# Patient Record
Sex: FEMALE | Race: WHITE | NOT HISPANIC OR LATINO | Employment: FULL TIME | URBAN - METROPOLITAN AREA
[De-identification: names, ages, dates, MRNs, and addresses within clinical notes are randomized per-mention and may not be internally consistent; named-entity substitution may affect disease eponyms.]

---

## 2017-06-22 ENCOUNTER — HISTORICAL (OUTPATIENT)
Dept: LAB | Facility: HOSPITAL | Age: 52
End: 2017-06-22

## 2017-09-28 ENCOUNTER — HISTORICAL (OUTPATIENT)
Dept: LAB | Facility: HOSPITAL | Age: 52
End: 2017-09-28

## 2017-10-12 ENCOUNTER — HISTORICAL (OUTPATIENT)
Dept: RADIOLOGY | Facility: HOSPITAL | Age: 52
End: 2017-10-12

## 2018-01-23 ENCOUNTER — HISTORICAL (OUTPATIENT)
Dept: LAB | Facility: HOSPITAL | Age: 53
End: 2018-01-23

## 2018-08-02 ENCOUNTER — HISTORICAL (OUTPATIENT)
Dept: LAB | Facility: HOSPITAL | Age: 53
End: 2018-08-02

## 2018-12-04 ENCOUNTER — HISTORICAL (OUTPATIENT)
Dept: LAB | Facility: HOSPITAL | Age: 53
End: 2018-12-04

## 2019-05-31 ENCOUNTER — HISTORICAL (OUTPATIENT)
Dept: RADIOLOGY | Facility: HOSPITAL | Age: 54
End: 2019-05-31

## 2019-06-03 ENCOUNTER — HISTORICAL (OUTPATIENT)
Dept: RADIOLOGY | Facility: HOSPITAL | Age: 54
End: 2019-06-03

## 2020-02-10 ENCOUNTER — HISTORICAL (OUTPATIENT)
Dept: LAB | Facility: HOSPITAL | Age: 55
End: 2020-02-10

## 2020-04-20 ENCOUNTER — HISTORICAL (OUTPATIENT)
Dept: RADIOLOGY | Facility: HOSPITAL | Age: 55
End: 2020-04-20

## 2020-04-27 ENCOUNTER — HISTORICAL (OUTPATIENT)
Dept: LAB | Facility: HOSPITAL | Age: 55
End: 2020-04-27

## 2020-08-11 ENCOUNTER — HISTORICAL (OUTPATIENT)
Dept: RESPIRATORY THERAPY | Facility: HOSPITAL | Age: 55
End: 2020-08-11

## 2021-01-25 ENCOUNTER — HISTORICAL (OUTPATIENT)
Dept: LAB | Facility: HOSPITAL | Age: 56
End: 2021-01-25

## 2021-06-30 ENCOUNTER — HISTORICAL (OUTPATIENT)
Dept: LAB | Facility: HOSPITAL | Age: 56
End: 2021-06-30

## 2021-07-02 ENCOUNTER — HISTORICAL (OUTPATIENT)
Dept: MEDSURG UNIT | Facility: HOSPITAL | Age: 56
End: 2021-07-02

## 2021-07-02 LAB — CRC RECOMMENDATION EXT: NORMAL

## 2021-12-02 ENCOUNTER — HISTORICAL (OUTPATIENT)
Dept: RADIOLOGY | Facility: HOSPITAL | Age: 56
End: 2021-12-02

## 2022-01-20 ENCOUNTER — HISTORICAL (OUTPATIENT)
Dept: LAB | Facility: HOSPITAL | Age: 57
End: 2022-01-20

## 2022-04-09 ENCOUNTER — HISTORICAL (OUTPATIENT)
Dept: ADMINISTRATIVE | Facility: HOSPITAL | Age: 57
End: 2022-04-09
Payer: MEDICAID

## 2022-04-26 VITALS
OXYGEN SATURATION: 98 % | DIASTOLIC BLOOD PRESSURE: 81 MMHG | HEIGHT: 64 IN | BODY MASS INDEX: 22.13 KG/M2 | SYSTOLIC BLOOD PRESSURE: 154 MMHG | WEIGHT: 129.63 LBS

## 2022-05-05 ENCOUNTER — LAB VISIT (OUTPATIENT)
Dept: LAB | Facility: HOSPITAL | Age: 57
End: 2022-05-05
Attending: FAMILY MEDICINE
Payer: MEDICAID

## 2022-05-05 DIAGNOSIS — E11.9 DIABETES MELLITUS WITHOUT COMPLICATION: Primary | ICD-10-CM

## 2022-05-05 LAB
EST. AVERAGE GLUCOSE BLD GHB EST-MCNC: 162.8 MG/DL
HBA1C MFR BLD: 7.3 %

## 2022-05-05 PROCEDURE — 36415 COLL VENOUS BLD VENIPUNCTURE: CPT

## 2022-05-05 PROCEDURE — 83036 HEMOGLOBIN GLYCOSYLATED A1C: CPT

## 2022-10-13 ENCOUNTER — OFFICE VISIT (OUTPATIENT)
Dept: FAMILY MEDICINE | Facility: CLINIC | Age: 57
End: 2022-10-13
Payer: MEDICAID

## 2022-10-13 VITALS
SYSTOLIC BLOOD PRESSURE: 132 MMHG | BODY MASS INDEX: 21.75 KG/M2 | DIASTOLIC BLOOD PRESSURE: 72 MMHG | RESPIRATION RATE: 20 BRPM | WEIGHT: 127.38 LBS | OXYGEN SATURATION: 100 % | TEMPERATURE: 98 F | HEART RATE: 68 BPM | HEIGHT: 64 IN

## 2022-10-13 DIAGNOSIS — Z76.89 ENCOUNTER TO ESTABLISH CARE: ICD-10-CM

## 2022-10-13 DIAGNOSIS — E78.5 HYPERLIPIDEMIA, UNSPECIFIED HYPERLIPIDEMIA TYPE: ICD-10-CM

## 2022-10-13 DIAGNOSIS — Z12.39 ENCOUNTER FOR SCREENING FOR MALIGNANT NEOPLASM OF BREAST, UNSPECIFIED SCREENING MODALITY: ICD-10-CM

## 2022-10-13 DIAGNOSIS — F41.9 ANXIETY AND DEPRESSION: Primary | ICD-10-CM

## 2022-10-13 DIAGNOSIS — R53.83 FATIGUE, UNSPECIFIED TYPE: ICD-10-CM

## 2022-10-13 DIAGNOSIS — L29.9 PRURITUS: ICD-10-CM

## 2022-10-13 DIAGNOSIS — F32.A ANXIETY AND DEPRESSION: Primary | ICD-10-CM

## 2022-10-13 DIAGNOSIS — E11.65 TYPE 2 DIABETES MELLITUS WITH HYPERGLYCEMIA, WITHOUT LONG-TERM CURRENT USE OF INSULIN: ICD-10-CM

## 2022-10-13 DIAGNOSIS — Z00.00 WELLNESS EXAMINATION: ICD-10-CM

## 2022-10-13 PROCEDURE — 3075F PR MOST RECENT SYSTOLIC BLOOD PRESS GE 130-139MM HG: ICD-10-PCS | Mod: CPTII,,, | Performed by: REGISTERED NURSE

## 2022-10-13 PROCEDURE — 1159F MED LIST DOCD IN RCRD: CPT | Mod: CPTII,,, | Performed by: REGISTERED NURSE

## 2022-10-13 PROCEDURE — 3078F PR MOST RECENT DIASTOLIC BLOOD PRESSURE < 80 MM HG: ICD-10-PCS | Mod: CPTII,,, | Performed by: REGISTERED NURSE

## 2022-10-13 PROCEDURE — 99204 OFFICE O/P NEW MOD 45 MIN: CPT | Mod: ,,, | Performed by: REGISTERED NURSE

## 2022-10-13 PROCEDURE — 1160F PR REVIEW ALL MEDS BY PRESCRIBER/CLIN PHARMACIST DOCUMENTED: ICD-10-PCS | Mod: CPTII,,, | Performed by: REGISTERED NURSE

## 2022-10-13 PROCEDURE — 4010F PR ACE/ARB THEARPY RXD/TAKEN: ICD-10-PCS | Mod: CPTII,,, | Performed by: REGISTERED NURSE

## 2022-10-13 PROCEDURE — 3075F SYST BP GE 130 - 139MM HG: CPT | Mod: CPTII,,, | Performed by: REGISTERED NURSE

## 2022-10-13 PROCEDURE — 1159F PR MEDICATION LIST DOCUMENTED IN MEDICAL RECORD: ICD-10-PCS | Mod: CPTII,,, | Performed by: REGISTERED NURSE

## 2022-10-13 PROCEDURE — 4010F ACE/ARB THERAPY RXD/TAKEN: CPT | Mod: CPTII,,, | Performed by: REGISTERED NURSE

## 2022-10-13 PROCEDURE — 1160F RVW MEDS BY RX/DR IN RCRD: CPT | Mod: CPTII,,, | Performed by: REGISTERED NURSE

## 2022-10-13 PROCEDURE — 99204 PR OFFICE/OUTPT VISIT, NEW, LEVL IV, 45-59 MIN: ICD-10-PCS | Mod: ,,, | Performed by: REGISTERED NURSE

## 2022-10-13 PROCEDURE — 3078F DIAST BP <80 MM HG: CPT | Mod: CPTII,,, | Performed by: REGISTERED NURSE

## 2022-10-13 RX ORDER — BUDESONIDE AND FORMOTEROL FUMARATE DIHYDRATE 160; 4.5 UG/1; UG/1
2 AEROSOL RESPIRATORY (INHALATION) 2 TIMES DAILY
COMMUNITY
Start: 2022-10-07 | End: 2023-01-23

## 2022-10-13 RX ORDER — LOSARTAN POTASSIUM 100 MG/1
100 TABLET ORAL DAILY
COMMUNITY
Start: 2022-09-26 | End: 2023-01-11

## 2022-10-13 RX ORDER — SAXAGLIPTIN 5 MG/1
TABLET, FILM COATED ORAL
COMMUNITY
End: 2023-01-04

## 2022-10-13 RX ORDER — SUCRALFATE 1 G/1
1 TABLET ORAL
COMMUNITY
End: 2024-02-27

## 2022-10-13 RX ORDER — HYDROCODONE BITARTRATE AND ACETAMINOPHEN 5; 325 MG/1; MG/1
TABLET ORAL
COMMUNITY
End: 2023-01-04

## 2022-10-13 RX ORDER — DULOXETIN HYDROCHLORIDE 60 MG/1
60 CAPSULE, DELAYED RELEASE ORAL DAILY
COMMUNITY
Start: 2022-10-10 | End: 2022-10-13

## 2022-10-13 RX ORDER — GLIMEPIRIDE 2 MG/1
2 TABLET ORAL 2 TIMES DAILY
COMMUNITY
Start: 2022-09-13 | End: 2022-10-24 | Stop reason: SDUPTHER

## 2022-10-13 RX ORDER — ASPIRIN 81 MG/1
81 TABLET ORAL DAILY
COMMUNITY
End: 2023-01-04

## 2022-10-13 RX ORDER — BUSPIRONE HYDROCHLORIDE 10 MG/1
10 TABLET ORAL 2 TIMES DAILY
COMMUNITY
Start: 2022-09-26 | End: 2022-10-13

## 2022-10-13 RX ORDER — AZELASTINE 1 MG/ML
SPRAY, METERED NASAL
COMMUNITY
Start: 2022-10-07

## 2022-10-13 RX ORDER — GABAPENTIN 600 MG/1
600 TABLET ORAL 4 TIMES DAILY
COMMUNITY
Start: 2022-10-07 | End: 2023-01-05

## 2022-10-13 RX ORDER — NIFEDIPINE 60 MG/1
60 TABLET, EXTENDED RELEASE ORAL DAILY
COMMUNITY
Start: 2022-09-26 | End: 2022-10-27

## 2022-10-13 RX ORDER — IBUPROFEN 800 MG/1
800 TABLET ORAL 3 TIMES DAILY PRN
COMMUNITY
Start: 2022-10-07 | End: 2023-01-23

## 2022-10-13 RX ORDER — OMEPRAZOLE 40 MG/1
40 CAPSULE, DELAYED RELEASE ORAL DAILY
COMMUNITY
Start: 2022-10-07 | End: 2023-02-22

## 2022-10-13 RX ORDER — HYDROXYZINE PAMOATE 25 MG/1
CAPSULE ORAL
Qty: 30 CAPSULE | Refills: 2 | Status: SHIPPED | OUTPATIENT
Start: 2022-10-13 | End: 2022-11-09

## 2022-10-13 RX ORDER — ALPRAZOLAM 0.5 MG/1
0.5 TABLET ORAL 2 TIMES DAILY
COMMUNITY
Start: 2022-10-07 | End: 2022-11-09

## 2022-10-13 RX ORDER — DULAGLUTIDE 1.5 MG/.5ML
INJECTION, SOLUTION SUBCUTANEOUS
COMMUNITY
Start: 2022-10-07 | End: 2023-02-23

## 2022-10-13 RX ORDER — ATORVASTATIN CALCIUM 10 MG/1
10 TABLET, FILM COATED ORAL DAILY
COMMUNITY
Start: 2022-09-26 | End: 2022-11-08

## 2022-10-13 RX ORDER — CARVEDILOL 25 MG/1
TABLET ORAL
COMMUNITY
End: 2023-01-04

## 2022-10-13 RX ORDER — HYDROXYZINE PAMOATE 25 MG/1
CAPSULE ORAL
COMMUNITY
End: 2022-10-13 | Stop reason: SDUPTHER

## 2022-10-13 RX ORDER — LINAGLIPTIN 5 MG/1
5 TABLET, FILM COATED ORAL DAILY
COMMUNITY
Start: 2022-09-26 | End: 2023-03-27

## 2022-10-13 RX ORDER — CITALOPRAM 20 MG/1
20 TABLET, FILM COATED ORAL DAILY
Qty: 30 TABLET | Refills: 1 | Status: SHIPPED | OUTPATIENT
Start: 2022-10-13 | End: 2022-12-19

## 2022-10-13 RX ORDER — FLUTICASONE PROPIONATE 50 MCG
1 SPRAY, SUSPENSION (ML) NASAL DAILY
COMMUNITY
Start: 2022-10-07 | End: 2023-01-05

## 2022-10-13 RX ORDER — ALBUTEROL SULFATE 90 UG/1
2 AEROSOL, METERED RESPIRATORY (INHALATION)
COMMUNITY
Start: 2022-09-06 | End: 2023-03-27

## 2022-10-13 RX ORDER — PANTOPRAZOLE SODIUM 40 MG/1
TABLET, DELAYED RELEASE ORAL
COMMUNITY
End: 2022-10-25

## 2022-10-13 RX ORDER — CLOPIDOGREL BISULFATE 75 MG/1
75 TABLET ORAL DAILY
COMMUNITY
Start: 2022-09-13 | End: 2024-02-27

## 2022-10-13 NOTE — PROGRESS NOTES
"Subjective:       Patient ID: Jocelyne Dickinson is a 57 y.o. female.    Chief Complaint: Establish Care and C/O anxiety unrelieved with medication "nerves"    Pt is a 56y/o female here today to [facility] and for wellness exam. Pt has PMH of Anxiety, Depression, HLD, GERD, COPD, DM2,  and carotid artery stenosis. Pt c/o increased fatigue, anxiety, and pruritis. Pt is currently on Xanax and Buspar, pt stated that she stopped the Buspar because it did not really help. Last Colonoscopy was in 2022 and pt stated that she was diagnosed with diverticulitis.   Review of Systems   Constitutional:  Positive for fatigue.   HENT:  Positive for postnasal drip.    Eyes: Negative.    Respiratory: Negative.     Cardiovascular: Negative.    Gastrointestinal: Negative.    Endocrine: Negative.    Genitourinary: Negative.    Musculoskeletal: Negative.    Integumentary:  Negative.   Allergic/Immunologic: Positive for environmental allergies.   Neurological: Negative.    Hematological:  Bruises/bleeds easily.        Pt on Plavix   Psychiatric/Behavioral:  Negative for suicidal ideas. The patient is nervous/anxious.        Objective:      Physical Exam  Constitutional:       Appearance: Normal appearance.   HENT:      Head: Normocephalic.      Right Ear: Tympanic membrane normal.      Left Ear: Tympanic membrane normal.      Nose: Nose normal.      Mouth/Throat:      Mouth: Mucous membranes are moist.   Eyes:      Pupils: Pupils are equal, round, and reactive to light.   Cardiovascular:      Rate and Rhythm: Normal rate and regular rhythm.      Pulses: Normal pulses.      Heart sounds: Normal heart sounds.   Pulmonary:      Effort: Pulmonary effort is normal.      Breath sounds: Normal breath sounds.   Abdominal:      General: Abdomen is flat.      Palpations: Abdomen is soft.   Musculoskeletal:         General: Normal range of motion.      Cervical back: Normal range of motion and neck supple.   Skin:     Capillary Refill: Capillary " refill takes less than 2 seconds.      Findings: Bruising present.   Neurological:      General: No focal deficit present.      Mental Status: She is alert and oriented to person, place, and time.   Psychiatric:         Mood and Affect: Mood normal.         Behavior: Behavior normal.         Thought Content: Thought content normal.         Judgment: Judgment normal.       Assessment:       Problem List Items Addressed This Visit          Psychiatric    Anxiety and depression - Primary    Relevant Medications    citalopram (CELEXA) 20 MG tablet       Derm    Pruritus    Relevant Medications    hydrOXYzine pamoate (VISTARIL) 25 MG Cap       Cardiac/Vascular    Hyperlipidemia    Relevant Orders    Lipid Panel       Endocrine    Type 2 diabetes mellitus with hyperglycemia, without long-term current use of insulin    Relevant Medications    TRULICITY 1.5 mg/0.5 mL pen injector    glimepiride (AMARYL) 2 MG tablet    TRADJENTA 5 mg Tab tablet    SAXagliptin (ONGLYZA) 5 mg Tab tablet    Other Relevant Orders    Microalbumin/Creatinine Ratio, Urine       Other    Fatigue    Relevant Orders    Iron and TIBC    Vitamin B12     Other Visit Diagnoses       Wellness examination        Relevant Orders    CBC Auto Differential    Comprehensive Metabolic Panel    Vitamin D    Hepatitis C Antibody    HIV 1/2 Ag/Ab (4th Gen)    Hemoglobin A1C    Encounter to establish care        Encounter for screening for malignant neoplasm of breast, unspecified screening modality        Relevant Orders    Mammo Digital Screening Bilat w/ Jeff        I spent a total of 50 minutes on the day of the visit.This includes face to face time and non-face to face time preparing to see the patient (eg, review of tests), obtaining and/or reviewing separately obtained history, documenting clinical information in the electronic or other health record, independently interpreting results and communicating results to the patient/family/caregiver, or care  coordinator.    Plan:   Wellness Exam: Healthy lifestyle discussed with patient, labs ordered, will call with results, tobacco cessation and harmful effects of vaping discussed with patient. Mammogram ordered today. Pt declined Cervical Cancer screening at this time.  Anxiety/Depression: Celexa 20mg sent to pharmacy on file, take as directed. Instructed patient to discontinue medication immediately if SI/HI presents and report to ED, pt verbalized understanding. Continue Xanax as previously prescribed.  Pruritus-take Vistaril 25 mg as previously prescribed, keep fingernails short  Hyperlipidemia-low-fat low-cholesterol diet discussed with patient, continue Lipitor as previously prescribed.  Lipid panel ordered, will call patient with results.  Type 2 diabetes-hemoglobin A1c ordered, will call patient with results, ADA diet discussed with patient.  Continue current medication regimen as previously prescribed.  Microalbumin creatinine ratio ordered, will call patient with results.  Fatigue-iron profile and vitamin B12 ordered, will call patient with results.  Healthy sleeping habits discussed with patient.  Return to clinic in 1 month or as needed.

## 2022-10-17 ENCOUNTER — PATIENT MESSAGE (OUTPATIENT)
Dept: FAMILY MEDICINE | Facility: CLINIC | Age: 57
End: 2022-10-17
Payer: MEDICAID

## 2022-10-17 ENCOUNTER — LAB VISIT (OUTPATIENT)
Dept: LAB | Facility: HOSPITAL | Age: 57
End: 2022-10-17
Attending: REGISTERED NURSE
Payer: MEDICAID

## 2022-10-17 DIAGNOSIS — Z00.00 WELLNESS EXAMINATION: ICD-10-CM

## 2022-10-17 DIAGNOSIS — E78.5 HYPERLIPIDEMIA, UNSPECIFIED HYPERLIPIDEMIA TYPE: ICD-10-CM

## 2022-10-17 DIAGNOSIS — R53.83 FATIGUE, UNSPECIFIED TYPE: ICD-10-CM

## 2022-10-17 LAB
ALBUMIN SERPL-MCNC: 4 GM/DL (ref 3.5–5)
ALBUMIN/GLOB SERPL: 1.3 RATIO (ref 1.1–2)
ALP SERPL-CCNC: 81 UNIT/L (ref 40–150)
ALT SERPL-CCNC: 13 UNIT/L (ref 0–55)
AST SERPL-CCNC: 16 UNIT/L (ref 5–34)
BASOPHILS # BLD AUTO: 0.03 X10(3)/MCL (ref 0–0.2)
BASOPHILS NFR BLD AUTO: 0.5 %
BILIRUBIN DIRECT+TOT PNL SERPL-MCNC: 0.4 MG/DL
BUN SERPL-MCNC: 9 MG/DL (ref 9.8–20.1)
CALCIUM SERPL-MCNC: 9.6 MG/DL (ref 8.4–10.2)
CHLORIDE SERPL-SCNC: 108 MMOL/L (ref 98–107)
CHOLEST SERPL-MCNC: 164 MG/DL
CHOLEST/HDLC SERPL: 3 {RATIO} (ref 0–5)
CO2 SERPL-SCNC: 29 MMOL/L (ref 22–29)
CREAT SERPL-MCNC: 0.82 MG/DL (ref 0.55–1.02)
CREAT UR-MCNC: 115.2 MG/DL (ref 47–110)
DEPRECATED CALCIDIOL+CALCIFEROL SERPL-MC: 29.3 NG/ML (ref 30–80)
EOSINOPHIL # BLD AUTO: 0.07 X10(3)/MCL (ref 0–0.9)
EOSINOPHIL NFR BLD AUTO: 1.2 %
ERYTHROCYTE [DISTWIDTH] IN BLOOD BY AUTOMATED COUNT: 13.5 % (ref 11.5–17)
EST. AVERAGE GLUCOSE BLD GHB EST-MCNC: 134.1 MG/DL
GFR SERPLBLD CREATININE-BSD FMLA CKD-EPI: >60 MLS/MIN/1.73/M2
GLOBULIN SER-MCNC: 3 GM/DL (ref 2.4–3.5)
GLUCOSE SERPL-MCNC: 100 MG/DL (ref 74–100)
HBA1C MFR BLD: 6.3 %
HCT VFR BLD AUTO: 37.4 % (ref 37–47)
HCV AB SERPL QL IA: NONREACTIVE
HDLC SERPL-MCNC: 51 MG/DL (ref 35–60)
HGB BLD-MCNC: 12.3 GM/DL (ref 12–16)
HIV 1+2 AB+HIV1 P24 AG SERPL QL IA: NONREACTIVE
IMM GRANULOCYTES # BLD AUTO: 0.01 X10(3)/MCL (ref 0–0.04)
IMM GRANULOCYTES NFR BLD AUTO: 0.2 %
IRON SATN MFR SERPL: 26 % (ref 20–50)
IRON SERPL-MCNC: 81 UG/DL (ref 50–170)
LDLC SERPL CALC-MCNC: 77 MG/DL (ref 50–140)
LYMPHOCYTES # BLD AUTO: 1.67 X10(3)/MCL (ref 0.6–4.6)
LYMPHOCYTES NFR BLD AUTO: 29.3 %
MCH RBC QN AUTO: 29.3 PG (ref 27–31)
MCHC RBC AUTO-ENTMCNC: 32.9 MG/DL (ref 33–36)
MCV RBC AUTO: 89 FL (ref 80–94)
MICROALBUMIN UR-MCNC: 6.1 UG/ML
MICROALBUMIN/CREAT RATIO PNL UR: 5.3 MG/GM CR (ref 0–30)
MONOCYTES # BLD AUTO: 0.51 X10(3)/MCL (ref 0.1–1.3)
MONOCYTES NFR BLD AUTO: 9 %
NEUTROPHILS # BLD AUTO: 3.4 X10(3)/MCL (ref 2.1–9.2)
NEUTROPHILS NFR BLD AUTO: 59.8 %
NRBC BLD AUTO-RTO: 0 %
PLATELET # BLD AUTO: 298 X10(3)/MCL (ref 130–400)
PMV BLD AUTO: 10.5 FL (ref 7.4–10.4)
POTASSIUM SERPL-SCNC: 4.3 MMOL/L (ref 3.5–5.1)
PROT SERPL-MCNC: 7 GM/DL (ref 6.4–8.3)
RBC # BLD AUTO: 4.2 X10(6)/MCL (ref 4.2–5.4)
SODIUM SERPL-SCNC: 145 MMOL/L (ref 136–145)
TIBC SERPL-MCNC: 225 UG/DL (ref 70–310)
TIBC SERPL-MCNC: 306 UG/DL (ref 250–450)
TRIGL SERPL-MCNC: 179 MG/DL (ref 37–140)
VIT B12 SERPL-MCNC: 434 PG/ML (ref 213–816)
VLDLC SERPL CALC-MCNC: 36 MG/DL
WBC # SPEC AUTO: 5.7 X10(3)/MCL (ref 4.5–11.5)

## 2022-10-17 PROCEDURE — 80053 COMPREHEN METABOLIC PANEL: CPT

## 2022-10-17 PROCEDURE — 36415 COLL VENOUS BLD VENIPUNCTURE: CPT

## 2022-10-17 PROCEDURE — 80061 LIPID PANEL: CPT

## 2022-10-17 PROCEDURE — 83036 HEMOGLOBIN GLYCOSYLATED A1C: CPT

## 2022-10-17 PROCEDURE — 85025 COMPLETE CBC W/AUTO DIFF WBC: CPT

## 2022-10-17 PROCEDURE — 83540 ASSAY OF IRON: CPT

## 2022-10-17 PROCEDURE — 87389 HIV-1 AG W/HIV-1&-2 AB AG IA: CPT

## 2022-10-17 PROCEDURE — 82607 VITAMIN B-12: CPT

## 2022-10-17 PROCEDURE — 86803 HEPATITIS C AB TEST: CPT

## 2022-10-17 PROCEDURE — 82306 VITAMIN D 25 HYDROXY: CPT

## 2022-10-19 ENCOUNTER — HOSPITAL ENCOUNTER (OUTPATIENT)
Dept: RADIOLOGY | Facility: HOSPITAL | Age: 57
Discharge: HOME | End: 2022-10-19
Attending: REGISTERED NURSE
Payer: MEDICAID

## 2022-10-19 DIAGNOSIS — Z12.39 ENCOUNTER FOR SCREENING FOR MALIGNANT NEOPLASM OF BREAST, UNSPECIFIED SCREENING MODALITY: ICD-10-CM

## 2022-10-19 PROCEDURE — 77063 BREAST TOMOSYNTHESIS BI: CPT | Mod: 26,,, | Performed by: RADIOLOGY

## 2022-10-19 PROCEDURE — 77067 SCR MAMMO BI INCL CAD: CPT | Mod: TC

## 2022-10-19 PROCEDURE — 77067 SCR MAMMO BI INCL CAD: CPT | Mod: 26,,, | Performed by: RADIOLOGY

## 2022-10-19 PROCEDURE — 77063 MAMMO DIGITAL SCREENING BILAT WITH TOMO: ICD-10-PCS | Mod: 26,,, | Performed by: RADIOLOGY

## 2022-10-19 PROCEDURE — 77067 MAMMO DIGITAL SCREENING BILAT WITH TOMO: ICD-10-PCS | Mod: 26,,, | Performed by: RADIOLOGY

## 2022-10-24 DIAGNOSIS — E11.65 TYPE 2 DIABETES MELLITUS WITH HYPERGLYCEMIA, WITHOUT LONG-TERM CURRENT USE OF INSULIN: ICD-10-CM

## 2022-10-24 DIAGNOSIS — E11.65 TYPE 2 DIABETES MELLITUS WITH HYPERGLYCEMIA, WITHOUT LONG-TERM CURRENT USE OF INSULIN: Primary | ICD-10-CM

## 2022-10-24 RX ORDER — GLIMEPIRIDE 2 MG/1
2 TABLET ORAL 2 TIMES DAILY
Qty: 30 TABLET | Refills: 3 | Status: SHIPPED | OUTPATIENT
Start: 2022-10-24 | End: 2022-10-24 | Stop reason: SDUPTHER

## 2022-10-24 RX ORDER — GLIMEPIRIDE 2 MG/1
2 TABLET ORAL 2 TIMES DAILY
Qty: 60 TABLET | Refills: 3 | Status: SHIPPED | OUTPATIENT
Start: 2022-10-24 | End: 2022-10-25

## 2022-10-24 NOTE — TELEPHONE ENCOUNTER
----- Message from LEANN Rodriguez sent at 10/24/2022  8:30 AM CDT -----  Regarding: Results  Please inform patient that her mammogram was normal, we will re screen in one year. Thanks.

## 2022-10-25 RX ORDER — GLIMEPIRIDE 2 MG/1
2 TABLET ORAL 2 TIMES DAILY
Qty: 180 TABLET | Refills: 3 | Status: SHIPPED | OUTPATIENT
Start: 2022-10-25 | End: 2023-09-05

## 2022-11-08 ENCOUNTER — OFFICE VISIT (OUTPATIENT)
Dept: FAMILY MEDICINE | Facility: CLINIC | Age: 57
End: 2022-11-08
Payer: MEDICAID

## 2022-11-08 VITALS
HEIGHT: 64 IN | TEMPERATURE: 97 F | OXYGEN SATURATION: 99 % | HEART RATE: 88 BPM | BODY MASS INDEX: 22.5 KG/M2 | SYSTOLIC BLOOD PRESSURE: 136 MMHG | DIASTOLIC BLOOD PRESSURE: 88 MMHG | WEIGHT: 131.81 LBS | RESPIRATION RATE: 20 BRPM

## 2022-11-08 DIAGNOSIS — F32.A ANXIETY AND DEPRESSION: Primary | ICD-10-CM

## 2022-11-08 DIAGNOSIS — H65.04 RECURRENT ACUTE SEROUS OTITIS MEDIA OF RIGHT EAR: ICD-10-CM

## 2022-11-08 DIAGNOSIS — F41.9 ANXIETY AND DEPRESSION: Primary | ICD-10-CM

## 2022-11-08 DIAGNOSIS — E78.5 HYPERLIPIDEMIA, UNSPECIFIED HYPERLIPIDEMIA TYPE: ICD-10-CM

## 2022-11-08 PROCEDURE — 3075F SYST BP GE 130 - 139MM HG: CPT | Mod: CPTII,,, | Performed by: REGISTERED NURSE

## 2022-11-08 PROCEDURE — 3079F PR MOST RECENT DIASTOLIC BLOOD PRESSURE 80-89 MM HG: ICD-10-PCS | Mod: CPTII,,, | Performed by: REGISTERED NURSE

## 2022-11-08 PROCEDURE — 1159F MED LIST DOCD IN RCRD: CPT | Mod: CPTII,,, | Performed by: REGISTERED NURSE

## 2022-11-08 PROCEDURE — 4010F ACE/ARB THERAPY RXD/TAKEN: CPT | Mod: CPTII,,, | Performed by: REGISTERED NURSE

## 2022-11-08 PROCEDURE — 3008F BODY MASS INDEX DOCD: CPT | Mod: CPTII,,, | Performed by: REGISTERED NURSE

## 2022-11-08 PROCEDURE — 3075F PR MOST RECENT SYSTOLIC BLOOD PRESS GE 130-139MM HG: ICD-10-PCS | Mod: CPTII,,, | Performed by: REGISTERED NURSE

## 2022-11-08 PROCEDURE — 99213 PR OFFICE/OUTPT VISIT, EST, LEVL III, 20-29 MIN: ICD-10-PCS | Mod: ,,, | Performed by: REGISTERED NURSE

## 2022-11-08 PROCEDURE — 1159F PR MEDICATION LIST DOCUMENTED IN MEDICAL RECORD: ICD-10-PCS | Mod: CPTII,,, | Performed by: REGISTERED NURSE

## 2022-11-08 PROCEDURE — 4010F PR ACE/ARB THEARPY RXD/TAKEN: ICD-10-PCS | Mod: CPTII,,, | Performed by: REGISTERED NURSE

## 2022-11-08 PROCEDURE — 99213 OFFICE O/P EST LOW 20 MIN: CPT | Mod: ,,, | Performed by: REGISTERED NURSE

## 2022-11-08 PROCEDURE — 3079F DIAST BP 80-89 MM HG: CPT | Mod: CPTII,,, | Performed by: REGISTERED NURSE

## 2022-11-08 PROCEDURE — 3008F PR BODY MASS INDEX (BMI) DOCUMENTED: ICD-10-PCS | Mod: CPTII,,, | Performed by: REGISTERED NURSE

## 2022-11-08 RX ORDER — ATORVASTATIN CALCIUM 20 MG/1
20 TABLET, FILM COATED ORAL DAILY
Qty: 90 TABLET | Refills: 3 | Status: SHIPPED | OUTPATIENT
Start: 2022-11-08 | End: 2023-09-05

## 2022-11-08 RX ORDER — AMOXICILLIN 500 MG/1
500 TABLET, FILM COATED ORAL EVERY 12 HOURS
Qty: 14 TABLET | Refills: 0 | Status: SHIPPED | OUTPATIENT
Start: 2022-11-08 | End: 2022-11-15

## 2022-11-10 ENCOUNTER — CLINICAL SUPPORT (OUTPATIENT)
Dept: FAMILY MEDICINE | Facility: CLINIC | Age: 57
End: 2022-11-10
Payer: MEDICAID

## 2022-11-10 DIAGNOSIS — J30.89 PERENNIAL ALLERGIC RHINITIS: Primary | ICD-10-CM

## 2022-11-10 PROCEDURE — 95115 PR IMMUNOTHERAPY, ONE INJECTION: ICD-10-PCS | Mod: ,,, | Performed by: FAMILY MEDICINE

## 2022-11-10 PROCEDURE — 95115 IMMUNOTHERAPY ONE INJECTION: CPT | Mod: ,,, | Performed by: FAMILY MEDICINE

## 2022-11-10 NOTE — PROGRESS NOTES
Pt presents today in clinic for a nurse visit to receive allergy shots. Admin 0.15mL of vial #2 into back of LUE. Pt tolerated injections well and left office in good condition after being monitored for 20 minutes in office, no reaction noted. Pt scheduled in one week for next round of allergy injections.   Pt brought own allergy mix from allergist.  GA BUITRAGO LPN

## 2022-11-22 ENCOUNTER — CLINICAL SUPPORT (OUTPATIENT)
Dept: FAMILY MEDICINE | Facility: CLINIC | Age: 57
End: 2022-11-22
Payer: MEDICAID

## 2022-11-22 DIAGNOSIS — J30.89 PERENNIAL ALLERGIC RHINITIS: Primary | ICD-10-CM

## 2022-11-22 DIAGNOSIS — J06.9 UPPER RESPIRATORY TRACT INFECTION, UNSPECIFIED TYPE: Primary | ICD-10-CM

## 2022-11-22 PROCEDURE — 95115 IMMUNOTHERAPY ONE INJECTION: CPT | Mod: ,,, | Performed by: FAMILY MEDICINE

## 2022-11-22 PROCEDURE — 95115 PR IMMUNOTHERAPY, ONE INJECTION: ICD-10-PCS | Mod: ,,, | Performed by: FAMILY MEDICINE

## 2022-11-22 RX ORDER — AZITHROMYCIN 1 G/1
1 POWDER, FOR SUSPENSION ORAL ONCE
Qty: 1 PACKET | Refills: 0 | Status: CANCELLED | OUTPATIENT
Start: 2022-11-22 | End: 2022-11-22

## 2022-11-22 RX ORDER — AZITHROMYCIN 250 MG/1
TABLET, FILM COATED ORAL
Qty: 6 TABLET | Refills: 0 | Status: SHIPPED | OUTPATIENT
Start: 2022-11-22 | End: 2022-11-27

## 2022-11-22 NOTE — PROCEDURES
Pt presents today in clinic for a nurse visit to receive allergy shots. Admin 0.20mL of Vial A into back of RUE. Pt tolerated injections well and left office in good condition after being monitored for 20 minutes in office, no reaction noted. Pt scheduled in one week for next round of allergy injections.   Pt brought own allergy mix from allergist.  CIARA WALLS

## 2022-12-01 ENCOUNTER — CLINICAL SUPPORT (OUTPATIENT)
Dept: FAMILY MEDICINE | Facility: CLINIC | Age: 57
End: 2022-12-01
Payer: MEDICAID

## 2022-12-01 DIAGNOSIS — J06.9 UPPER RESPIRATORY TRACT INFECTION, UNSPECIFIED TYPE: ICD-10-CM

## 2022-12-01 DIAGNOSIS — J30.89 PERENNIAL ALLERGIC RHINITIS: Primary | ICD-10-CM

## 2022-12-01 PROCEDURE — 95115 PR IMMUNOTHERAPY, ONE INJECTION: ICD-10-PCS | Mod: ,,, | Performed by: FAMILY MEDICINE

## 2022-12-01 PROCEDURE — 95115 IMMUNOTHERAPY ONE INJECTION: CPT | Mod: ,,, | Performed by: FAMILY MEDICINE

## 2022-12-01 NOTE — PROGRESS NOTES
Pt presents today in clinic for a nurse visit to receive allergy shots. Admin 0.25mL of Vial A into back of LUE  Pt tolerated injections well and left office in good condition after being monitored for 20 minutes in office, no reaction noted. Pt scheduled in one week for next round of allergy injections.   Pt brought own allergy mix from allergist.  Jose Antonio URBINA

## 2022-12-08 ENCOUNTER — CLINICAL SUPPORT (OUTPATIENT)
Dept: FAMILY MEDICINE | Facility: CLINIC | Age: 57
End: 2022-12-08
Payer: MEDICAID

## 2022-12-08 DIAGNOSIS — J30.89 PERENNIAL ALLERGIC RHINITIS: Primary | ICD-10-CM

## 2022-12-08 PROCEDURE — 95115 IMMUNOTHERAPY ONE INJECTION: CPT | Mod: ,,, | Performed by: FAMILY MEDICINE

## 2022-12-08 PROCEDURE — 95115 PR IMMUNOTHERAPY, ONE INJECTION: ICD-10-PCS | Mod: ,,, | Performed by: FAMILY MEDICINE

## 2022-12-08 NOTE — PROGRESS NOTES
Pt presents today in clinic for a nurse visit to receive allergy shots. Admin 0.30mL of Vial A into back of RUE. Pt tolerated injections well and left office in good condition after being monitored for 20 minutes in office, no reaction noted. Pt scheduled in one week for next round of allergy injections.   Pt brought own allergy mix from allergist.  CIARA WALLS

## 2022-12-15 ENCOUNTER — CLINICAL SUPPORT (OUTPATIENT)
Dept: FAMILY MEDICINE | Facility: CLINIC | Age: 57
End: 2022-12-15
Payer: MEDICAID

## 2022-12-15 DIAGNOSIS — J30.89 PERENNIAL ALLERGIC RHINITIS: Primary | ICD-10-CM

## 2022-12-15 PROCEDURE — 95115 IMMUNOTHERAPY ONE INJECTION: CPT | Mod: ,,, | Performed by: FAMILY MEDICINE

## 2022-12-15 PROCEDURE — 95115 PR IMMUNOTHERAPY, ONE INJECTION: ICD-10-PCS | Mod: ,,, | Performed by: FAMILY MEDICINE

## 2022-12-15 NOTE — PROGRESS NOTES
Pt presents today in clinic for a nurse visit to receive allergy shots. Admin 0.35mL of Vial A into back of LUE. Pt tolerated injections well and left office in good condition after being monitored for 20 minutes in office, no reaction noted. Pt scheduled in one week for next round of allergy injections.   Pt brought own allergy mix from allergist.  CIARA WALLS

## 2022-12-22 ENCOUNTER — CLINICAL SUPPORT (OUTPATIENT)
Dept: FAMILY MEDICINE | Facility: CLINIC | Age: 57
End: 2022-12-22
Payer: MEDICAID

## 2022-12-22 DIAGNOSIS — J30.89 PERENNIAL ALLERGIC RHINITIS: Primary | ICD-10-CM

## 2022-12-22 PROCEDURE — 95115 IMMUNOTHERAPY ONE INJECTION: CPT | Mod: ,,, | Performed by: FAMILY MEDICINE

## 2022-12-22 PROCEDURE — 95115 PR IMMUNOTHERAPY, ONE INJECTION: ICD-10-PCS | Mod: ,,, | Performed by: FAMILY MEDICINE

## 2022-12-22 NOTE — PROGRESS NOTES
Pt presents today in clinic for a nurse visit to receive allergy shots. Admin 0.40mL of Red Vial into back of RUE. Pt tolerated injections well and left office in good condition after being monitored for 20 minutes in office, no reaction noted. Pt scheduled in one week for next round of allergy injections.   Pt brought own allergy mix from allergist.  CIARA WALLS

## 2022-12-29 ENCOUNTER — CLINICAL SUPPORT (OUTPATIENT)
Dept: FAMILY MEDICINE | Facility: CLINIC | Age: 57
End: 2022-12-29
Payer: MEDICAID

## 2022-12-29 DIAGNOSIS — J30.89 PERENNIAL ALLERGIC RHINITIS: Primary | ICD-10-CM

## 2022-12-29 PROCEDURE — 95115 PR IMMUNOTHERAPY, ONE INJECTION: ICD-10-PCS | Mod: ,,, | Performed by: FAMILY MEDICINE

## 2022-12-29 PROCEDURE — 95115 IMMUNOTHERAPY ONE INJECTION: CPT | Mod: ,,, | Performed by: FAMILY MEDICINE

## 2022-12-29 NOTE — PROGRESS NOTES
Pt presents today in clinic for a nurse visit to receive allergy shots. Admin 0.45mL of Vial A into back of LUE. Pt tolerated injections well and left office in good condition after being monitored for 20 minutes in office, no reaction noted. Pt scheduled in one week for next round of allergy injections.   Pt brought own allergy mix from allergist.  CIARA WALLS

## 2023-01-04 ENCOUNTER — OFFICE VISIT (OUTPATIENT)
Dept: FAMILY MEDICINE | Facility: CLINIC | Age: 58
End: 2023-01-04
Payer: MEDICAID

## 2023-01-04 VITALS — HEIGHT: 64 IN | RESPIRATION RATE: 20 BRPM | BODY MASS INDEX: 22.36 KG/M2 | WEIGHT: 131 LBS | TEMPERATURE: 98 F

## 2023-01-04 DIAGNOSIS — R09.81 SINUS CONGESTION: Primary | ICD-10-CM

## 2023-01-04 PROBLEM — I10 ESSENTIAL HYPERTENSION: Status: ACTIVE | Noted: 2018-12-07

## 2023-01-04 PROBLEM — G62.9 NEUROPATHY: Status: ACTIVE | Noted: 2018-12-07

## 2023-01-04 PROBLEM — I65.22 LEFT CAROTID ARTERY STENOSIS: Status: ACTIVE | Noted: 2021-04-20

## 2023-01-04 PROBLEM — F41.9 ANXIETY: Status: ACTIVE | Noted: 2018-12-07

## 2023-01-04 PROCEDURE — 1159F PR MEDICATION LIST DOCUMENTED IN MEDICAL RECORD: ICD-10-PCS | Mod: CPTII,95,, | Performed by: FAMILY MEDICINE

## 2023-01-04 PROCEDURE — 1159F MED LIST DOCD IN RCRD: CPT | Mod: CPTII,95,, | Performed by: FAMILY MEDICINE

## 2023-01-04 PROCEDURE — 99213 PR OFFICE/OUTPT VISIT, EST, LEVL III, 20-29 MIN: ICD-10-PCS | Mod: 95,,, | Performed by: FAMILY MEDICINE

## 2023-01-04 PROCEDURE — 99213 OFFICE O/P EST LOW 20 MIN: CPT | Mod: 95,,, | Performed by: FAMILY MEDICINE

## 2023-01-04 PROCEDURE — 3008F BODY MASS INDEX DOCD: CPT | Mod: CPTII,95,, | Performed by: FAMILY MEDICINE

## 2023-01-04 PROCEDURE — 3008F PR BODY MASS INDEX (BMI) DOCUMENTED: ICD-10-PCS | Mod: CPTII,95,, | Performed by: FAMILY MEDICINE

## 2023-01-04 PROCEDURE — 1160F RVW MEDS BY RX/DR IN RCRD: CPT | Mod: CPTII,95,, | Performed by: FAMILY MEDICINE

## 2023-01-04 PROCEDURE — 1160F PR REVIEW ALL MEDS BY PRESCRIBER/CLIN PHARMACIST DOCUMENTED: ICD-10-PCS | Mod: CPTII,95,, | Performed by: FAMILY MEDICINE

## 2023-01-04 NOTE — PROGRESS NOTES
Patient ID: [id number]     Chief Complaint: Cough, Nasal Congestion, and Sore Throat (Burning when swallowing)      HPI:     This is a telemedicine note. Patient was treated using telemedicine, real time audio and video, according to [facility] protocols. IEdi DO, conducted the visit from the [facility]. The patient participated in the visit at a non-[facility] location selected by the patient, identified below. I am licensed in the state where the patient stated they are located. The patient stated that they understood and accepted the privacy and security risks to their information at their location. This visit is not recorded.    Patient was located at the patient's home.       Jocelyne Dickinson is a 58 y.o. female here today for a telemedicine visit. Cough, congestion, sore throat started Monday morning 23.         ----------------------------  Anxiety disorder, unspecified  Carotid artery stenosis  Cellulitis of scalp  COPD (chronic obstructive pulmonary disease)  Diabetes mellitus  HTN (hypertension)  Mixed hyperlipidemia  Neuropathy  Osteoarthritis  Pneumonia, unspecified organism  Sleep apnea, unspecified  Syncope     Past Surgical History:   Procedure Laterality Date    CATARACT EXTRACTION      REPAIR OF CAROTID ARTERY      TONSILLECTOMY         Review of patient's allergies indicates:  No Known Allergies    Outpatient Medications Marked as Taking for the 23 encounter (Office Visit) with Edi Roldan DO   Medication Sig Dispense Refill    albuterol (PROVENTIL/VENTOLIN HFA) 90 mcg/actuation inhaler Inhale 2 puffs into the lungs every 4 to 6 hours as needed for Shortness of Breath.      ALPRAZolam (XANAX) 0.5 MG tablet TAKE ONE TABLET BY MOUTH TWICE DAILY 60 tablet 4    atorvastatin (LIPITOR) 20 MG tablet Take 1 tablet (20 mg total) by mouth once daily. 90 tablet 3    azelastine (ASTELIN) 137 mcg (0.1 %) nasal spray SMARTSI-2 Spray(s) Both Nares Twice Daily      citalopram  (CELEXA) 20 MG tablet TAKE ONE TABLET BY MOUTH DAILY 30 tablet 1    clopidogreL (PLAVIX) 75 mg tablet Take 75 mg by mouth once daily.      fluticasone propionate (FLONASE) 50 mcg/actuation nasal spray 1 spray by Each Nostril route once daily.      gabapentin (NEURONTIN) 600 MG tablet Take 600 mg by mouth 4 (four) times daily.      glimepiride (AMARYL) 2 MG tablet Take 1 tablet (2 mg total) by mouth 2 (two) times a day. 180 tablet 3    hydrOXYzine pamoate (VISTARIL) 25 MG Cap TAKE ONE CAPSULE BY MOUTH DAILY 30 capsule 1     mg tablet Take 800 mg by mouth 3 (three) times daily as needed.      losartan (COZAAR) 100 MG tablet Take 100 mg by mouth once daily.      NIFEdipine (PROCARDIA-XL) 60 MG (OSM) 24 hr tablet TAKE 1 TABLET BY MOUTH DAILY FOR BLOOD PRESSURE 30 tablet 4    omeprazole (PRILOSEC) 40 MG capsule Take 40 mg by mouth once daily.      sucralfate (CARAFATE) 1 gram tablet sucralfate 1 gram tablet      SYMBICORT 160-4.5 mcg/actuation HFAA Inhale 2 puffs into the lungs 2 (two) times daily.      TRADJENTA 5 mg Tab tablet Take 5 mg by mouth once daily.      TRULICITY 1.5 mg/0.5 mL pen injector Inject into the skin.      [DISCONTINUED] SAXagliptin (ONGLYZA) 5 mg Tab tablet Onglyza 5 mg tablet         Social History     Socioeconomic History    Marital status: Single   Tobacco Use    Smoking status: Every Day     Types: Vaping with nicotine    Smokeless tobacco: Never   Substance and Sexual Activity    Alcohol use: Not Currently    Drug use: Never        Family History   Problem Relation Age of Onset    Hypertension Father     Heart disease Father     Cancer Father     Cancer Maternal Grandmother     Vision loss Maternal Grandmother         Patient Care Team:  Chapincito Koenig MD as PCP - General (Family Medicine)      Subjective:       Review of Systems   Constitutional:  Negative for fever.   HENT:  Positive for congestion and sore throat. Negative for ear discharge and ear pain.    Respiratory:  Positive  "for cough and stridor. Negative for shortness of breath.    Gastrointestinal:  Negative for abdominal pain, diarrhea and vomiting.   Musculoskeletal:  Negative for neck pain.   Neurological:  Negative for headaches.     See HPI for details    All Other ROS: Negative except as stated in HPI.       Objective:     Temp 97.8 °F (36.6 °C)   Resp 20   Ht 5' 4" (1.626 m)   Wt 59.4 kg (131 lb)   BMI 22.49 kg/m²     Physical Exam    Physical Exam: LIMITED DUE TO TELEMEDICINE RESTRICTIONS.  General: Alert and oriented, No acute distress.  Head: Normocephalic, Atraumatic.  Eye: Sclera non-icteric.  Neck/Thyroid:  Full range of motion.  Respiratory: Non-labored respirations, Symmetrical chest wall expansion.  Musculoskeletal: Normal range of motion.  Integumentary:  No visible suspicious lesions or rashes. No diaphoresis.   Neurologic: No focal deficits  Psychiatric: Normal interaction, Coherent speech, Euthymic mood, Appropriate affect       Assessment:       ICD-10-CM ICD-9-CM   1. Sinus congestion  R09.81 [id number]        Plan:     1. Sinus congestion  -Recommended OTC mucinex. At this time will defer from prescribing a steroid due to patient's diagnosis of diabetes and the risk of increasing her blood sugar levels. If symptoms not improved in the next week will consider prescribing short course of steroids. Patient voiced understanding of plan.       Medication List with Changes/Refills   Current Medications    ALBUTEROL (PROVENTIL/VENTOLIN HFA) 90 MCG/ACTUATION INHALER    Inhale 2 puffs into the lungs every 4 to 6 hours as needed for Shortness of Breath.       Start Date: 2022  End Date: --    ALPRAZOLAM (XANAX) 0.5 MG TABLET    TAKE ONE TABLET BY MOUTH TWICE DAILY       Start Date: 2022 End Date: --    ATORVASTATIN (LIPITOR) 20 MG TABLET    Take 1 tablet (20 mg total) by mouth once daily.       Start Date: 2022 End Date: 2023    AZELASTINE (ASTELIN) 137 MCG (0.1 %) NASAL SPRAY    SMARTSI-2 Spray(s) " Both Nares Twice Daily       Start Date: 10/7/2022 End Date: --    CITALOPRAM (CELEXA) 20 MG TABLET    TAKE ONE TABLET BY MOUTH DAILY       Start Date: 12/19/2022End Date: --    CLOPIDOGREL (PLAVIX) 75 MG TABLET    Take 75 mg by mouth once daily.       Start Date: 9/13/2022 End Date: --    FLUTICASONE PROPIONATE (FLONASE) 50 MCG/ACTUATION NASAL SPRAY    1 spray by Each Nostril route once daily.       Start Date: 10/7/2022 End Date: --    GABAPENTIN (NEURONTIN) 600 MG TABLET    Take 600 mg by mouth 4 (four) times daily.       Start Date: 10/7/2022 End Date: --    GLIMEPIRIDE (AMARYL) 2 MG TABLET    Take 1 tablet (2 mg total) by mouth 2 (two) times a day.       Start Date: 10/25/2022End Date: 10/25/2023    HYDROXYZINE PAMOATE (VISTARIL) 25 MG CAP    TAKE ONE CAPSULE BY MOUTH DAILY       Start Date: 11/9/2022 End Date: --     MG TABLET    Take 800 mg by mouth 3 (three) times daily as needed.       Start Date: 10/7/2022 End Date: --    LOSARTAN (COZAAR) 100 MG TABLET    Take 100 mg by mouth once daily.       Start Date: 9/26/2022 End Date: --    NIFEDIPINE (PROCARDIA-XL) 60 MG (OSM) 24 HR TABLET    TAKE 1 TABLET BY MOUTH DAILY FOR BLOOD PRESSURE       Start Date: 10/27/2022End Date: --    OMEPRAZOLE (PRILOSEC) 40 MG CAPSULE    Take 40 mg by mouth once daily.       Start Date: 10/7/2022 End Date: --    SUCRALFATE (CARAFATE) 1 GRAM TABLET    sucralfate 1 gram tablet       Start Date: --        End Date: --    SYMBICORT 160-4.5 MCG/ACTUATION HFAA    Inhale 2 puffs into the lungs 2 (two) times daily.       Start Date: 10/7/2022 End Date: --    TRADJENTA 5 MG TAB TABLET    Take 5 mg by mouth once daily.       Start Date: 9/26/2022 End Date: --    TRULICITY 1.5 MG/0.5 ML PEN INJECTOR    Inject into the skin.       Start Date: 10/7/2022 End Date: --   Discontinued Medications    ASPIRIN (ECOTRIN) 81 MG EC TABLET    Take 81 mg by mouth Daily.       Start Date: --        End Date: 1/4/2023    CARVEDILOL (COREG) 25 MG  TABLET    carvedilol 25 mg tablet       Start Date: --        End Date: 1/4/2023    HYDROCODONE-ACETAMINOPHEN (NORCO) 5-325 MG PER TABLET    hydrocodone 5 mg-acetaminophen 325 mg tablet   TAKE 1 TABLET BY MOUTH EVERY 4 HOURS AS NEEDED FOR PAIN       Start Date: --        End Date: 1/4/2023    SAXAGLIPTIN (ONGLYZA) 5 MG TAB TABLET    Onglyza 5 mg tablet       Start Date: --        End Date: 1/4/2023          No follow-ups on file. In addition to their scheduled follow up, the patient has also been instructed to follow up on as needed basis.       Video Time Documentation:  Spent 10 minutes with patient face to face discussed health concerns. More than 50% of this time was spent in counseling and coordination of care.  Answers submitted by the patient for this visit:  Sore Throat Questionnaire (Submitted on 1/4/2023)  Chief Complaint: Sore throat  Chronicity: new  Onset: in the past 7 days  Progression since onset: unchanged  Pain worse on: neither  Fever: no fever  Fever duration: less than 1 day  Pain - numeric: 4/10  drooling: No  hoarse voice: No  plugged ear sensation: Yes  swollen glands: No  trouble swallowing: No  strep: No  mono: No  Treatments tried: NSAIDs  Improvement on treatment: mild  Pain severity: mild

## 2023-01-05 ENCOUNTER — CLINICAL SUPPORT (OUTPATIENT)
Dept: FAMILY MEDICINE | Facility: CLINIC | Age: 58
End: 2023-01-05
Payer: MEDICAID

## 2023-01-05 DIAGNOSIS — J30.89 PERENNIAL ALLERGIC RHINITIS: Primary | ICD-10-CM

## 2023-01-05 PROCEDURE — 95115 PR IMMUNOTHERAPY, ONE INJECTION: ICD-10-PCS | Mod: ,,, | Performed by: FAMILY MEDICINE

## 2023-01-05 PROCEDURE — 95115 IMMUNOTHERAPY ONE INJECTION: CPT | Mod: ,,, | Performed by: FAMILY MEDICINE

## 2023-01-05 NOTE — PROGRESS NOTES
Pt presents today in clinic for a nurse visit to receive allergy shots. Admin 0.10mL of Vial A into back of RUE. Pt tolerated injections well and left office in good condition after being monitored for 20 minutes in office, no reaction noted. Pt scheduled in one week for next round of allergy injections.   Pt brought own allergy mix from allergist.  CIARA WALLS

## 2023-01-23 ENCOUNTER — PATIENT MESSAGE (OUTPATIENT)
Dept: ADMINISTRATIVE | Facility: HOSPITAL | Age: 58
End: 2023-01-23
Payer: MEDICAID

## 2023-01-26 ENCOUNTER — CLINICAL SUPPORT (OUTPATIENT)
Dept: FAMILY MEDICINE | Facility: CLINIC | Age: 58
End: 2023-01-26
Payer: MEDICAID

## 2023-01-26 DIAGNOSIS — J30.89 PERENNIAL ALLERGIC RHINITIS: Primary | ICD-10-CM

## 2023-01-26 PROCEDURE — 95115 IMMUNOTHERAPY ONE INJECTION: CPT | Mod: ,,, | Performed by: FAMILY MEDICINE

## 2023-01-26 PROCEDURE — 95115 PR IMMUNOTHERAPY, ONE INJECTION: ICD-10-PCS | Mod: ,,, | Performed by: FAMILY MEDICINE

## 2023-01-26 NOTE — PROGRESS NOTES
Pt presents today in clinic for a nurse visit to receive allergy shots. Admin 0.20mL of Vial A into back of LUE. Pt tolerated injections well and left office in good condition after being monitored for 20 minutes in office, no reaction noted. Pt scheduled in one week for next round of allergy injections.   Pt brought own allergy mix from allergist.  CIARA WALLS

## 2023-02-02 ENCOUNTER — CLINICAL SUPPORT (OUTPATIENT)
Dept: FAMILY MEDICINE | Facility: CLINIC | Age: 58
End: 2023-02-02
Payer: MEDICAID

## 2023-02-02 DIAGNOSIS — J30.89 PERENNIAL ALLERGIC RHINITIS: Primary | ICD-10-CM

## 2023-02-02 PROCEDURE — 95115 IMMUNOTHERAPY ONE INJECTION: CPT | Mod: ,,, | Performed by: FAMILY MEDICINE

## 2023-02-02 PROCEDURE — 95115 PR IMMUNOTHERAPY, ONE INJECTION: ICD-10-PCS | Mod: ,,, | Performed by: FAMILY MEDICINE

## 2023-02-09 ENCOUNTER — CLINICAL SUPPORT (OUTPATIENT)
Dept: FAMILY MEDICINE | Facility: CLINIC | Age: 58
End: 2023-02-09
Payer: MEDICAID

## 2023-02-09 DIAGNOSIS — J30.89 PERENNIAL ALLERGIC RHINITIS: Primary | ICD-10-CM

## 2023-02-09 PROCEDURE — 95115 IMMUNOTHERAPY ONE INJECTION: CPT | Mod: ,,, | Performed by: FAMILY MEDICINE

## 2023-02-09 PROCEDURE — 95115 PR IMMUNOTHERAPY, ONE INJECTION: ICD-10-PCS | Mod: ,,, | Performed by: FAMILY MEDICINE

## 2023-02-14 ENCOUNTER — TELEPHONE (OUTPATIENT)
Dept: FAMILY MEDICINE | Facility: CLINIC | Age: 58
End: 2023-02-14
Payer: MEDICAID

## 2023-02-14 NOTE — TELEPHONE ENCOUNTER
----- Message from Venita Forrest sent at 2/10/2023  2:39 PM CST -----  Regarding: Medcical  Jocelyne would like for [facility] to call her back regarding rash from her spot.[phone]    Type:  Needs Medical Advice    Who Called: Jocelyne  Would the patient rather a call back or a response via [facility] [employer]? Call back  Best Call Back Number: [phone]  Additional Information: Jocelyne would like a call her back from [facility].  She states Ni advised her to call after her shot.  She states now she has a red spot and its a lil bigger than before but not that bad.

## 2023-02-14 NOTE — TELEPHONE ENCOUNTER
Spoke with patient had a 50 cent pieces size reaction to her last allergy injection. Told her we will call allergist prior to next shot to ask what steps to take. She verbalized understanding.

## 2023-02-16 ENCOUNTER — CLINICAL SUPPORT (OUTPATIENT)
Dept: FAMILY MEDICINE | Facility: CLINIC | Age: 58
End: 2023-02-16
Payer: MEDICAID

## 2023-02-16 DIAGNOSIS — J32.9 SINUSITIS, UNSPECIFIED CHRONICITY, UNSPECIFIED LOCATION: Primary | ICD-10-CM

## 2023-02-16 PROCEDURE — 99211 OFF/OP EST MAY X REQ PHY/QHP: CPT | Mod: ,,, | Performed by: REGISTERED NURSE

## 2023-02-16 PROCEDURE — 99211 PR OFFICE/OUTPT VISIT, EST, LEVL I: ICD-10-PCS | Mod: ,,, | Performed by: REGISTERED NURSE

## 2023-02-16 RX ORDER — METHYLPREDNISOLONE ACETATE 80 MG/ML
40 INJECTION, SUSPENSION INTRA-ARTICULAR; INTRALESIONAL; INTRAMUSCULAR; SOFT TISSUE
Status: DISCONTINUED | OUTPATIENT
Start: 2023-02-16 | End: 2023-02-16

## 2023-02-16 RX ORDER — METHYLPREDNISOLONE ACETATE 40 MG/ML
40 INJECTION, SUSPENSION INTRA-ARTICULAR; INTRALESIONAL; INTRAMUSCULAR; SOFT TISSUE
Status: COMPLETED | OUTPATIENT
Start: 2023-02-16 | End: 2023-02-16

## 2023-02-16 RX ADMIN — METHYLPREDNISOLONE ACETATE 40 MG: 40 INJECTION, SUSPENSION INTRA-ARTICULAR; INTRALESIONAL; INTRAMUSCULAR; SOFT TISSUE at 02:02

## 2023-02-16 NOTE — PROGRESS NOTES
Patient came in with Wheezing and sob. Per Estela Negron, NP administered methylprednisolone 40 mg/ml. Injection was given into right upper glut. Patient tolerated injection well no se noted  idania Bautista  Exp: 07/2024  Lot [id number]

## 2023-02-22 DIAGNOSIS — G89.29 OTHER CHRONIC PAIN: ICD-10-CM

## 2023-02-22 DIAGNOSIS — K21.9 GASTRO-ESOPHAGEAL REFLUX DISEASE WITHOUT ESOPHAGITIS: ICD-10-CM

## 2023-02-22 RX ORDER — IBUPROFEN 800 MG/1
TABLET ORAL
Qty: 90 TABLET | Refills: 1 | Status: SHIPPED | OUTPATIENT
Start: 2023-02-22 | End: 2023-04-26

## 2023-02-22 RX ORDER — OMEPRAZOLE 40 MG/1
CAPSULE, DELAYED RELEASE ORAL
Qty: 30 CAPSULE | Refills: 5 | Status: SHIPPED | OUTPATIENT
Start: 2023-02-22 | End: 2023-09-05

## 2023-02-23 ENCOUNTER — CLINICAL SUPPORT (OUTPATIENT)
Dept: FAMILY MEDICINE | Facility: CLINIC | Age: 58
End: 2023-02-23
Payer: MEDICAID

## 2023-02-23 DIAGNOSIS — J30.89 PERENNIAL ALLERGIC RHINITIS: Primary | ICD-10-CM

## 2023-02-23 PROCEDURE — 95115 PR IMMUNOTHERAPY, ONE INJECTION: ICD-10-PCS | Mod: ,,, | Performed by: FAMILY MEDICINE

## 2023-02-23 PROCEDURE — 95115 IMMUNOTHERAPY ONE INJECTION: CPT | Mod: ,,, | Performed by: FAMILY MEDICINE

## 2023-02-23 NOTE — PROGRESS NOTES
Pt presents today in clinic for a nurse visit to receive allergy shots. Admin 0.35mL of Vial A into back of RUE. Pt tolerated injections well and left office in good condition after being monitored for 20 minutes in office, no reaction noted. Pt scheduled in one week for next round of allergy injections.   Pt brought own allergy mix from allergist.  CIARA WALLS

## 2023-03-02 ENCOUNTER — CLINICAL SUPPORT (OUTPATIENT)
Dept: FAMILY MEDICINE | Facility: CLINIC | Age: 58
End: 2023-03-02
Payer: MEDICAID

## 2023-03-02 DIAGNOSIS — J30.89 PERENNIAL ALLERGIC RHINITIS: Primary | ICD-10-CM

## 2023-03-02 PROCEDURE — 95115 PR IMMUNOTHERAPY, ONE INJECTION: ICD-10-PCS | Mod: ,,, | Performed by: FAMILY MEDICINE

## 2023-03-02 PROCEDURE — 95115 IMMUNOTHERAPY ONE INJECTION: CPT | Mod: ,,, | Performed by: FAMILY MEDICINE

## 2023-03-02 NOTE — PROGRESS NOTES
Pt presents today in clinic for a nurse visit to receive allergy shots. Admin 0.10mL of Vial A into back of LUE. Pt tolerated injections well and left office in good condition after being monitored for 20 minutes in office, no reaction noted. Pt scheduled in one week for next round of allergy injections.   Pt brought own allergy mix from allergist.  CIARA WALLS

## 2023-03-08 ENCOUNTER — DOCUMENTATION ONLY (OUTPATIENT)
Dept: FAMILY MEDICINE | Facility: CLINIC | Age: 58
End: 2023-03-08
Payer: MEDICAID

## 2023-03-08 LAB
LEFT EYE DM RETINOPATHY: NEGATIVE
RIGHT EYE DM RETINOPATHY: NEGATIVE

## 2023-03-09 ENCOUNTER — CLINICAL SUPPORT (OUTPATIENT)
Dept: FAMILY MEDICINE | Facility: CLINIC | Age: 58
End: 2023-03-09
Payer: MEDICAID

## 2023-03-09 DIAGNOSIS — J30.89 PERENNIAL ALLERGIC RHINITIS: Primary | ICD-10-CM

## 2023-03-09 PROCEDURE — 95115 PR IMMUNOTHERAPY, ONE INJECTION: ICD-10-PCS | Mod: ,,, | Performed by: FAMILY MEDICINE

## 2023-03-09 PROCEDURE — 95115 IMMUNOTHERAPY ONE INJECTION: CPT | Mod: ,,, | Performed by: FAMILY MEDICINE

## 2023-03-16 ENCOUNTER — CLINICAL SUPPORT (OUTPATIENT)
Dept: FAMILY MEDICINE | Facility: CLINIC | Age: 58
End: 2023-03-16
Payer: MEDICAID

## 2023-03-16 DIAGNOSIS — J30.89 PERENNIAL ALLERGIC RHINITIS: Primary | ICD-10-CM

## 2023-03-16 PROCEDURE — 95115 IMMUNOTHERAPY ONE INJECTION: CPT | Mod: ,,, | Performed by: FAMILY MEDICINE

## 2023-03-16 PROCEDURE — 95115 PR IMMUNOTHERAPY, ONE INJECTION: ICD-10-PCS | Mod: ,,, | Performed by: FAMILY MEDICINE

## 2023-03-16 NOTE — PROGRESS NOTES
Pt presents today in clinic for a nurse visit to receive allergy shots. Admin 0.30mL of Vial A into back of LUE. Pt tolerated injections well and left office in good condition after being monitored for 20 minutes in office, no reaction noted. Pt scheduled in one week for next round of allergy injections.   Pt brought own allergy mix from allergist.  CIARA WALLS

## 2023-03-23 ENCOUNTER — CLINICAL SUPPORT (OUTPATIENT)
Dept: FAMILY MEDICINE | Facility: CLINIC | Age: 58
End: 2023-03-23
Payer: MEDICAID

## 2023-03-23 DIAGNOSIS — J30.89 PERENNIAL ALLERGIC RHINITIS: Primary | ICD-10-CM

## 2023-03-23 PROCEDURE — 95115 PR IMMUNOTHERAPY, ONE INJECTION: ICD-10-PCS | Mod: ,,, | Performed by: FAMILY MEDICINE

## 2023-03-23 PROCEDURE — 95115 IMMUNOTHERAPY ONE INJECTION: CPT | Mod: ,,, | Performed by: FAMILY MEDICINE

## 2023-03-27 DIAGNOSIS — E11.9 TYPE 2 DIABETES MELLITUS WITHOUT COMPLICATIONS: ICD-10-CM

## 2023-03-27 DIAGNOSIS — J44.0 CHRONIC OBSTRUCTIVE PULMONARY DISEASE WITH (ACUTE) LOWER RESPIRATORY INFECTION: ICD-10-CM

## 2023-03-27 RX ORDER — LINAGLIPTIN 5 MG/1
TABLET, FILM COATED ORAL
Qty: 30 TABLET | Refills: 4 | Status: SHIPPED | OUTPATIENT
Start: 2023-03-27 | End: 2023-07-31

## 2023-03-27 RX ORDER — ALBUTEROL SULFATE 90 UG/1
AEROSOL, METERED RESPIRATORY (INHALATION)
Qty: 6.7 G | Refills: 4 | Status: SHIPPED | OUTPATIENT
Start: 2023-03-27 | End: 2023-11-02

## 2023-03-30 ENCOUNTER — CLINICAL SUPPORT (OUTPATIENT)
Dept: FAMILY MEDICINE | Facility: CLINIC | Age: 58
End: 2023-03-30
Payer: MEDICAID

## 2023-03-30 DIAGNOSIS — J30.89 PERENNIAL ALLERGIC RHINITIS: Primary | ICD-10-CM

## 2023-03-30 PROCEDURE — 95115 IMMUNOTHERAPY ONE INJECTION: CPT | Mod: ,,, | Performed by: FAMILY MEDICINE

## 2023-03-30 PROCEDURE — 95115 PR IMMUNOTHERAPY, ONE INJECTION: ICD-10-PCS | Mod: ,,, | Performed by: FAMILY MEDICINE

## 2023-04-06 ENCOUNTER — CLINICAL SUPPORT (OUTPATIENT)
Dept: FAMILY MEDICINE | Facility: CLINIC | Age: 58
End: 2023-04-06
Payer: MEDICAID

## 2023-04-06 DIAGNOSIS — J30.89 PERENNIAL ALLERGIC RHINITIS: Primary | ICD-10-CM

## 2023-04-06 PROCEDURE — 95115 PR IMMUNOTHERAPY, ONE INJECTION: ICD-10-PCS | Mod: ,,, | Performed by: FAMILY MEDICINE

## 2023-04-06 PROCEDURE — 95115 IMMUNOTHERAPY ONE INJECTION: CPT | Mod: ,,, | Performed by: FAMILY MEDICINE

## 2023-04-13 ENCOUNTER — CLINICAL SUPPORT (OUTPATIENT)
Dept: FAMILY MEDICINE | Facility: CLINIC | Age: 58
End: 2023-04-13
Payer: MEDICAID

## 2023-04-13 DIAGNOSIS — J30.89 PERENNIAL ALLERGIC RHINITIS: Primary | ICD-10-CM

## 2023-04-13 PROCEDURE — 95115 IMMUNOTHERAPY ONE INJECTION: CPT | Mod: ,,, | Performed by: FAMILY MEDICINE

## 2023-04-13 PROCEDURE — 95115 PR IMMUNOTHERAPY, ONE INJECTION: ICD-10-PCS | Mod: ,,, | Performed by: FAMILY MEDICINE

## 2023-04-17 ENCOUNTER — PATIENT MESSAGE (OUTPATIENT)
Dept: ADMINISTRATIVE | Facility: HOSPITAL | Age: 58
End: 2023-04-17
Payer: MEDICAID

## 2023-04-20 ENCOUNTER — CLINICAL SUPPORT (OUTPATIENT)
Dept: FAMILY MEDICINE | Facility: CLINIC | Age: 58
End: 2023-04-20
Payer: MEDICAID

## 2023-04-20 DIAGNOSIS — J30.89 PERENNIAL ALLERGIC RHINITIS: Primary | ICD-10-CM

## 2023-04-20 PROCEDURE — 95115 IMMUNOTHERAPY ONE INJECTION: CPT | Mod: ,,, | Performed by: FAMILY MEDICINE

## 2023-04-20 PROCEDURE — 95115 PR IMMUNOTHERAPY, ONE INJECTION: ICD-10-PCS | Mod: ,,, | Performed by: FAMILY MEDICINE

## 2023-04-26 DIAGNOSIS — G89.29 OTHER CHRONIC PAIN: ICD-10-CM

## 2023-04-26 RX ORDER — IBUPROFEN 800 MG/1
TABLET ORAL
Qty: 90 TABLET | Refills: 1 | Status: SHIPPED | OUTPATIENT
Start: 2023-04-26 | End: 2023-06-26

## 2023-04-27 ENCOUNTER — CLINICAL SUPPORT (OUTPATIENT)
Dept: FAMILY MEDICINE | Facility: CLINIC | Age: 58
End: 2023-04-27
Payer: MEDICAID

## 2023-04-27 DIAGNOSIS — J30.89 PERENNIAL ALLERGIC RHINITIS: Primary | ICD-10-CM

## 2023-05-01 ENCOUNTER — PATIENT MESSAGE (OUTPATIENT)
Dept: ADMINISTRATIVE | Facility: HOSPITAL | Age: 58
End: 2023-05-01
Payer: MEDICAID

## 2023-05-02 ENCOUNTER — PATIENT OUTREACH (OUTPATIENT)
Dept: ADMINISTRATIVE | Facility: HOSPITAL | Age: 58
End: 2023-05-02
Payer: MEDICAID

## 2023-05-02 NOTE — PROGRESS NOTES
Population Health. Out Reach.  The following record(s)  below were uploaded for Health Maintenance .    COLONOSCOPY     7/2/2021

## 2023-05-04 ENCOUNTER — CLINICAL SUPPORT (OUTPATIENT)
Dept: FAMILY MEDICINE | Facility: CLINIC | Age: 58
End: 2023-05-04
Payer: MEDICAID

## 2023-05-04 DIAGNOSIS — J30.89 PERENNIAL ALLERGIC RHINITIS: Primary | ICD-10-CM

## 2023-05-04 PROCEDURE — 95115 PR IMMUNOTHERAPY, ONE INJECTION: ICD-10-PCS | Mod: ,,, | Performed by: FAMILY MEDICINE

## 2023-05-04 PROCEDURE — 95115 IMMUNOTHERAPY ONE INJECTION: CPT | Mod: ,,, | Performed by: FAMILY MEDICINE

## 2023-05-04 NOTE — PROGRESS NOTES
Pt presents today in clinic for a nurse visit to receive allergy shots. Admin 0.35mL of Vial A into back of RUE and. Pt tolerated injections well and left office in good condition after being monitored for 20 minutes in office, no reaction noted. Pt scheduled in one week for next round of allergy injections.   Pt brought own allergy mix from allergist.  CIARA WALLS

## 2023-05-11 ENCOUNTER — CLINICAL SUPPORT (OUTPATIENT)
Dept: FAMILY MEDICINE | Facility: CLINIC | Age: 58
End: 2023-05-11
Payer: MEDICAID

## 2023-05-11 DIAGNOSIS — J30.89 PERENNIAL ALLERGIC RHINITIS: Primary | ICD-10-CM

## 2023-05-11 PROCEDURE — 95115 PR IMMUNOTHERAPY, ONE INJECTION: ICD-10-PCS | Mod: ,,, | Performed by: FAMILY MEDICINE

## 2023-05-11 PROCEDURE — 95115 IMMUNOTHERAPY ONE INJECTION: CPT | Mod: ,,, | Performed by: FAMILY MEDICINE

## 2023-05-18 ENCOUNTER — CLINICAL SUPPORT (OUTPATIENT)
Dept: FAMILY MEDICINE | Facility: CLINIC | Age: 58
End: 2023-05-18
Payer: MEDICAID

## 2023-05-18 DIAGNOSIS — F41.9 ANXIETY AND DEPRESSION: ICD-10-CM

## 2023-05-18 DIAGNOSIS — J30.89 PERENNIAL ALLERGIC RHINITIS: Primary | ICD-10-CM

## 2023-05-18 DIAGNOSIS — F32.A ANXIETY AND DEPRESSION: ICD-10-CM

## 2023-05-18 DIAGNOSIS — I10 ESSENTIAL HYPERTENSION: Primary | ICD-10-CM

## 2023-05-18 DIAGNOSIS — L29.9 PRURITUS: ICD-10-CM

## 2023-05-18 PROCEDURE — 95115 IMMUNOTHERAPY ONE INJECTION: CPT | Mod: ,,, | Performed by: FAMILY MEDICINE

## 2023-05-18 PROCEDURE — 95115 PR IMMUNOTHERAPY, ONE INJECTION: ICD-10-PCS | Mod: ,,, | Performed by: FAMILY MEDICINE

## 2023-05-18 RX ORDER — NIFEDIPINE 60 MG/1
TABLET, EXTENDED RELEASE ORAL
Qty: 30 TABLET | Refills: 5 | Status: SHIPPED | OUTPATIENT
Start: 2023-05-18 | End: 2023-11-29

## 2023-05-18 RX ORDER — CITALOPRAM 20 MG/1
TABLET, FILM COATED ORAL
Qty: 30 TABLET | Refills: 5 | Status: SHIPPED | OUTPATIENT
Start: 2023-05-18 | End: 2023-11-29

## 2023-05-18 RX ORDER — HYDROXYZINE PAMOATE 25 MG/1
CAPSULE ORAL
Qty: 30 CAPSULE | Refills: 1 | Status: SHIPPED | OUTPATIENT
Start: 2023-05-18 | End: 2023-06-26

## 2023-05-23 ENCOUNTER — OFFICE VISIT (OUTPATIENT)
Dept: FAMILY MEDICINE | Facility: CLINIC | Age: 58
End: 2023-05-23
Payer: MEDICAID

## 2023-05-23 VITALS
OXYGEN SATURATION: 99 % | TEMPERATURE: 98 F | DIASTOLIC BLOOD PRESSURE: 71 MMHG | HEIGHT: 64 IN | BODY MASS INDEX: 22.99 KG/M2 | HEART RATE: 66 BPM | SYSTOLIC BLOOD PRESSURE: 162 MMHG | WEIGHT: 134.63 LBS | RESPIRATION RATE: 18 BRPM

## 2023-05-23 DIAGNOSIS — Z12.4 CERVICAL CANCER SCREENING: ICD-10-CM

## 2023-05-23 DIAGNOSIS — G62.9 POLYNEUROPATHY, UNSPECIFIED: ICD-10-CM

## 2023-05-23 DIAGNOSIS — R53.83 FATIGUE, UNSPECIFIED TYPE: ICD-10-CM

## 2023-05-23 DIAGNOSIS — J30.89 PERENNIAL ALLERGIC RHINITIS: ICD-10-CM

## 2023-05-23 DIAGNOSIS — J43.9 PULMONARY EMPHYSEMA, UNSPECIFIED EMPHYSEMA TYPE: ICD-10-CM

## 2023-05-23 DIAGNOSIS — E11.65 TYPE 2 DIABETES MELLITUS WITH HYPERGLYCEMIA, WITHOUT LONG-TERM CURRENT USE OF INSULIN: Primary | ICD-10-CM

## 2023-05-23 PROCEDURE — 1159F PR MEDICATION LIST DOCUMENTED IN MEDICAL RECORD: ICD-10-PCS | Mod: CPTII,,, | Performed by: FAMILY MEDICINE

## 2023-05-23 PROCEDURE — 99214 OFFICE O/P EST MOD 30 MIN: CPT | Mod: 25,,, | Performed by: FAMILY MEDICINE

## 2023-05-23 PROCEDURE — 3077F SYST BP >= 140 MM HG: CPT | Mod: CPTII,,, | Performed by: FAMILY MEDICINE

## 2023-05-23 PROCEDURE — 4010F PR ACE/ARB THEARPY RXD/TAKEN: ICD-10-PCS | Mod: CPTII,,, | Performed by: FAMILY MEDICINE

## 2023-05-23 PROCEDURE — 1160F RVW MEDS BY RX/DR IN RCRD: CPT | Mod: CPTII,,, | Performed by: FAMILY MEDICINE

## 2023-05-23 PROCEDURE — 3008F BODY MASS INDEX DOCD: CPT | Mod: CPTII,,, | Performed by: FAMILY MEDICINE

## 2023-05-23 PROCEDURE — 95115 IMMUNOTHERAPY ONE INJECTION: CPT | Mod: ,,, | Performed by: FAMILY MEDICINE

## 2023-05-23 PROCEDURE — 3078F DIAST BP <80 MM HG: CPT | Mod: CPTII,,, | Performed by: FAMILY MEDICINE

## 2023-05-23 PROCEDURE — 4010F ACE/ARB THERAPY RXD/TAKEN: CPT | Mod: CPTII,,, | Performed by: FAMILY MEDICINE

## 2023-05-23 PROCEDURE — 3078F PR MOST RECENT DIASTOLIC BLOOD PRESSURE < 80 MM HG: ICD-10-PCS | Mod: CPTII,,, | Performed by: FAMILY MEDICINE

## 2023-05-23 PROCEDURE — 1160F PR REVIEW ALL MEDS BY PRESCRIBER/CLIN PHARMACIST DOCUMENTED: ICD-10-PCS | Mod: CPTII,,, | Performed by: FAMILY MEDICINE

## 2023-05-23 PROCEDURE — 3077F PR MOST RECENT SYSTOLIC BLOOD PRESSURE >= 140 MM HG: ICD-10-PCS | Mod: CPTII,,, | Performed by: FAMILY MEDICINE

## 2023-05-23 PROCEDURE — 95115 PR IMMUNOTHERAPY, ONE INJECTION: ICD-10-PCS | Mod: ,,, | Performed by: FAMILY MEDICINE

## 2023-05-23 PROCEDURE — 99214 PR OFFICE/OUTPT VISIT, EST, LEVL IV, 30-39 MIN: ICD-10-PCS | Mod: 25,,, | Performed by: FAMILY MEDICINE

## 2023-05-23 PROCEDURE — 1159F MED LIST DOCD IN RCRD: CPT | Mod: CPTII,,, | Performed by: FAMILY MEDICINE

## 2023-05-23 PROCEDURE — 3008F PR BODY MASS INDEX (BMI) DOCUMENTED: ICD-10-PCS | Mod: CPTII,,, | Performed by: FAMILY MEDICINE

## 2023-05-23 RX ORDER — GABAPENTIN 600 MG/1
TABLET ORAL
Qty: 120 TABLET | Refills: 4 | Status: SHIPPED | OUTPATIENT
Start: 2023-05-23 | End: 2023-08-24 | Stop reason: DRUGHIGH

## 2023-05-23 NOTE — PROGRESS NOTES
Patient ID: [id number]     Chief Complaint: Follow-up and Immunotherapy        HPI:     Jocelyne Dickinson is a 58 y.o. female here today for Follow-up and Immunotherapy. Chronic fatigue, works nights.         ----------------------------  Anxiety disorder, unspecified  Carotid artery stenosis  Cellulitis of scalp  COPD (chronic obstructive pulmonary disease)  Diabetes mellitus  HTN (hypertension)  Mixed hyperlipidemia  Neuropathy  Osteoarthritis  Pneumonia, unspecified organism  Sleep apnea, unspecified  Syncope     Past Surgical History:   Procedure Laterality Date    CATARACT EXTRACTION      COLONOSCOPY  2021    Dr. Nila Sharma    REPAIR OF CAROTID ARTERY      TONSILLECTOMY         Review of patient's allergies indicates:   Allergen Reactions    Aller ext-american cockroach Itching and Other (See Comments)    Allerg ext-tree poll-red maple Itching and Other (See Comments)    Cat hair standardized allergenic extract Itching and Other (See Comments)    Dog hair standardized allergenic extract Itching and Other (See Comments)    Grass pollen-ruslan, standard Itching and Other (See Comments)    Horse dander Itching and Other (See Comments)    Tree pollen-box elder Itching and Other (See Comments)    Tree pollen-pecan Itching and Other (See Comments)       Outpatient Medications Marked as Taking for the 23 encounter (Office Visit) with Edi Roldan, DO   Medication Sig Dispense Refill    albuterol (PROVENTIL/VENTOLIN HFA) 90 mcg/actuation inhaler inhale TWO puffs EVERY 4 TO 6 HOURS AS NEEDED FOR wheezing 6.7 g 4    ALPRAZolam (XANAX) 0.5 MG tablet TAKE ONE TABLET BY MOUTH TWICE DAILY 60 tablet 4    atorvastatin (LIPITOR) 20 MG tablet Take 1 tablet (20 mg total) by mouth once daily. 90 tablet 3    azelastine (ASTELIN) 137 mcg (0.1 %) nasal spray SMARTSI-2 Spray(s) Both Nares Twice Daily      citalopram (CELEXA) 20 MG tablet TAKE ONE TABLET BY MOUTH DAILY 30 tablet 5    clopidogreL (PLAVIX) 75 mg  tablet Take 75 mg by mouth once daily.      dulaglutide (TRULICITY) 1.5 mg/0.5 mL pen injector INJECT 0.5 ML EVERY WEEK BY SUBCUTANEOUS ROUTE. 4 pen 6    fluticasone propionate (FLONASE) 50 mcg/actuation nasal spray USE 1 SPRAY IN EACH NOSTRIL DAILY 16 g 4    gabapentin (NEURONTIN) 600 MG tablet TAKE 1 TABLET BY MOUTH FOUR TIMES DAILY 120 tablet 4    glimepiride (AMARYL) 2 MG tablet Take 1 tablet (2 mg total) by mouth 2 (two) times a day. 180 tablet 3    hydrOXYzine pamoate (VISTARIL) 25 MG Cap TAKE ONE CAPSULE BY MOUTH DAILY 30 capsule 1    ibuprofen (ADVIL,MOTRIN) 800 MG tablet TAKE ONE TABLET BY MOUTH THREE TIMES DAILY WITH FOOD AS NEEDED FOR PAIN 90 tablet 1    losartan (COZAAR) 100 MG tablet TAKE 1 TABLET BY MOUTH DAILY FOR BLOOD PRESSURE 30 tablet 5    NIFEdipine (PROCARDIA-XL) 60 MG (OSM) 24 hr tablet TAKE ONE TABLET BY MOUTH DAILY FOR BLOOD PRESSURE 30 tablet 5    omeprazole (PRILOSEC) 40 MG capsule TAKE ONE CAPSULE BY MOUTH DAILY for stomach 30 capsule 5    sucralfate (CARAFATE) 1 gram tablet sucralfate 1 gram tablet      SYMBICORT 160-4.5 mcg/actuation HFAA INHALE 2 PUFFS TWICE A DAY 10.2 g 6    TRADJENTA 5 mg Tab tablet TAKE 1 TABLET BY MOUTH DAILY 30 tablet 4       Social History     Socioeconomic History    Marital status: Single   Tobacco Use    Smoking status: Every Day     Types: Vaping with nicotine    Smokeless tobacco: Never   Substance and Sexual Activity    Alcohol use: Not Currently    Drug use: Never     Social Determinants of Health     Financial Resource Strain: Low Risk     Difficulty of Paying Living Expenses: Not hard at all   Food Insecurity: No Food Insecurity    Worried About Running Out of Food in the Last Year: Never true    Ran Out of Food in the Last Year: Never true   Transportation Needs: No Transportation Needs    Lack of Transportation (Medical): No    Lack of Transportation (Non-Medical): No   Physical Activity: Sufficiently Active    Days of Exercise per Week: 7 days     "Minutes of Exercise per Session: 30 min   Stress: Stress Concern Present    Feeling of Stress : To some extent   Social Connections: Socially Integrated    Frequency of Communication with Friends and Family: More than three times a week    Frequency of Social Gatherings with Friends and Family: More than three times a week    Attends Scientologist Services: More than 4 times per year    Active Member of Clubs or Organizations: Yes    Attends Club or Organization Meetings: More than 4 times per year    Marital Status:    Housing Stability: Low Risk     Unable to Pay for Housing in the Last Year: No    Number of Places Lived in the Last Year: 1    Unstable Housing in the Last Year: No        Family History   Problem Relation Age of Onset    Hypertension Father     Heart disease Father     Cancer Father     Cancer Maternal Grandmother     Vision loss Maternal Grandmother         Patient Care Team:  Edi Roldan DO as PCP - General (Family Medicine)  Drew Gardiner MD as Consulting Physician (Otolaryngology)     Subjective:     Review of Systems   Constitutional:  Positive for malaise/fatigue. Negative for chills and fever.   Respiratory:  Positive for cough, shortness of breath and wheezing. Negative for sputum production.    Cardiovascular:  Negative for chest pain.   Gastrointestinal:  Negative for constipation and diarrhea.   Neurological:  Negative for dizziness and headaches.   Psychiatric/Behavioral:  The patient has insomnia.      See HPI for details  All Other ROS: Negative except as stated in HPI.       Objective:     BP (!) 162/71   Pulse 66   Temp 97.7 °F (36.5 °C) (Tympanic)   Resp 18   Ht 5' 3.78" (1.62 m)   Wt 61.1 kg (134 lb 9.6 oz)   SpO2 99%   BMI 23.26 kg/m²     Physical Exam  Vitals reviewed.   Constitutional:       General: She is not in acute distress.     Appearance: Normal appearance.   Cardiovascular:      Rate and Rhythm: Normal rate and regular rhythm.      Heart sounds: No " murmur heard.    No friction rub. No gallop.   Pulmonary:      Effort: No respiratory distress.      Breath sounds: No wheezing, rhonchi or rales.   Musculoskeletal:         General: No swelling, tenderness or deformity.      Right lower leg: No edema.      Left lower leg: No edema.   Skin:     General: Skin is warm and dry.      Findings: No lesion or rash.   Neurological:      General: No focal deficit present.      Mental Status: She is alert.   Psychiatric:         Mood and Affect: Mood normal.       Assessment/Plan:     1. Type 2 diabetes mellitus with hyperglycemia, without long-term current use of insulin  -     Lipid Panel; Future; Expected date: 05/23/2023  -     Comprehensive Metabolic Panel; Future; Expected date: 05/23/2023  -     Hemoglobin A1C; Future; Expected date: 05/23/2023  -     Microalbumin/Creatinine Ratio, Urine; Future; Expected date: 05/23/2023    2. Perennial allergic rhinitis    3. Pulmonary emphysema, unspecified emphysema type  -     Ambulatory referral/consult to Pulmonology; Future; Expected date: 05/23/2023    4. Fatigue, unspecified type  -     TSH; Future; Expected date: 05/23/2023  -     T4, Free; Future; Expected date: 05/23/2023    5. Cervical cancer screening  -     Ambulatory referral/consult to Obstetrics / Gynecology; Future; Expected date: 05/23/2023          Follow up:     Follow up in about 3 months (around 8/23/2023) for Follow up. In addition to their scheduled follow up, the patient has also been instructed to follow up on as needed basis.

## 2023-05-23 NOTE — PROGRESS NOTES
Pt presents today in clinic to receive allergy shots. Admin 0.30mL of Vial A into back of LUE. Pt tolerated injection well and left office in good condition after being monitored for 20 minutes in office, no reaction noted. Pt scheduled in one week for next round of allergy injections.   Pt brought own allergy mix from allergist.  CIARA Mayes

## 2023-05-24 ENCOUNTER — LAB VISIT (OUTPATIENT)
Dept: LAB | Facility: HOSPITAL | Age: 58
End: 2023-05-24
Attending: FAMILY MEDICINE
Payer: MEDICAID

## 2023-05-24 DIAGNOSIS — R53.83 FATIGUE, UNSPECIFIED TYPE: ICD-10-CM

## 2023-05-24 DIAGNOSIS — E11.65 TYPE 2 DIABETES MELLITUS WITH HYPERGLYCEMIA, WITHOUT LONG-TERM CURRENT USE OF INSULIN: ICD-10-CM

## 2023-05-24 LAB
ALBUMIN SERPL-MCNC: 4 G/DL (ref 3.5–5)
ALBUMIN/GLOB SERPL: 1.5 RATIO (ref 1.1–2)
ALP SERPL-CCNC: 81 UNIT/L (ref 40–150)
ALT SERPL-CCNC: 10 UNIT/L (ref 0–55)
AST SERPL-CCNC: 15 UNIT/L (ref 5–34)
BILIRUBIN DIRECT+TOT PNL SERPL-MCNC: 0.4 MG/DL
BUN SERPL-MCNC: 15 MG/DL (ref 9.8–20.1)
CALCIUM SERPL-MCNC: 10.4 MG/DL (ref 8.4–10.2)
CHLORIDE SERPL-SCNC: 105 MMOL/L (ref 98–107)
CHOLEST SERPL-MCNC: 181 MG/DL
CHOLEST/HDLC SERPL: 4 {RATIO} (ref 0–5)
CO2 SERPL-SCNC: 31 MMOL/L (ref 22–29)
CREAT SERPL-MCNC: 0.95 MG/DL (ref 0.55–1.02)
CREAT UR-MCNC: 134.2 MG/DL (ref 47–110)
EST. AVERAGE GLUCOSE BLD GHB EST-MCNC: 159.9 MG/DL
GFR SERPLBLD CREATININE-BSD FMLA CKD-EPI: >60 MLS/MIN/1.73/M2
GLOBULIN SER-MCNC: 2.7 GM/DL (ref 2.4–3.5)
GLUCOSE SERPL-MCNC: 165 MG/DL (ref 74–100)
HBA1C MFR BLD: 7.2 %
HDLC SERPL-MCNC: 49 MG/DL (ref 35–60)
LDLC SERPL CALC-MCNC: 92 MG/DL (ref 50–140)
MICROALBUMIN UR-MCNC: 9.7 UG/ML
MICROALBUMIN/CREAT RATIO PNL UR: 7.2 MG/GM CR (ref 0–30)
POTASSIUM SERPL-SCNC: 3.7 MMOL/L (ref 3.5–5.1)
PROT SERPL-MCNC: 6.7 GM/DL (ref 6.4–8.3)
SODIUM SERPL-SCNC: 144 MMOL/L (ref 136–145)
T4 FREE SERPL-MCNC: 0.82 NG/DL (ref 0.7–1.48)
TRIGL SERPL-MCNC: 200 MG/DL (ref 37–140)
TSH SERPL-ACNC: 3.13 UIU/ML (ref 0.35–4.94)
VLDLC SERPL CALC-MCNC: 40 MG/DL

## 2023-05-24 PROCEDURE — 83036 HEMOGLOBIN GLYCOSYLATED A1C: CPT

## 2023-05-24 PROCEDURE — 36415 COLL VENOUS BLD VENIPUNCTURE: CPT

## 2023-05-24 PROCEDURE — 84439 ASSAY OF FREE THYROXINE: CPT

## 2023-05-24 PROCEDURE — 82043 UR ALBUMIN QUANTITATIVE: CPT

## 2023-05-24 PROCEDURE — 80053 COMPREHEN METABOLIC PANEL: CPT

## 2023-05-24 PROCEDURE — 80061 LIPID PANEL: CPT

## 2023-05-24 PROCEDURE — 84443 ASSAY THYROID STIM HORMONE: CPT

## 2023-05-30 RX ORDER — ALPRAZOLAM 0.5 MG/1
TABLET ORAL
Qty: 60 TABLET | Refills: 4 | Status: SHIPPED | OUTPATIENT
Start: 2023-05-30 | End: 2023-10-02 | Stop reason: SDUPTHER

## 2023-06-01 ENCOUNTER — CLINICAL SUPPORT (OUTPATIENT)
Dept: FAMILY MEDICINE | Facility: CLINIC | Age: 58
End: 2023-06-01
Payer: MEDICAID

## 2023-06-01 DIAGNOSIS — J30.89 PERENNIAL ALLERGIC RHINITIS: Primary | ICD-10-CM

## 2023-06-01 PROCEDURE — 95115 PR IMMUNOTHERAPY, ONE INJECTION: ICD-10-PCS | Mod: ,,, | Performed by: FAMILY MEDICINE

## 2023-06-01 PROCEDURE — 95115 IMMUNOTHERAPY ONE INJECTION: CPT | Mod: ,,, | Performed by: FAMILY MEDICINE

## 2023-06-08 ENCOUNTER — CLINICAL SUPPORT (OUTPATIENT)
Dept: FAMILY MEDICINE | Facility: CLINIC | Age: 58
End: 2023-06-08
Payer: MEDICAID

## 2023-06-08 DIAGNOSIS — J30.89 PERENNIAL ALLERGIC RHINITIS: Primary | ICD-10-CM

## 2023-06-08 PROCEDURE — 95115 IMMUNOTHERAPY ONE INJECTION: CPT | Mod: ,,, | Performed by: FAMILY MEDICINE

## 2023-06-08 PROCEDURE — 95115 PR IMMUNOTHERAPY, ONE INJECTION: ICD-10-PCS | Mod: ,,, | Performed by: FAMILY MEDICINE

## 2023-06-15 ENCOUNTER — CLINICAL SUPPORT (OUTPATIENT)
Dept: FAMILY MEDICINE | Facility: CLINIC | Age: 58
End: 2023-06-15
Payer: MEDICAID

## 2023-06-15 DIAGNOSIS — J30.89 PERENNIAL ALLERGIC RHINITIS: Primary | ICD-10-CM

## 2023-06-15 PROCEDURE — 95115 PR IMMUNOTHERAPY, ONE INJECTION: ICD-10-PCS | Mod: ,,, | Performed by: FAMILY MEDICINE

## 2023-06-15 PROCEDURE — 95115 IMMUNOTHERAPY ONE INJECTION: CPT | Mod: ,,, | Performed by: FAMILY MEDICINE

## 2023-06-15 NOTE — PROGRESS NOTES
Pt presents today in clinic for a nurse visit to receive allergy shots. Admin 0.35mL of Vial A into back of RUE. Pt tolerated injections well and left office in good condition after being monitored for 20 minutes in office, no reaction noted. Per [facility] request, vials were released to patient to continue maintenance doses at home.   Pt brought own allergy mix from allergist.  CIARA Mayes

## 2023-06-26 ENCOUNTER — PATIENT MESSAGE (OUTPATIENT)
Dept: ADMINISTRATIVE | Facility: HOSPITAL | Age: 58
End: 2023-06-26
Payer: MEDICAID

## 2023-06-26 DIAGNOSIS — L29.9 PRURITUS: ICD-10-CM

## 2023-06-26 DIAGNOSIS — G89.29 OTHER CHRONIC PAIN: ICD-10-CM

## 2023-06-26 DIAGNOSIS — I10 ESSENTIAL HYPERTENSION: Primary | ICD-10-CM

## 2023-06-26 RX ORDER — LOSARTAN POTASSIUM 100 MG/1
TABLET ORAL
Qty: 30 TABLET | Refills: 5 | Status: SHIPPED | OUTPATIENT
Start: 2023-06-26

## 2023-06-26 RX ORDER — HYDROXYZINE PAMOATE 25 MG/1
CAPSULE ORAL
Qty: 30 CAPSULE | Refills: 1 | Status: SHIPPED | OUTPATIENT
Start: 2023-06-26 | End: 2023-09-05

## 2023-06-26 RX ORDER — IBUPROFEN 800 MG/1
TABLET ORAL
Qty: 90 TABLET | Refills: 1 | Status: SHIPPED | OUTPATIENT
Start: 2023-06-26 | End: 2023-09-05

## 2023-07-31 DIAGNOSIS — E11.9 TYPE 2 DIABETES MELLITUS WITHOUT COMPLICATIONS: ICD-10-CM

## 2023-07-31 DIAGNOSIS — R06.2 WHEEZING: ICD-10-CM

## 2023-07-31 RX ORDER — BUDESONIDE AND FORMOTEROL FUMARATE DIHYDRATE 160; 4.5 UG/1; UG/1
AEROSOL RESPIRATORY (INHALATION)
Qty: 10.2 G | Refills: 6 | Status: SHIPPED | OUTPATIENT
Start: 2023-07-31 | End: 2024-03-26

## 2023-07-31 RX ORDER — LINAGLIPTIN 5 MG/1
TABLET, FILM COATED ORAL
Qty: 30 TABLET | Refills: 5 | Status: SHIPPED | OUTPATIENT
Start: 2023-07-31 | End: 2024-01-30

## 2023-08-24 ENCOUNTER — OFFICE VISIT (OUTPATIENT)
Dept: FAMILY MEDICINE | Facility: CLINIC | Age: 58
End: 2023-08-24
Payer: MEDICAID

## 2023-08-24 VITALS
HEIGHT: 64 IN | DIASTOLIC BLOOD PRESSURE: 77 MMHG | RESPIRATION RATE: 18 BRPM | WEIGHT: 137 LBS | TEMPERATURE: 98 F | SYSTOLIC BLOOD PRESSURE: 158 MMHG | OXYGEN SATURATION: 99 % | BODY MASS INDEX: 23.39 KG/M2 | HEART RATE: 56 BPM

## 2023-08-24 DIAGNOSIS — G47.00 INSOMNIA, UNSPECIFIED TYPE: ICD-10-CM

## 2023-08-24 DIAGNOSIS — E11.42 DIABETIC POLYNEUROPATHY ASSOCIATED WITH TYPE 2 DIABETES MELLITUS: ICD-10-CM

## 2023-08-24 DIAGNOSIS — Z00.00 ROUTINE GENERAL MEDICAL EXAMINATION AT A HEALTH CARE FACILITY: Primary | ICD-10-CM

## 2023-08-24 DIAGNOSIS — Z12.31 BREAST CANCER SCREENING BY MAMMOGRAM: ICD-10-CM

## 2023-08-24 PROCEDURE — 99396 PR PREVENTIVE VISIT,EST,40-64: ICD-10-PCS | Mod: ,,, | Performed by: FAMILY MEDICINE

## 2023-08-24 PROCEDURE — 2023F PR DILATED RETINAL EXAM W/O EVID OF RETINOPATHY: ICD-10-PCS | Mod: CPTII,,, | Performed by: FAMILY MEDICINE

## 2023-08-24 PROCEDURE — 3008F PR BODY MASS INDEX (BMI) DOCUMENTED: ICD-10-PCS | Mod: CPTII,,, | Performed by: FAMILY MEDICINE

## 2023-08-24 PROCEDURE — 2023F DILAT RTA XM W/O RTNOPTHY: CPT | Mod: CPTII,,, | Performed by: FAMILY MEDICINE

## 2023-08-24 PROCEDURE — 1159F PR MEDICATION LIST DOCUMENTED IN MEDICAL RECORD: ICD-10-PCS | Mod: CPTII,,, | Performed by: FAMILY MEDICINE

## 2023-08-24 PROCEDURE — 4010F ACE/ARB THERAPY RXD/TAKEN: CPT | Mod: CPTII,,, | Performed by: FAMILY MEDICINE

## 2023-08-24 PROCEDURE — 1159F MED LIST DOCD IN RCRD: CPT | Mod: CPTII,,, | Performed by: FAMILY MEDICINE

## 2023-08-24 PROCEDURE — 3061F PR NEG MICROALBUMINURIA RESULT DOCUMENTED/REVIEW: ICD-10-PCS | Mod: CPTII,,, | Performed by: FAMILY MEDICINE

## 2023-08-24 PROCEDURE — 99396 PREV VISIT EST AGE 40-64: CPT | Mod: ,,, | Performed by: FAMILY MEDICINE

## 2023-08-24 PROCEDURE — 3008F BODY MASS INDEX DOCD: CPT | Mod: CPTII,,, | Performed by: FAMILY MEDICINE

## 2023-08-24 PROCEDURE — 3061F NEG MICROALBUMINURIA REV: CPT | Mod: CPTII,,, | Performed by: FAMILY MEDICINE

## 2023-08-24 PROCEDURE — 1160F PR REVIEW ALL MEDS BY PRESCRIBER/CLIN PHARMACIST DOCUMENTED: ICD-10-PCS | Mod: CPTII,,, | Performed by: FAMILY MEDICINE

## 2023-08-24 PROCEDURE — 3078F PR MOST RECENT DIASTOLIC BLOOD PRESSURE < 80 MM HG: ICD-10-PCS | Mod: CPTII,,, | Performed by: FAMILY MEDICINE

## 2023-08-24 PROCEDURE — 4010F PR ACE/ARB THEARPY RXD/TAKEN: ICD-10-PCS | Mod: CPTII,,, | Performed by: FAMILY MEDICINE

## 2023-08-24 PROCEDURE — 3066F NEPHROPATHY DOC TX: CPT | Mod: CPTII,,, | Performed by: FAMILY MEDICINE

## 2023-08-24 PROCEDURE — 3051F PR MOST RECENT HEMOGLOBIN A1C LEVEL 7.0 - < 8.0%: ICD-10-PCS | Mod: CPTII,,, | Performed by: FAMILY MEDICINE

## 2023-08-24 PROCEDURE — 3066F PR DOCUMENTATION OF TREATMENT FOR NEPHROPATHY: ICD-10-PCS | Mod: CPTII,,, | Performed by: FAMILY MEDICINE

## 2023-08-24 PROCEDURE — 3078F DIAST BP <80 MM HG: CPT | Mod: CPTII,,, | Performed by: FAMILY MEDICINE

## 2023-08-24 PROCEDURE — 3077F SYST BP >= 140 MM HG: CPT | Mod: CPTII,,, | Performed by: FAMILY MEDICINE

## 2023-08-24 PROCEDURE — 1160F RVW MEDS BY RX/DR IN RCRD: CPT | Mod: CPTII,,, | Performed by: FAMILY MEDICINE

## 2023-08-24 PROCEDURE — 3051F HG A1C>EQUAL 7.0%<8.0%: CPT | Mod: CPTII,,, | Performed by: FAMILY MEDICINE

## 2023-08-24 PROCEDURE — 3077F PR MOST RECENT SYSTOLIC BLOOD PRESSURE >= 140 MM HG: ICD-10-PCS | Mod: CPTII,,, | Performed by: FAMILY MEDICINE

## 2023-08-24 RX ORDER — GABAPENTIN 300 MG/1
300 CAPSULE ORAL NIGHTLY
Qty: 30 CAPSULE | Refills: 11 | Status: SHIPPED | OUTPATIENT
Start: 2023-08-24 | End: 2024-08-23

## 2023-08-24 RX ORDER — MIRTAZAPINE 7.5 MG/1
7.5 TABLET, FILM COATED ORAL NIGHTLY
Qty: 30 TABLET | Refills: 11 | Status: SHIPPED | OUTPATIENT
Start: 2023-08-24 | End: 2024-08-23

## 2023-08-24 RX ORDER — TIOTROPIUM BROMIDE 18 UG/1
1 CAPSULE ORAL; RESPIRATORY (INHALATION) DAILY
COMMUNITY
Start: 2023-07-20

## 2023-08-24 RX ORDER — HYDROCORTISONE 25 MG/G
CREAM TOPICAL
COMMUNITY
Start: 2023-07-31

## 2023-08-24 NOTE — PROGRESS NOTES
Patient ID: [id number]     Chief Complaint: Follow-up        HPI:     Jocelyne Dickinson is a 58 y.o. female here today for an annual wellness visit. She has not had bloodwork completed prior to visit as ordered but will do so in the near future. Overall she feels well. No other complaints today.     ----------------------------  Anxiety disorder, unspecified  Carotid artery stenosis  Cellulitis of scalp  COPD (chronic obstructive pulmonary disease)  Diabetes mellitus  Emphysema, unspecified  HTN (hypertension)  Mixed hyperlipidemia  Neuropathy  Osteoarthritis  Personal history of nicotine dependence  Pneumonia, unspecified organism  Sleep apnea, unspecified  Syncope     Past Surgical History:   Procedure Laterality Date    CATARACT EXTRACTION      COLONOSCOPY  07/02/2021    Dr. Nila Sharma    REPAIR OF CAROTID ARTERY      TONSILLECTOMY         Review of patient's allergies indicates:   Allergen Reactions    Aller ext-american cockroach Itching and Other (See Comments)    Allerg ext-tree poll-red maple Itching and Other (See Comments)    Cat hair standardized allergenic extract Itching and Other (See Comments)    Dog hair standardized allergenic extract Itching and Other (See Comments)    Grass pollen-ruslan, standard Itching and Other (See Comments)    Horse dander Itching and Other (See Comments)    Tree pollen-box elder Itching and Other (See Comments)    Tree pollen-pecan Itching and Other (See Comments)       Outpatient Medications Marked as Taking for the 8/24/23 encounter (Office Visit) with Edi Roldan, DO   Medication Sig Dispense Refill    albuterol (PROVENTIL/VENTOLIN HFA) 90 mcg/actuation inhaler inhale TWO puffs EVERY 4 TO 6 HOURS AS NEEDED FOR wheezing 6.7 g 4    ALPRAZolam (XANAX) 0.5 MG tablet TAKE ONE TABLET BY MOUTH TWICE DAILY 60 tablet 4    atorvastatin (LIPITOR) 20 MG tablet Take 1 tablet (20 mg total) by mouth once daily. 90 tablet 3    azelastine (ASTELIN) 137 mcg (0.1 %) nasal spray  SMARTSI-2 Spray(s) Both Nares Twice Daily      citalopram (CELEXA) 20 MG tablet TAKE ONE TABLET BY MOUTH DAILY 30 tablet 5    clopidogreL (PLAVIX) 75 mg tablet Take 75 mg by mouth once daily.      dulaglutide (TRULICITY) 1.5 mg/0.5 mL pen injector INJECT 0.5 ML EVERY WEEK BY SUBCUTANEOUS ROUTE. 4 pen 6    fluticasone propionate (FLONASE) 50 mcg/actuation nasal spray USE 1 SPRAY IN EACH NOSTRIL DAILY 16 g 4    glimepiride (AMARYL) 2 MG tablet Take 1 tablet (2 mg total) by mouth 2 (two) times a day. 180 tablet 3    hydrocortisone 2.5 % cream SMARTSIG:sparingly Topical 3 Times Daily      hydrOXYzine pamoate (VISTARIL) 25 MG Cap TAKE ONE CAPSULE BY MOUTH DAILY 30 capsule 1     mg tablet TAKE ONE TABLET BY MOUTH THREE TIMES DAILY WITH FOOD AS NEEDED FOR PAIN 90 tablet 1    linaGLIPtin (TRADJENTA) 5 mg Tab tablet TAKE 1 TABLET BY MOUTH DAILY 30 tablet 5    losartan (COZAAR) 100 MG tablet TAKE 1 TABLET BY MOUTH DAILY FOR BLOOD PRESSURE 30 tablet 5    NIFEdipine (PROCARDIA-XL) 60 MG (OSM) 24 hr tablet TAKE ONE TABLET BY MOUTH DAILY FOR BLOOD PRESSURE 30 tablet 5    omeprazole (PRILOSEC) 40 MG capsule TAKE ONE CAPSULE BY MOUTH DAILY for stomach 30 capsule 5    SPIRIVA WITH HANDIHALER 18 mcg inhalation capsule Inhale 1 capsule into the lungs Daily.      SYMBICORT 160-4.5 mcg/actuation HFAA INHALE 2 PUFFS TWICE A DAY 10.2 g 6    [DISCONTINUED] gabapentin (NEURONTIN) 600 MG tablet TAKE 1 TABLET BY MOUTH FOUR TIMES DAILY 120 tablet 4       Social History     Socioeconomic History    Marital status: Single   Tobacco Use    Smoking status: Every Day     Current packs/day: 0.00     Average packs/day: 1.5 packs/day for 40.0 years (60.0 ttl pk-yrs)     Types: Vaping with nicotine, Cigarettes     Start date:      Last attempt to quit: 2016     Years since quittin.6    Smokeless tobacco: Never   Substance and Sexual Activity    Alcohol use: Not Currently    Drug use: Never    Sexual activity: Not Currently     Social  Determinants of Health     Financial Resource Strain: Low Risk  (5/23/2023)    Overall Financial Resource Strain (CARDIA)     Difficulty of Paying Living Expenses: Not hard at all   Food Insecurity: No Food Insecurity (5/23/2023)    Hunger Vital Sign     Worried About Running Out of Food in the Last Year: Never true     Ran Out of Food in the Last Year: Never true   Transportation Needs: No Transportation Needs (5/23/2023)    PRAPARE - Transportation     Lack of Transportation (Medical): No     Lack of Transportation (Non-Medical): No   Physical Activity: Sufficiently Active (5/23/2023)    Exercise Vital Sign     Days of Exercise per Week: 7 days     Minutes of Exercise per Session: 30 min   Stress: Stress Concern Present (5/23/2023)    [employer] [place] [employer] - Occupational Stress Questionnaire     Feeling of Stress : To some extent   Social Connections: Socially Integrated (5/23/2023)    Social Connection and Isolation Panel [NHANES]     Frequency of Communication with Friends and Family: More than three times a week     Frequency of Social Gatherings with Friends and Family: More than three times a week     Attends Yazidism Services: More than 4 times per year     Active Member of Clubs or Organizations: Yes     Attends Club or Organization Meetings: More than 4 times per year     Marital Status:    Housing Stability: Low Risk  (5/23/2023)    Housing Stability Vital Sign     Unable to Pay for Housing in the Last Year: No     Number of Places Lived in the Last Year: 1     Unstable Housing in the Last Year: No        Family History   Problem Relation Age of Onset    Hypertension Mother     Hypertension Father     Heart disease Father     Cancer Father     Stroke Father     Cancer Maternal Grandmother     Vision loss Maternal Grandmother         Patient Care Team:  Edi Roldan DO as PCP - General (Family Medicine)  Drew Gardiner MD as Consulting Physician  "(Otolaryngology)  Ricky Foster MD as Consulting Physician (Pulmonary Disease)       Subjective:     Review of Systems   Constitutional:  Positive for malaise/fatigue. Negative for chills and fever.   Respiratory:  Negative for shortness of breath.    Cardiovascular:  Negative for chest pain.   Gastrointestinal:  Negative for constipation and diarrhea.   Neurological:  Positive for headaches. Negative for dizziness.   Psychiatric/Behavioral:  The patient does not have insomnia.        See HPI for details    All Other ROS: Negative except as stated in HPI.       Objective:     BP (!) 158/77   Pulse (!) 56   Temp 98.1 °F (36.7 °C) (Tympanic)   Resp 18   Ht 5' 3.78" (1.62 m)   Wt 62.1 kg (137 lb)   SpO2 99%   BMI 23.68 kg/m²     Physical Exam  Vitals reviewed.   Constitutional:       General: She is not in acute distress.     Appearance: Normal appearance.   Cardiovascular:      Rate and Rhythm: Normal rate and regular rhythm.      Heart sounds: No murmur heard.     No friction rub. No gallop.   Pulmonary:      Effort: No respiratory distress.      Breath sounds: No wheezing, rhonchi or rales.   Musculoskeletal:         General: No swelling, tenderness or deformity.      Right lower leg: No edema.      Left lower leg: No edema.   Skin:     General: Skin is warm and dry.      Findings: Bruising present. No lesion or rash.   Neurological:      General: No focal deficit present.      Mental Status: She is alert.   Psychiatric:         Mood and Affect: Mood normal.         Assessment:       ICD-10-CM ICD-9-CM   1. Routine general medical examination at a health care facility  Z00.00 V70.0   2. Diabetic polyneuropathy associated with type 2 diabetes mellitus  E11.42 250.60     357.2   3. Breast cancer screening by mammogram  Z12.31 V76.12   4. Insomnia, unspecified type  G47.00 780.52        Plan:       Health Maintenance Topics with due status: Not Due       Topic Last Completion Date    Colorectal Cancer " Screening 07/02/2021    TETANUS VACCINE 06/18/2022    Influenza Vaccine 11/08/2022    Eye Exam 03/08/2023    Diabetes Urine Screening 05/24/2023    Lipid Panel 05/24/2023    Hemoglobin A1c 05/24/2023    Foot Exam 08/24/2023      Eye Exam - Recommend annually.    Dental Exam - Recommend biannual exams.     Vaccinations -   Immunization History   Administered Date(s) Administered    COVID-19, MRNA, LN-S, PF (Pfizer) (Gray Cap) 06/18/2022    COVID-19, MRNA, LN-S, PF (Pfizer) (Purple Cap) 03/20/2021, 04/10/2021, 10/02/2021    DTP 1967, 1967, 1967, 05/30/1968, 04/05/1973    Influenza - Quadrivalent - MDCK - PF 11/03/2017    Influenza - Quadrivalent - PF *Preferred* (6 months and older) 09/20/2020, 10/02/2021    Influenza A (H1N1) 2009 Monovalent - IM - PF 01/25/2010    MMR 02/07/1997    Measles 06/27/1968    Measles / Rubella 05/02/1973    OPV 1967, 1967, 1967, 05/30/1968, 04/05/1973    Pneumococcal Conjugate - 13 Valent 05/06/2022    Td - PF (ADULT) 04/05/1984, 02/16/1995, 06/05/2006    Tdap 06/18/2022    Zoster Recombinant 02/11/2020        1. Routine general medical examination at a health care facility  - CBC Auto Differential; Future  - Comprehensive Metabolic Panel; Future  - Lipid Panel; Future  - TSH; Future  - Hemoglobin A1C; Future  - T4, Free; Future    2. Diabetic polyneuropathy associated with type 2 diabetes mellitus  - gabapentin (NEURONTIN) 300 MG capsule; Take 1 capsule (300 mg total) by mouth every evening.  Dispense: 30 capsule; Refill: 11  - Hemoglobin A1C; Future    3. Breast cancer screening by mammogram  - Mammo Digital Screening Bilat w/ Jeff; Future    4. Insomnia, unspecified type  - mirtazapine (REMERON) 7.5 MG Tab; Take 1 tablet (7.5 mg total) by mouth every evening.  Dispense: 30 tablet; Refill: 11    Medication List with Changes/Refills   New Medications    GABAPENTIN (NEURONTIN) 300 MG CAPSULE    Take 1 capsule (300 mg total) by mouth every evening.        Start Date: 2023 End Date: 2024    MIRTAZAPINE (REMERON) 7.5 MG TAB    Take 1 tablet (7.5 mg total) by mouth every evening.       Start Date: 2023 End Date: 2024   Current Medications    ALBUTEROL (PROVENTIL/VENTOLIN HFA) 90 MCG/ACTUATION INHALER    inhale TWO puffs EVERY 4 TO 6 HOURS AS NEEDED FOR wheezing       Start Date: 3/27/2023 End Date: --    ALPRAZOLAM (XANAX) 0.5 MG TABLET    TAKE ONE TABLET BY MOUTH TWICE DAILY       Start Date: 2023 End Date: --    ATORVASTATIN (LIPITOR) 20 MG TABLET    Take 1 tablet (20 mg total) by mouth once daily.       Start Date: 2022 End Date: 2023    AZELASTINE (ASTELIN) 137 MCG (0.1 %) NASAL SPRAY    SMARTSI-2 Spray(s) Both Nares Twice Daily       Start Date: 10/7/2022 End Date: --    CITALOPRAM (CELEXA) 20 MG TABLET    TAKE ONE TABLET BY MOUTH DAILY       Start Date: 2023 End Date: --    CLOPIDOGREL (PLAVIX) 75 MG TABLET    Take 75 mg by mouth once daily.       Start Date: 2022 End Date: --    DULAGLUTIDE (TRULICITY) 1.5 MG/0.5 ML PEN INJECTOR    INJECT 0.5 ML EVERY WEEK BY SUBCUTANEOUS ROUTE.       Start Date: 2023 End Date: --    FLUTICASONE PROPIONATE (FLONASE) 50 MCG/ACTUATION NASAL SPRAY    USE 1 SPRAY IN EACH NOSTRIL DAILY       Start Date: 2023  End Date: --    GLIMEPIRIDE (AMARYL) 2 MG TABLET    Take 1 tablet (2 mg total) by mouth 2 (two) times a day.       Start Date: 10/25/2022End Date: 10/25/2023    HYDROCORTISONE 2.5 % CREAM    SMARTSIG:sparingly Topical 3 Times Daily       Start Date: 2023 End Date: --    HYDROXYZINE PAMOATE (VISTARIL) 25 MG CAP    TAKE ONE CAPSULE BY MOUTH DAILY       Start Date: 2023 End Date: --     MG TABLET    TAKE ONE TABLET BY MOUTH THREE TIMES DAILY WITH FOOD AS NEEDED FOR PAIN       Start Date: 2023 End Date: --    LINAGLIPTIN (TRADJENTA) 5 MG TAB TABLET    TAKE 1 TABLET BY MOUTH DAILY       Start Date: 2023 End Date: --    LOSARTAN (COZAAR) 100 MG TABLET     TAKE 1 TABLET BY MOUTH DAILY FOR BLOOD PRESSURE       Start Date: 6/26/2023 End Date: --    NIFEDIPINE (PROCARDIA-XL) 60 MG (OSM) 24 HR TABLET    TAKE ONE TABLET BY MOUTH DAILY FOR BLOOD PRESSURE       Start Date: 5/18/2023 End Date: --    OMEPRAZOLE (PRILOSEC) 40 MG CAPSULE    TAKE ONE CAPSULE BY MOUTH DAILY for stomach       Start Date: 2/22/2023 End Date: --    SPIRIVA WITH HANDIHALER 18 MCG INHALATION CAPSULE    Inhale 1 capsule into the lungs Daily.       Start Date: 7/20/2023 End Date: --    SUCRALFATE (CARAFATE) 1 GRAM TABLET    sucralfate 1 gram tablet       Start Date: --        End Date: --    SYMBICORT 160-4.5 MCG/ACTUATION HFAA    INHALE 2 PUFFS TWICE A DAY       Start Date: 7/31/2023 End Date: --   Discontinued Medications    GABAPENTIN (NEURONTIN) 600 MG TABLET    TAKE 1 TABLET BY MOUTH FOUR TIMES DAILY       Start Date: 5/23/2023 End Date: 8/24/2023      The patient's Health Maintenance was reviewed and the following appears to be due at this time:   Health Maintenance Due   Topic Date Due    Cervical Cancer Screening  Never done    LDCT Lung Screen  Never done    Shingles Vaccine (2 of 2) 04/07/2020    Pneumococcal Vaccines (Age 0-64) (2 - PPSV23 or PCV20) 07/01/2022    COVID-19 Vaccine (5 - Pfizer series) 08/13/2022    Mammogram  10/19/2023       Follow up in about 3 months (around 11/24/2023) for Follow up. In addition to their scheduled follow up, the patient has also been instructed to follow up on as needed basis.

## 2023-08-25 ENCOUNTER — LAB VISIT (OUTPATIENT)
Dept: LAB | Facility: HOSPITAL | Age: 58
End: 2023-08-25
Attending: FAMILY MEDICINE
Payer: MEDICAID

## 2023-08-25 DIAGNOSIS — E11.42 DIABETIC POLYNEUROPATHY ASSOCIATED WITH TYPE 2 DIABETES MELLITUS: ICD-10-CM

## 2023-08-25 DIAGNOSIS — Z00.00 ROUTINE GENERAL MEDICAL EXAMINATION AT A HEALTH CARE FACILITY: ICD-10-CM

## 2023-08-25 LAB
ALBUMIN SERPL-MCNC: 3.7 G/DL (ref 3.5–5)
ALBUMIN/GLOB SERPL: 1.5 RATIO (ref 1.1–2)
ALP SERPL-CCNC: 72 UNIT/L (ref 40–150)
ALT SERPL-CCNC: 13 UNIT/L (ref 0–55)
AST SERPL-CCNC: 14 UNIT/L (ref 5–34)
BASOPHILS # BLD AUTO: 0.03 X10(3)/MCL
BASOPHILS NFR BLD AUTO: 0.7 %
BILIRUB SERPL-MCNC: 0.5 MG/DL
BUN SERPL-MCNC: 8 MG/DL (ref 9.8–20.1)
CALCIUM SERPL-MCNC: 9.4 MG/DL (ref 8.4–10.2)
CHLORIDE SERPL-SCNC: 107 MMOL/L (ref 98–107)
CHOLEST SERPL-MCNC: 165 MG/DL
CHOLEST/HDLC SERPL: 4 {RATIO} (ref 0–5)
CO2 SERPL-SCNC: 29 MMOL/L (ref 22–29)
CREAT SERPL-MCNC: 0.83 MG/DL (ref 0.55–1.02)
EOSINOPHIL # BLD AUTO: 0.02 X10(3)/MCL (ref 0–0.9)
EOSINOPHIL NFR BLD AUTO: 0.5 %
ERYTHROCYTE [DISTWIDTH] IN BLOOD BY AUTOMATED COUNT: 13.2 % (ref 11.5–17)
EST. AVERAGE GLUCOSE BLD GHB EST-MCNC: 145.6 MG/DL
GFR SERPLBLD CREATININE-BSD FMLA CKD-EPI: >60 MLS/MIN/1.73/M2
GLOBULIN SER-MCNC: 2.5 GM/DL (ref 2.4–3.5)
GLUCOSE SERPL-MCNC: 140 MG/DL (ref 74–100)
HBA1C MFR BLD: 6.7 %
HCT VFR BLD AUTO: 36 % (ref 37–47)
HDLC SERPL-MCNC: 41 MG/DL (ref 35–60)
HGB BLD-MCNC: 11.9 G/DL (ref 12–16)
IMM GRANULOCYTES # BLD AUTO: 0 X10(3)/MCL (ref 0–0.04)
IMM GRANULOCYTES NFR BLD AUTO: 0 %
LDLC SERPL CALC-MCNC: 83 MG/DL (ref 50–140)
LYMPHOCYTES # BLD AUTO: 1.77 X10(3)/MCL (ref 0.6–4.6)
LYMPHOCYTES NFR BLD AUTO: 43.4 %
MCH RBC QN AUTO: 27.8 PG (ref 27–31)
MCHC RBC AUTO-ENTMCNC: 33.1 G/DL (ref 33–36)
MCV RBC AUTO: 84.1 FL (ref 80–94)
MONOCYTES # BLD AUTO: 0.34 X10(3)/MCL (ref 0.1–1.3)
MONOCYTES NFR BLD AUTO: 8.3 %
NEUTROPHILS # BLD AUTO: 1.92 X10(3)/MCL (ref 2.1–9.2)
NEUTROPHILS NFR BLD AUTO: 47.1 %
NRBC BLD AUTO-RTO: 0 %
PLATELET # BLD AUTO: 206 X10(3)/MCL (ref 130–400)
PMV BLD AUTO: 10.3 FL (ref 7.4–10.4)
POTASSIUM SERPL-SCNC: 3.7 MMOL/L (ref 3.5–5.1)
PROT SERPL-MCNC: 6.2 GM/DL (ref 6.4–8.3)
RBC # BLD AUTO: 4.28 X10(6)/MCL (ref 4.2–5.4)
SODIUM SERPL-SCNC: 144 MMOL/L (ref 136–145)
T4 FREE SERPL-MCNC: 0.95 NG/DL (ref 0.7–1.48)
TRIGL SERPL-MCNC: 206 MG/DL (ref 37–140)
TSH SERPL-ACNC: 2.05 UIU/ML (ref 0.35–4.94)
VLDLC SERPL CALC-MCNC: 41 MG/DL
WBC # SPEC AUTO: 4.08 X10(3)/MCL (ref 4.5–11.5)

## 2023-08-25 PROCEDURE — 80053 COMPREHEN METABOLIC PANEL: CPT

## 2023-08-25 PROCEDURE — 84439 ASSAY OF FREE THYROXINE: CPT

## 2023-08-25 PROCEDURE — 83036 HEMOGLOBIN GLYCOSYLATED A1C: CPT

## 2023-08-25 PROCEDURE — 80061 LIPID PANEL: CPT

## 2023-08-25 PROCEDURE — 36415 COLL VENOUS BLD VENIPUNCTURE: CPT

## 2023-08-25 PROCEDURE — 84443 ASSAY THYROID STIM HORMONE: CPT

## 2023-08-25 PROCEDURE — 85025 COMPLETE CBC W/AUTO DIFF WBC: CPT

## 2023-09-01 DIAGNOSIS — E78.5 HYPERLIPIDEMIA, UNSPECIFIED HYPERLIPIDEMIA TYPE: ICD-10-CM

## 2023-09-01 DIAGNOSIS — E11.65 TYPE 2 DIABETES MELLITUS WITH HYPERGLYCEMIA, WITHOUT LONG-TERM CURRENT USE OF INSULIN: ICD-10-CM

## 2023-09-01 DIAGNOSIS — G89.29 OTHER CHRONIC PAIN: ICD-10-CM

## 2023-09-01 DIAGNOSIS — K21.9 GASTRO-ESOPHAGEAL REFLUX DISEASE WITHOUT ESOPHAGITIS: ICD-10-CM

## 2023-09-01 DIAGNOSIS — L29.9 PRURITUS: ICD-10-CM

## 2023-09-05 RX ORDER — HYDROXYZINE PAMOATE 25 MG/1
CAPSULE ORAL
Qty: 30 CAPSULE | Refills: 1 | Status: SHIPPED | OUTPATIENT
Start: 2023-09-05 | End: 2023-11-29

## 2023-09-05 RX ORDER — GLIMEPIRIDE 2 MG/1
2 TABLET ORAL 2 TIMES DAILY
Qty: 180 TABLET | Refills: 3 | Status: SHIPPED | OUTPATIENT
Start: 2023-09-05

## 2023-09-05 RX ORDER — ATORVASTATIN CALCIUM 20 MG/1
20 TABLET, FILM COATED ORAL
Qty: 90 TABLET | Refills: 3 | Status: SHIPPED | OUTPATIENT
Start: 2023-09-05 | End: 2023-11-27

## 2023-09-05 RX ORDER — IBUPROFEN 800 MG/1
TABLET ORAL
Qty: 90 TABLET | Refills: 1 | Status: SHIPPED | OUTPATIENT
Start: 2023-09-05 | End: 2023-11-29

## 2023-09-05 RX ORDER — OMEPRAZOLE 40 MG/1
CAPSULE, DELAYED RELEASE ORAL
Qty: 30 CAPSULE | Refills: 5 | Status: SHIPPED | OUTPATIENT
Start: 2023-09-05

## 2023-09-21 ENCOUNTER — TELEPHONE (OUTPATIENT)
Dept: FAMILY MEDICINE | Facility: CLINIC | Age: 58
End: 2023-09-21
Payer: MEDICAID

## 2023-09-21 NOTE — TELEPHONE ENCOUNTER
----- Message from Chriss Jean Baptiste NP sent at 9/16/2023  3:37 PM CDT -----  Please inform patient of lab results.     1. BUN  and protein level are both low.  Is patient's diet low in protein?     2. Triglycerides are elevated.  The goal is for triglycerides to be less than 140.    Is patient compliant with medication?  Please just diet modifications and exercise.    3. All other labs are within normal ranges.    Thanks for all you do,   Chriss

## 2023-09-21 NOTE — TELEPHONE ENCOUNTER
Notified pt, she is compliant with her medications. Saw Dr Kolb today and has testing scheduled and another f/u visit within next couple weeks. She will discuss elevated triglycerides with Dr Kolb and see if he wants to address. If not, she will call us back and let us know.

## 2023-09-26 ENCOUNTER — OFFICE VISIT (OUTPATIENT)
Dept: FAMILY MEDICINE | Facility: CLINIC | Age: 58
End: 2023-09-26
Payer: MEDICAID

## 2023-09-26 VITALS
WEIGHT: 137 LBS | BODY MASS INDEX: 23.39 KG/M2 | DIASTOLIC BLOOD PRESSURE: 65 MMHG | HEIGHT: 64 IN | RESPIRATION RATE: 18 BRPM | TEMPERATURE: 98 F | OXYGEN SATURATION: 96 % | HEART RATE: 97 BPM | SYSTOLIC BLOOD PRESSURE: 117 MMHG

## 2023-09-26 DIAGNOSIS — J02.9 ACUTE PHARYNGITIS, UNSPECIFIED ETIOLOGY: Primary | ICD-10-CM

## 2023-09-26 PROCEDURE — 3044F HG A1C LEVEL LT 7.0%: CPT | Mod: CPTII,,, | Performed by: FAMILY MEDICINE

## 2023-09-26 PROCEDURE — 2023F DILAT RTA XM W/O RTNOPTHY: CPT | Mod: CPTII,,, | Performed by: FAMILY MEDICINE

## 2023-09-26 PROCEDURE — 3074F PR MOST RECENT SYSTOLIC BLOOD PRESSURE < 130 MM HG: ICD-10-PCS | Mod: CPTII,,, | Performed by: FAMILY MEDICINE

## 2023-09-26 PROCEDURE — 3008F PR BODY MASS INDEX (BMI) DOCUMENTED: ICD-10-PCS | Mod: CPTII,,, | Performed by: FAMILY MEDICINE

## 2023-09-26 PROCEDURE — 3061F PR NEG MICROALBUMINURIA RESULT DOCUMENTED/REVIEW: ICD-10-PCS | Mod: CPTII,,, | Performed by: FAMILY MEDICINE

## 2023-09-26 PROCEDURE — 3078F PR MOST RECENT DIASTOLIC BLOOD PRESSURE < 80 MM HG: ICD-10-PCS | Mod: CPTII,,, | Performed by: FAMILY MEDICINE

## 2023-09-26 PROCEDURE — 3066F PR DOCUMENTATION OF TREATMENT FOR NEPHROPATHY: ICD-10-PCS | Mod: CPTII,,, | Performed by: FAMILY MEDICINE

## 2023-09-26 PROCEDURE — 99213 OFFICE O/P EST LOW 20 MIN: CPT | Mod: ,,, | Performed by: FAMILY MEDICINE

## 2023-09-26 PROCEDURE — 3074F SYST BP LT 130 MM HG: CPT | Mod: CPTII,,, | Performed by: FAMILY MEDICINE

## 2023-09-26 PROCEDURE — 3044F PR MOST RECENT HEMOGLOBIN A1C LEVEL <7.0%: ICD-10-PCS | Mod: CPTII,,, | Performed by: FAMILY MEDICINE

## 2023-09-26 PROCEDURE — 4010F PR ACE/ARB THEARPY RXD/TAKEN: ICD-10-PCS | Mod: CPTII,,, | Performed by: FAMILY MEDICINE

## 2023-09-26 PROCEDURE — 3061F NEG MICROALBUMINURIA REV: CPT | Mod: CPTII,,, | Performed by: FAMILY MEDICINE

## 2023-09-26 PROCEDURE — 3066F NEPHROPATHY DOC TX: CPT | Mod: CPTII,,, | Performed by: FAMILY MEDICINE

## 2023-09-26 PROCEDURE — 1160F PR REVIEW ALL MEDS BY PRESCRIBER/CLIN PHARMACIST DOCUMENTED: ICD-10-PCS | Mod: CPTII,,, | Performed by: FAMILY MEDICINE

## 2023-09-26 PROCEDURE — 4010F ACE/ARB THERAPY RXD/TAKEN: CPT | Mod: CPTII,,, | Performed by: FAMILY MEDICINE

## 2023-09-26 PROCEDURE — 3078F DIAST BP <80 MM HG: CPT | Mod: CPTII,,, | Performed by: FAMILY MEDICINE

## 2023-09-26 PROCEDURE — 2023F PR DILATED RETINAL EXAM W/O EVID OF RETINOPATHY: ICD-10-PCS | Mod: CPTII,,, | Performed by: FAMILY MEDICINE

## 2023-09-26 PROCEDURE — 3008F BODY MASS INDEX DOCD: CPT | Mod: CPTII,,, | Performed by: FAMILY MEDICINE

## 2023-09-26 PROCEDURE — 1159F MED LIST DOCD IN RCRD: CPT | Mod: CPTII,,, | Performed by: FAMILY MEDICINE

## 2023-09-26 PROCEDURE — 1159F PR MEDICATION LIST DOCUMENTED IN MEDICAL RECORD: ICD-10-PCS | Mod: CPTII,,, | Performed by: FAMILY MEDICINE

## 2023-09-26 PROCEDURE — 99213 PR OFFICE/OUTPT VISIT, EST, LEVL III, 20-29 MIN: ICD-10-PCS | Mod: ,,, | Performed by: FAMILY MEDICINE

## 2023-09-26 PROCEDURE — 1160F RVW MEDS BY RX/DR IN RCRD: CPT | Mod: CPTII,,, | Performed by: FAMILY MEDICINE

## 2023-09-26 RX ORDER — BENZONATATE 200 MG/1
200 CAPSULE ORAL 3 TIMES DAILY PRN
Qty: 30 CAPSULE | Refills: 0 | Status: SHIPPED | OUTPATIENT
Start: 2023-09-26 | End: 2023-10-06

## 2023-09-26 RX ORDER — GUAIFENESIN 600 MG/1
1200 TABLET, EXTENDED RELEASE ORAL 2 TIMES DAILY
Qty: 20 TABLET | Refills: 1 | Status: SHIPPED | OUTPATIENT
Start: 2023-09-26 | End: 2023-10-06

## 2023-09-26 NOTE — PROGRESS NOTES
Patient ID: [id number]     Chief Complaint: Nasal Congestion (Congestion/nasal fullness, bloody sinuses x1 week, negative home COVID test today, no fever)        HPI:     Jocelyne Dickinson is a 58 y.o. female here today for Nasal Congestion (Congestion/nasal fullness, bloody sinuses x1 week, negative home COVID test today, no fever) No other complaints today.     ----------------------------  Anxiety disorder, unspecified  Carotid artery stenosis  Cellulitis of scalp  COPD (chronic obstructive pulmonary disease)  Diabetes mellitus  Emphysema, unspecified  HTN (hypertension)  Mixed hyperlipidemia  Neuropathy  Osteoarthritis  Personal history of nicotine dependence  Pneumonia, unspecified organism  Sleep apnea, unspecified  Syncope     Past Surgical History:   Procedure Laterality Date    CATARACT EXTRACTION      COLONOSCOPY  07/02/2021    Dr. Nila Sharma    REPAIR OF CAROTID ARTERY      TONSILLECTOMY         Review of patient's allergies indicates:   Allergen Reactions    Aller ext-american cockroach Itching and Other (See Comments)    Allerg ext-tree poll-red maple Itching and Other (See Comments)    Cat hair standardized allergenic extract Itching and Other (See Comments)    Dog hair standardized allergenic extract Itching and Other (See Comments)    Grass pollen-ruslan, standard Itching and Other (See Comments)    Horse dander Itching and Other (See Comments)    Tree pollen-box elder Itching and Other (See Comments)    Tree pollen-pecan Itching and Other (See Comments)       Outpatient Medications Marked as Taking for the 9/26/23 encounter (Office Visit) with Edi Roldan,    Medication Sig Dispense Refill    albuterol (PROVENTIL/VENTOLIN HFA) 90 mcg/actuation inhaler inhale TWO puffs EVERY 4 TO 6 HOURS AS NEEDED FOR wheezing 6.7 g 4    ALPRAZolam (XANAX) 0.5 MG tablet TAKE ONE TABLET BY MOUTH TWICE DAILY 60 tablet 4    atorvastatin (LIPITOR) 20 MG tablet TAKE ONE TABLET BY MOUTH DAILY for cholesterol 90  tablet 3    azelastine (ASTELIN) 137 mcg (0.1 %) nasal spray SMARTSI-2 Spray(s) Both Nares Twice Daily      citalopram (CELEXA) 20 MG tablet TAKE ONE TABLET BY MOUTH DAILY 30 tablet 5    clopidogreL (PLAVIX) 75 mg tablet Take 75 mg by mouth once daily.      dulaglutide (TRULICITY) 1.5 mg/0.5 mL pen injector INJECT 0.5 ML EVERY WEEK BY SUBCUTANEOUS ROUTE. 4 pen 6    fluticasone propionate (FLONASE) 50 mcg/actuation nasal spray USE 1 SPRAY IN EACH NOSTRIL DAILY 16 g 4    gabapentin (NEURONTIN) 300 MG capsule Take 1 capsule (300 mg total) by mouth every evening. 30 capsule 11    glimepiride (AMARYL) 2 MG tablet TAKE ONE TABLET BY MOUTH TWICE DAILY FOR DIABETES 180 tablet 3    hydrocortisone 2.5 % cream SMARTSIG:sparingly Topical 3 Times Daily      hydrOXYzine pamoate (VISTARIL) 25 MG Cap TAKE ONE CAPSULE BY MOUTH DAILY 30 capsule 1     mg tablet TAKE ONE TABLET BY MOUTH THREE TIMES DAILY WITH FOOD AS NEEDED FOR PAIN 90 tablet 1    linaGLIPtin (TRADJENTA) 5 mg Tab tablet TAKE 1 TABLET BY MOUTH DAILY 30 tablet 5    losartan (COZAAR) 100 MG tablet TAKE 1 TABLET BY MOUTH DAILY FOR BLOOD PRESSURE 30 tablet 5    mirtazapine (REMERON) 7.5 MG Tab Take 1 tablet (7.5 mg total) by mouth every evening. 30 tablet 11    NIFEdipine (PROCARDIA-XL) 60 MG (OSM) 24 hr tablet TAKE ONE TABLET BY MOUTH DAILY FOR BLOOD PRESSURE 30 tablet 5    omeprazole (PRILOSEC) 40 MG capsule TAKE ONE CAPSULE BY MOUTH DAILY for stomach 30 capsule 5    SPIRIVA WITH HANDIHALER 18 mcg inhalation capsule Inhale 1 capsule into the lungs Daily.      sucralfate (CARAFATE) 1 gram tablet sucralfate 1 gram tablet      SYMBICORT 160-4.5 mcg/actuation HFAA INHALE 2 PUFFS TWICE A DAY 10.2 g 6       Social History     Socioeconomic History    Marital status: Single   Tobacco Use    Smoking status: Every Day     Current packs/day: 0.00     Average packs/day: 1.5 packs/day for 40.0 years (60.0 ttl pk-yrs)     Types: Vaping with nicotine, Cigarettes     Start  date:      Last attempt to quit: 2016     Years since quittin.7    Smokeless tobacco: Never   Substance and Sexual Activity    Alcohol use: Not Currently    Drug use: Never    Sexual activity: Not Currently     Social Determinants of Health     Financial Resource Strain: Low Risk  (2023)    Overall Financial Resource Strain (CARDIA)     Difficulty of Paying Living Expenses: Not hard at all   Food Insecurity: No Food Insecurity (2023)    Hunger Vital Sign     Worried About Running Out of Food in the Last Year: Never true     Ran Out of Food in the Last Year: Never true   Transportation Needs: No Transportation Needs (2023)    PRAPARE - Transportation     Lack of Transportation (Medical): No     Lack of Transportation (Non-Medical): No   Physical Activity: Sufficiently Active (2023)    Exercise Vital Sign     Days of Exercise per Week: 7 days     Minutes of Exercise per Session: 30 min   Stress: Stress Concern Present (2023)    [employer] - Occupational Stress Questionnaire     Feeling of Stress : To some extent   Social Connections: Socially Integrated (2023)    Social Connection and Isolation Panel [NHANES]     Frequency of Communication with Friends and Family: More than three times a week     Frequency of Social Gatherings with Friends and Family: More than three times a week     Attends Gnosticist Services: More than 4 times per year     Active Member of Clubs or Organizations: Yes     Attends Club or Organization Meetings: More than 4 times per year     Marital Status:    Housing Stability: Low Risk  (2023)    Housing Stability Vital Sign     Unable to Pay for Housing in the Last Year: No     Number of Places Lived in the Last Year: 1     Unstable Housing in the Last Year: No        Family History   Problem Relation Age of Onset    Hypertension Mother     Hypertension Father     Heart disease Father     Cancer Father     Stroke Father  "    Cancer Maternal Grandmother     Vision loss Maternal Grandmother         Patient Care Team:  Edi Roldan DO as PCP - General (Family Medicine)  Drew Gardiner MD as Consulting Physician (Otolaryngology)  Ricky Foster MD as Consulting Physician (Pulmonary Disease)  Solomon Kolb MD as Consulting Physician (Cardiology)     Subjective:     Review of Systems   Constitutional:  Positive for malaise/fatigue. Negative for chills and fever.   HENT:  Positive for congestion, nosebleeds and sore throat.    Respiratory:  Positive for cough. Negative for sputum production, shortness of breath and wheezing.    Cardiovascular:  Negative for chest pain and palpitations.   Gastrointestinal:  Negative for constipation and diarrhea.   Neurological:  Negative for headaches.   Psychiatric/Behavioral:  The patient does not have insomnia.        See HPI for details  All Other ROS: Negative except as stated in HPI.       Objective:     /65   Pulse 97   Temp 98 °F (36.7 °C) (Oral)   Resp 18   Ht 5' 3.78" (1.62 m)   Wt 62.1 kg (137 lb)   SpO2 96%   BMI 23.68 kg/m²     Physical Exam  Vitals reviewed.   Constitutional:       General: She is not in acute distress.     Appearance: Normal appearance.   HENT:      Right Ear: Tympanic membrane, ear canal and external ear normal. There is no impacted cerumen.      Left Ear: Tympanic membrane, ear canal and external ear normal. There is no impacted cerumen.      Mouth/Throat:      Mouth: Mucous membranes are moist.      Pharynx: No oropharyngeal exudate or posterior oropharyngeal erythema.   Cardiovascular:      Rate and Rhythm: Normal rate and regular rhythm.      Heart sounds: No murmur heard.     No friction rub. No gallop.   Pulmonary:      Effort: No respiratory distress.      Breath sounds: No wheezing, rhonchi or rales.   Musculoskeletal:         General: No swelling, tenderness or deformity.      Right lower leg: No edema.      Left lower leg: No edema. "   Lymphadenopathy:      Cervical: Cervical adenopathy present.   Skin:     General: Skin is warm and dry.      Findings: No lesion or rash.   Neurological:      General: No focal deficit present.      Mental Status: She is alert.   Psychiatric:         Mood and Affect: Mood normal.         Assessment/Plan:     1. Acute pharyngitis, unspecified etiology  -     benzonatate (TESSALON) 200 MG capsule; Take 1 capsule (200 mg total) by mouth 3 (three) times daily as needed for Cough.  Dispense: 30 capsule; Refill: 0  -     guaiFENesin (MUCINEX) 600 mg 12 hr tablet; Take 2 tablets (1,200 mg total) by mouth 2 (two) times daily. for 10 days  Dispense: 20 tablet; Refill: 1      Follow up:     Follow up if symptoms worsen or fail to improve. In addition to their scheduled follow up, the patient has also been instructed to follow up on as needed basis.

## 2023-09-27 DIAGNOSIS — R06.09 OTHER FORMS OF DYSPNEA: ICD-10-CM

## 2023-09-27 DIAGNOSIS — J44.9 CHRONIC OBSTRUCTIVE PULMONARY DISEASE, UNSPECIFIED: Primary | ICD-10-CM

## 2023-09-27 DIAGNOSIS — J43.9 EMPHYSEMA, UNSPECIFIED: ICD-10-CM

## 2023-10-03 RX ORDER — ALPRAZOLAM 0.5 MG/1
0.5 TABLET ORAL 2 TIMES DAILY
Qty: 60 TABLET | Refills: 5 | Status: SHIPPED | OUTPATIENT
Start: 2023-10-03

## 2023-10-09 ENCOUNTER — HOSPITAL ENCOUNTER (OUTPATIENT)
Dept: CARDIOLOGY | Facility: HOSPITAL | Age: 58
Discharge: HOME | End: 2023-10-09
Attending: INTERNAL MEDICINE
Payer: MEDICAID

## 2023-10-09 DIAGNOSIS — J44.9 CHRONIC OBSTRUCTIVE PULMONARY DISEASE, UNSPECIFIED: ICD-10-CM

## 2023-10-09 DIAGNOSIS — R06.09 OTHER FORMS OF DYSPNEA: ICD-10-CM

## 2023-10-09 DIAGNOSIS — J43.9 EMPHYSEMA, UNSPECIFIED: ICD-10-CM

## 2023-10-09 LAB
OHS CV CPX 85 PERCENT MAX PREDICTED HEART RATE MALE: 133
OHS CV CPX MAX PREDICTED HEART RATE: 157
OHS CV CPX PATIENT IS FEMALE: 1
OHS CV CPX PATIENT IS MALE: 0

## 2023-10-09 PROCEDURE — 93351 STRESS TTE COMPLETE: CPT

## 2023-10-09 RX ORDER — DOBUTAMINE HYDROCHLORIDE 400 MG/100ML
INJECTION INTRAVENOUS
Status: DISCONTINUED
Start: 2023-10-09 | End: 2023-10-10 | Stop reason: HOSPADM

## 2023-10-17 ENCOUNTER — PATIENT MESSAGE (OUTPATIENT)
Dept: ADMINISTRATIVE | Facility: HOSPITAL | Age: 58
End: 2023-10-17
Payer: MEDICAID

## 2023-10-23 ENCOUNTER — HOSPITAL ENCOUNTER (OUTPATIENT)
Dept: RADIOLOGY | Facility: HOSPITAL | Age: 58
Discharge: HOME | End: 2023-10-23
Attending: FAMILY MEDICINE
Payer: MEDICAID

## 2023-10-23 DIAGNOSIS — Z12.31 BREAST CANCER SCREENING BY MAMMOGRAM: ICD-10-CM

## 2023-10-23 PROCEDURE — 77067 MAMMO DIGITAL SCREENING BILAT WITH TOMO: ICD-10-PCS | Mod: 26,,, | Performed by: RADIOLOGY

## 2023-10-23 PROCEDURE — 77063 BREAST TOMOSYNTHESIS BI: CPT | Mod: 26,,, | Performed by: RADIOLOGY

## 2023-10-23 PROCEDURE — 77067 SCR MAMMO BI INCL CAD: CPT | Mod: TC

## 2023-10-23 PROCEDURE — 77067 SCR MAMMO BI INCL CAD: CPT | Mod: 26,,, | Performed by: RADIOLOGY

## 2023-10-23 PROCEDURE — 77063 MAMMO DIGITAL SCREENING BILAT WITH TOMO: ICD-10-PCS | Mod: 26,,, | Performed by: RADIOLOGY

## 2023-11-02 DIAGNOSIS — E11.65 TYPE 2 DIABETES MELLITUS WITH HYPERGLYCEMIA, WITHOUT LONG-TERM CURRENT USE OF INSULIN: ICD-10-CM

## 2023-11-02 DIAGNOSIS — J44.0 CHRONIC OBSTRUCTIVE PULMONARY DISEASE WITH (ACUTE) LOWER RESPIRATORY INFECTION: ICD-10-CM

## 2023-11-02 RX ORDER — ALBUTEROL SULFATE 90 UG/1
AEROSOL, METERED RESPIRATORY (INHALATION)
Qty: 6.7 G | Refills: 6 | Status: SHIPPED | OUTPATIENT
Start: 2023-11-02

## 2023-11-02 RX ORDER — DULAGLUTIDE 1.5 MG/.5ML
INJECTION, SOLUTION SUBCUTANEOUS
Qty: 4 PEN | Refills: 6 | Status: SHIPPED | OUTPATIENT
Start: 2023-11-02 | End: 2023-12-27

## 2023-11-27 ENCOUNTER — OFFICE VISIT (OUTPATIENT)
Dept: FAMILY MEDICINE | Facility: CLINIC | Age: 58
End: 2023-11-27
Payer: MEDICAID

## 2023-11-27 ENCOUNTER — DOCUMENTATION ONLY (OUTPATIENT)
Dept: FAMILY MEDICINE | Facility: CLINIC | Age: 58
End: 2023-11-27

## 2023-11-27 VITALS
DIASTOLIC BLOOD PRESSURE: 75 MMHG | HEART RATE: 74 BPM | HEIGHT: 64 IN | WEIGHT: 130.19 LBS | RESPIRATION RATE: 20 BRPM | BODY MASS INDEX: 22.23 KG/M2 | SYSTOLIC BLOOD PRESSURE: 120 MMHG | OXYGEN SATURATION: 98 % | TEMPERATURE: 96 F

## 2023-11-27 DIAGNOSIS — J32.9 CHRONIC SINUSITIS, UNSPECIFIED LOCATION: ICD-10-CM

## 2023-11-27 DIAGNOSIS — E78.5 HYPERLIPIDEMIA, UNSPECIFIED HYPERLIPIDEMIA TYPE: ICD-10-CM

## 2023-11-27 DIAGNOSIS — E11.65 TYPE 2 DIABETES MELLITUS WITH HYPERGLYCEMIA, WITHOUT LONG-TERM CURRENT USE OF INSULIN: Primary | ICD-10-CM

## 2023-11-27 PROCEDURE — 99214 OFFICE O/P EST MOD 30 MIN: CPT | Mod: ,,,

## 2023-11-27 PROCEDURE — 3061F NEG MICROALBUMINURIA REV: CPT | Mod: CPTII,,,

## 2023-11-27 PROCEDURE — 3044F HG A1C LEVEL LT 7.0%: CPT | Mod: CPTII,,,

## 2023-11-27 PROCEDURE — 2023F PR DILATED RETINAL EXAM W/O EVID OF RETINOPATHY: ICD-10-PCS | Mod: CPTII,,,

## 2023-11-27 PROCEDURE — 3078F DIAST BP <80 MM HG: CPT | Mod: CPTII,,,

## 2023-11-27 PROCEDURE — 4010F ACE/ARB THERAPY RXD/TAKEN: CPT | Mod: CPTII,,,

## 2023-11-27 PROCEDURE — 3066F NEPHROPATHY DOC TX: CPT | Mod: CPTII,,,

## 2023-11-27 PROCEDURE — 3074F SYST BP LT 130 MM HG: CPT | Mod: CPTII,,,

## 2023-11-27 PROCEDURE — 1159F PR MEDICATION LIST DOCUMENTED IN MEDICAL RECORD: ICD-10-PCS | Mod: CPTII,,,

## 2023-11-27 PROCEDURE — 3066F PR DOCUMENTATION OF TREATMENT FOR NEPHROPATHY: ICD-10-PCS | Mod: CPTII,,,

## 2023-11-27 PROCEDURE — 3074F PR MOST RECENT SYSTOLIC BLOOD PRESSURE < 130 MM HG: ICD-10-PCS | Mod: CPTII,,,

## 2023-11-27 PROCEDURE — 99214 PR OFFICE/OUTPT VISIT, EST, LEVL IV, 30-39 MIN: ICD-10-PCS | Mod: ,,,

## 2023-11-27 PROCEDURE — 3061F PR NEG MICROALBUMINURIA RESULT DOCUMENTED/REVIEW: ICD-10-PCS | Mod: CPTII,,,

## 2023-11-27 PROCEDURE — 2023F DILAT RTA XM W/O RTNOPTHY: CPT | Mod: CPTII,,,

## 2023-11-27 PROCEDURE — 1159F MED LIST DOCD IN RCRD: CPT | Mod: CPTII,,,

## 2023-11-27 PROCEDURE — 4010F PR ACE/ARB THEARPY RXD/TAKEN: ICD-10-PCS | Mod: CPTII,,,

## 2023-11-27 PROCEDURE — 3008F PR BODY MASS INDEX (BMI) DOCUMENTED: ICD-10-PCS | Mod: CPTII,,,

## 2023-11-27 PROCEDURE — 3008F BODY MASS INDEX DOCD: CPT | Mod: CPTII,,,

## 2023-11-27 PROCEDURE — 3044F PR MOST RECENT HEMOGLOBIN A1C LEVEL <7.0%: ICD-10-PCS | Mod: CPTII,,,

## 2023-11-27 PROCEDURE — 1160F RVW MEDS BY RX/DR IN RCRD: CPT | Mod: CPTII,,,

## 2023-11-27 PROCEDURE — 3078F PR MOST RECENT DIASTOLIC BLOOD PRESSURE < 80 MM HG: ICD-10-PCS | Mod: CPTII,,,

## 2023-11-27 PROCEDURE — 1160F PR REVIEW ALL MEDS BY PRESCRIBER/CLIN PHARMACIST DOCUMENTED: ICD-10-PCS | Mod: CPTII,,,

## 2023-11-27 RX ORDER — AMOXICILLIN AND CLAVULANATE POTASSIUM 875; 125 MG/1; MG/1
1 TABLET, FILM COATED ORAL EVERY 12 HOURS
Qty: 14 TABLET | Refills: 0 | Status: SHIPPED | OUTPATIENT
Start: 2023-11-27 | End: 2023-12-04

## 2023-11-27 RX ORDER — METHYLPREDNISOLONE 4 MG/1
TABLET ORAL
Qty: 21 EACH | Refills: 0 | Status: SHIPPED | OUTPATIENT
Start: 2023-11-27 | End: 2024-02-27

## 2023-11-27 RX ORDER — ATORVASTATIN CALCIUM 40 MG/1
40 TABLET, FILM COATED ORAL DAILY
COMMUNITY

## 2023-11-27 NOTE — ASSESSMENT & PLAN NOTE
Patient was recently switched to atorvastatin 40 mg in October by Cardiology.   Recheck lipid panel today.

## 2023-11-27 NOTE — ASSESSMENT & PLAN NOTE
Start Augmentin 875mg BID x 7 days + Medrol Dose Pack .   Avoid Irritants and allergens.  Wash hands frequently to prevent common colds.  Drink plenty of fluids to thin mucous and/or use humidifier.    Consider NeilMed Saline Sinus Rinse BID.  Apply warm compress for sinus pain.  Use OTC decongestants as needed (HTN patients to avoid sudafed products).

## 2023-11-27 NOTE — ASSESSMENT & PLAN NOTE
Lab Results   Component Value Date    HGBA1C 6.7 08/25/2023    HGBA1C 7.2 (H) 05/24/2023    LDL 83.00 08/25/2023    CREATININE 0.83 08/25/2023    CREATININE 0.90 04/21/2021      Continue glimepiride 2 mg BID + Trulicity 0.5 ml weekly.   On ACE and Statin according to guidelines.  Follow ADA Diet. Avoid soda, simple sweets, and limit rice/pasta/breads/starches (no more than 45-50 grams per meal).  Maintain healthy weight with goal BMI <30.  Exercise 5 times per week for 30 minutes per day.  Stressed importance of daily foot exams.  Stressed importance of annual dilated eye exam.

## 2023-11-27 NOTE — PROGRESS NOTES
Patient ID: [id number]     Chief Complaint: Follow-up (3 month check up- labs were done after last visit in August), Diabetes, and Sinusitis (Chronic sinusitis)      HPI:   Jocelyne Dickinson is a 58 year old female who presents for follow up of diabetes. Current symptoms include: increase appetite and polydipsia. Patient denies weight loss. Evaluation to date has been: hemoglobin A1C. Home sugars: patient does not check sugars. Current treatments: no recent interventions. Last dilated eye exam 3/8/23.      Patient is also here for sinus congestion, post nasal drip, and sore throat x 5 days. Patient has been taking Sinex OTC with no relief. She denies any nasal discharge. Denies any fevers or chills. She is a patient of Dr. Gardiner.     Past Medical History:   Diagnosis Date    Anxiety disorder, unspecified     Carotid artery stenosis     Cellulitis of scalp     COPD (chronic obstructive pulmonary disease)     Diabetes mellitus     Emphysema, unspecified     HTN (hypertension)     Mixed hyperlipidemia     Neuropathy     Osteoarthritis     Personal history of nicotine dependence     Pneumonia, unspecified organism     Sleep apnea, unspecified     Syncope         Past Surgical History:   Procedure Laterality Date    CATARACT EXTRACTION      COLONOSCOPY  2021    Dr. Nila Sharma    REPAIR OF CAROTID ARTERY      TONSILLECTOMY          Social History     Socioeconomic History    Marital status: Single   Tobacco Use    Smoking status: Every Day     Current packs/day: 0.00     Average packs/day: 1.5 packs/day for 40.0 years (60.0 ttl pk-yrs)     Types: Vaping with nicotine, Cigarettes     Start date:      Last attempt to quit: 2016     Years since quittin.9    Smokeless tobacco: Never   Substance and Sexual Activity    Alcohol use: Not Currently    Drug use: Never    Sexual activity: Not Currently     Social Determinants of Health     Financial Resource Strain: Low Risk  (2023)    Overall Financial  Resource Strain (CARDIA)     Difficulty of Paying Living Expenses: Not hard at all   Food Insecurity: No Food Insecurity (2023)    Hunger Vital Sign     Worried About Running Out of Food in the Last Year: Never true     Ran Out of Food in the Last Year: Never true   Transportation Needs: No Transportation Needs (2023)    PRAPARE - Transportation     Lack of Transportation (Medical): No     Lack of Transportation (Non-Medical): No   Physical Activity: Sufficiently Active (2023)    Exercise Vital Sign     Days of Exercise per Week: 7 days     Minutes of Exercise per Session: 30 min   Stress: Stress Concern Present (2023)    [employer] - Occupational Stress Questionnaire     Feeling of Stress : To some extent   Social Connections: Socially Integrated (2023)    Social Connection and Isolation Panel [NHANES]     Frequency of Communication with Friends and Family: More than three times a week     Frequency of Social Gatherings with Friends and Family: More than three times a week     Attends Voodoo Services: More than 4 times per year     Active Member of Clubs or Organizations: Yes     Attends Club or Organization Meetings: More than 4 times per year     Marital Status:    Housing Stability: Low Risk  (2023)    Housing Stability Vital Sign     Unable to Pay for Housing in the Last Year: No     Number of Places Lived in the Last Year: 1     Unstable Housing in the Last Year: No        Current Outpatient Medications   Medication Instructions    albuterol (PROVENTIL/VENTOLIN HFA) 90 mcg/actuation inhaler inhale TWO puffs EVERY 4 TO 6 HOURS AS NEEDED FOR wheezing    ALPRAZolam (XANAX) 0.5 mg, Oral, 2 times daily    amoxicillin-clavulanate 875-125mg (AUGMENTIN) 875-125 mg per tablet 1 tablet, Oral, Every 12 hours    atorvastatin (LIPITOR) 40 mg, Oral    azelastine (ASTELIN) 137 mcg (0.1 %) nasal spray SMARTSI-2 Spray(s) Both Nares Twice Daily     citalopram (CELEXA) 20 MG tablet TAKE ONE TABLET BY MOUTH DAILY    clopidogreL (PLAVIX) 75 mg, Oral, Daily    dulaglutide (TRULICITY) 1.5 mg/0.5 mL pen injector INJECT 0.5 ML EVERY WEEK BY SUBCUTANEOUS ROUTE.    fluticasone propionate (FLONASE) 50 mcg/actuation nasal spray USE 1 SPRAY IN EACH NOSTRIL DAILY    gabapentin (NEURONTIN) 300 mg, Oral, Nightly    glimepiride (AMARYL) 2 mg, Oral, 2 times daily, for diabetes.    hydrocortisone 2.5 % cream SMARTSIG:sparingly Topical 3 Times Daily    hydrOXYzine pamoate (VISTARIL) 25 MG Cap TAKE ONE CAPSULE BY MOUTH DAILY     mg tablet TAKE ONE TABLET BY MOUTH THREE TIMES DAILY WITH FOOD AS NEEDED FOR PAIN    linaGLIPtin (TRADJENTA) 5 mg Tab tablet TAKE 1 TABLET BY MOUTH DAILY    losartan (COZAAR) 100 MG tablet TAKE 1 TABLET BY MOUTH DAILY FOR BLOOD PRESSURE    methylPREDNISolone (MEDROL DOSEPACK) 4 mg tablet use as directed    mirtazapine (REMERON) 7.5 mg, Oral, Nightly    NIFEdipine (PROCARDIA-XL) 60 MG (OSM) 24 hr tablet TAKE ONE TABLET BY MOUTH DAILY FOR BLOOD PRESSURE    omeprazole (PRILOSEC) 40 MG capsule TAKE ONE CAPSULE BY MOUTH DAILY for stomach    SPIRIVA WITH HANDIHALER 18 mcg inhalation capsule 1 capsule, Inhalation, Daily    sucralfate (CARAFATE) 1 gram tablet sucralfate 1 gram tablet    SYMBICORT 160-4.5 mcg/actuation HFAA INHALE 2 PUFFS TWICE A DAY       Review of patient's allergies indicates:   Allergen Reactions    Aller ext-american cockroach Itching and Other (See Comments)    Allerg ext-tree poll-red maple Itching and Other (See Comments)    Cat hair standardized allergenic extract Itching and Other (See Comments)    Dog hair standardized allergenic extract Itching and Other (See Comments)    Grass pollen-ruslan, standard Itching and Other (See Comments)    Horse dander Itching and Other (See Comments)    Tree pollen-box elder Itching and Other (See Comments)    Tree pollen-pecan Itching and Other (See Comments)        Patient Care  "Team:  Edi Roldan DO as PCP - General (Family Medicine)  Drew Gardiner MD as Consulting Physician (Otolaryngology)  Ricky Foster MD as Consulting Physician (Pulmonary Disease)  Solomon Kolb MD as Consulting Physician (Cardiology)     Subjective:     Review of Systems    12 point review of systems conducted, negative except as stated in the history of present illness. See HPI for details.    Objective:     Visit Vitals  /75 (BP Location: Right arm, Patient Position: Sitting, BP Method: Large (Automatic))   Pulse 74   Temp 96.3 °F (35.7 °C)   Resp 20   Ht 5' 4" (1.626 m)   Wt 59.1 kg (130 lb 3.2 oz)   SpO2 98%   BMI 22.35 kg/m²       Physical Exam  Vitals and nursing note reviewed.   Constitutional:       General: She is not in acute distress.     Appearance: She is not ill-appearing.   HENT:      Head: Normocephalic and atraumatic.      Nose:      Right Turbinates: Enlarged.      Left Turbinates: Enlarged.      Right Sinus: Maxillary sinus tenderness present.      Left Sinus: Maxillary sinus tenderness present.      Mouth/Throat:      Mouth: Mucous membranes are moist.      Pharynx: Oropharynx is clear. Posterior oropharyngeal erythema present.   Eyes:      General: No scleral icterus.     Extraocular Movements: Extraocular movements intact.      Conjunctiva/sclera: Conjunctivae normal.      Pupils: Pupils are equal, round, and reactive to light.   Neck:      Vascular: No carotid bruit.   Cardiovascular:      Rate and Rhythm: Normal rate and regular rhythm.      Heart sounds: No murmur heard.     No friction rub. No gallop.   Pulmonary:      Effort: Pulmonary effort is normal. No respiratory distress.      Breath sounds: Normal breath sounds. No wheezing, rhonchi or rales.   Abdominal:      General: Abdomen is flat. Bowel sounds are normal. There is no distension.      Palpations: Abdomen is soft. There is no mass.      Tenderness: There is no abdominal tenderness.   Musculoskeletal:         " General: Normal range of motion.      Cervical back: Normal range of motion and neck supple.   Skin:     General: Skin is warm and dry.   Neurological:      General: No focal deficit present.      Mental Status: She is alert.   Psychiatric:         Mood and Affect: Mood normal.         Labs Reviewed:     Chemistry:  Lab Results   Component Value Date     08/25/2023    K 3.7 08/25/2023    CHLORIDE 107 08/25/2023    BUN 8.0 (L) 08/25/2023    CREATININE 0.83 08/25/2023    EGFRNORACEVR >60 08/25/2023    GLUCOSE 140 (H) 08/25/2023    CALCIUM 9.4 08/25/2023    ALKPHOS 72 08/25/2023    LABPROT 6.2 (L) 08/25/2023    ALBUMIN 3.7 08/25/2023    BILIDIR 0.3 04/30/2022    IBILI 0.30 04/30/2022    AST 14 08/25/2023    ALT 13 08/25/2023    LFMHILTQ31ZO 29.3 (L) 10/17/2022    TSH 2.049 08/25/2023    RLFVYI5XOFL 0.95 08/25/2023        Lab Results   Component Value Date    HGBA1C 6.7 08/25/2023        Hematology:  Lab Results   Component Value Date    WBC 4.08 (L) 08/25/2023    HGB 11.9 (L) 08/25/2023    HCT 36.0 (L) 08/25/2023     08/25/2023       Lipid Panel:  Lab Results   Component Value Date    CHOL 165 08/25/2023    HDL 41 08/25/2023    LDL 83.00 08/25/2023    TRIG 206 (H) 08/25/2023    TOTALCHOLEST 4 08/25/2023        Urine:  Lab Results   Component Value Date    CREATRANDUR 134.2 (H) 05/24/2023        Assessment:        ICD-10-CM ICD-9-CM   1. Type 2 diabetes mellitus with hyperglycemia, without long-term current use of insulin  E11.65 250.00     790.29   2. Chronic sinusitis, unspecified location  J32.9 473.9   3. Hyperlipidemia, unspecified hyperlipidemia type  E78.5 272.4        Plan:     1. Type 2 diabetes mellitus with hyperglycemia, without long-term current use of insulin  Assessment & Plan:  Lab Results   Component Value Date    HGBA1C 6.7 08/25/2023    HGBA1C 7.2 (H) 05/24/2023    LDL 83.00 08/25/2023    CREATININE 0.83 08/25/2023    CREATININE 0.90 04/21/2021      Continue glimepiride 2 mg BID +  Trulicity 0.5 ml weekly.   On ACE and Statin according to guidelines.  Follow ADA Diet. Avoid soda, simple sweets, and limit rice/pasta/breads/starches (no more than 45-50 grams per meal).  Maintain healthy weight with goal BMI <30.  Exercise 5 times per week for 30 minutes per day.  Stressed importance of daily foot exams.  Stressed importance of annual dilated eye exam.          Orders:  -     Lipid Panel; Future; Expected date: 11/27/2023  -     Comprehensive Metabolic Panel; Future; Expected date: 11/27/2023  -     Hemoglobin A1C; Future; Expected date: 11/27/2023    2. Chronic sinusitis, unspecified location  Assessment & Plan:  Start Augmentin 875mg BID x 7 days + Medrol Dose Pack .   Avoid Irritants and allergens.  Wash hands frequently to prevent common colds.  Drink plenty of fluids to thin mucous and/or use humidifier.    Consider NeilMed Saline Sinus Rinse BID.  Apply warm compress for sinus pain.  Use OTC decongestants as needed (HTN patients to avoid sudafed products).       Orders:  -     amoxicillin-clavulanate 875-125mg (AUGMENTIN) 875-125 mg per tablet; Take 1 tablet by mouth every 12 (twelve) hours. for 7 days  Dispense: 14 tablet; Refill: 0  -     methylPREDNISolone (MEDROL DOSEPACK) 4 mg tablet; use as directed  Dispense: 21 each; Refill: 0    3. Hyperlipidemia, unspecified hyperlipidemia type  Assessment & Plan:  Patient was recently switched to atorvastatin 40 mg in October by Cardiology.   Recheck lipid panel today.     Orders:  -     Lipid Panel; Future; Expected date: 11/27/2023       Follow up in about 3 months (around 2/27/2024) for DM F/U . In addition to their scheduled follow up, the patient has also been instructed to follow up on as needed basis.     Chriss Jean Baptiste NP

## 2023-11-29 ENCOUNTER — TELEPHONE (OUTPATIENT)
Dept: FAMILY MEDICINE | Facility: CLINIC | Age: 58
End: 2023-11-29
Payer: MEDICAID

## 2023-11-29 ENCOUNTER — LAB VISIT (OUTPATIENT)
Dept: LAB | Facility: HOSPITAL | Age: 58
End: 2023-11-29
Payer: MEDICAID

## 2023-11-29 DIAGNOSIS — F32.A ANXIETY AND DEPRESSION: ICD-10-CM

## 2023-11-29 DIAGNOSIS — I10 ESSENTIAL HYPERTENSION: ICD-10-CM

## 2023-11-29 DIAGNOSIS — E78.5 HYPERLIPIDEMIA, UNSPECIFIED HYPERLIPIDEMIA TYPE: ICD-10-CM

## 2023-11-29 DIAGNOSIS — G89.29 OTHER CHRONIC PAIN: ICD-10-CM

## 2023-11-29 DIAGNOSIS — L29.9 PRURITUS: ICD-10-CM

## 2023-11-29 DIAGNOSIS — F41.9 ANXIETY AND DEPRESSION: ICD-10-CM

## 2023-11-29 DIAGNOSIS — E11.65 TYPE 2 DIABETES MELLITUS WITH HYPERGLYCEMIA, WITHOUT LONG-TERM CURRENT USE OF INSULIN: ICD-10-CM

## 2023-11-29 LAB
ALBUMIN SERPL-MCNC: 4.3 G/DL (ref 3.5–5)
ALBUMIN/GLOB SERPL: 1.5 RATIO (ref 1.1–2)
ALP SERPL-CCNC: 83 UNIT/L (ref 40–150)
ALT SERPL-CCNC: 15 UNIT/L (ref 0–55)
AST SERPL-CCNC: 14 UNIT/L (ref 5–34)
BILIRUB SERPL-MCNC: 0.4 MG/DL
BUN SERPL-MCNC: 14 MG/DL (ref 9.8–20.1)
CALCIUM SERPL-MCNC: 9.5 MG/DL (ref 8.4–10.2)
CHLORIDE SERPL-SCNC: 109 MMOL/L (ref 98–107)
CHOLEST SERPL-MCNC: 151 MG/DL
CHOLEST/HDLC SERPL: 3 {RATIO} (ref 0–5)
CO2 SERPL-SCNC: 25 MMOL/L (ref 22–29)
CREAT SERPL-MCNC: 0.87 MG/DL (ref 0.55–1.02)
EST. AVERAGE GLUCOSE BLD GHB EST-MCNC: 185.8 MG/DL
GFR SERPLBLD CREATININE-BSD FMLA CKD-EPI: >60 MLS/MIN/1.73/M2
GLOBULIN SER-MCNC: 2.8 GM/DL (ref 2.4–3.5)
GLUCOSE SERPL-MCNC: 238 MG/DL (ref 74–100)
HBA1C MFR BLD: 8.1 %
HDLC SERPL-MCNC: 49 MG/DL (ref 35–60)
LDLC SERPL CALC-MCNC: 71 MG/DL (ref 50–140)
POTASSIUM SERPL-SCNC: 3.7 MMOL/L (ref 3.5–5.1)
PROT SERPL-MCNC: 7.1 GM/DL (ref 6.4–8.3)
SODIUM SERPL-SCNC: 143 MMOL/L (ref 136–145)
TRIGL SERPL-MCNC: 155 MG/DL (ref 37–140)
VLDLC SERPL CALC-MCNC: 31 MG/DL

## 2023-11-29 PROCEDURE — 36415 COLL VENOUS BLD VENIPUNCTURE: CPT

## 2023-11-29 PROCEDURE — 83036 HEMOGLOBIN GLYCOSYLATED A1C: CPT

## 2023-11-29 PROCEDURE — 80053 COMPREHEN METABOLIC PANEL: CPT

## 2023-11-29 PROCEDURE — 80061 LIPID PANEL: CPT

## 2023-11-29 RX ORDER — IBUPROFEN 800 MG/1
TABLET ORAL
Qty: 90 TABLET | Refills: 5 | Status: SHIPPED | OUTPATIENT
Start: 2023-11-29

## 2023-11-29 RX ORDER — NIFEDIPINE 60 MG/1
TABLET, EXTENDED RELEASE ORAL
Qty: 30 TABLET | Refills: 5 | Status: SHIPPED | OUTPATIENT
Start: 2023-11-29

## 2023-11-29 RX ORDER — CITALOPRAM 20 MG/1
TABLET, FILM COATED ORAL
Qty: 30 TABLET | Refills: 5 | Status: SHIPPED | OUTPATIENT
Start: 2023-11-29 | End: 2024-02-27 | Stop reason: SDUPTHER

## 2023-11-29 RX ORDER — HYDROXYZINE PAMOATE 25 MG/1
CAPSULE ORAL
Qty: 30 CAPSULE | Refills: 5 | Status: SHIPPED | OUTPATIENT
Start: 2023-11-29

## 2023-11-29 NOTE — TELEPHONE ENCOUNTER
Patient given results. She is losing her insurance on the first of the month and hopefuly she can get on with her jobs insurance sooner. She will call me when she is to be able to start higher dose of trulicity

## 2023-11-29 NOTE — TELEPHONE ENCOUNTER
----- Message from Heike Babcock sent at 11/29/2023 11:25 AM CST -----  Patient said she needs these refills today bc losing [employer]  hydrOXYzine pamoate (VISTARIL) 25 MG Cap    mg tablet        Citalapram and blood pressure

## 2023-11-29 NOTE — TELEPHONE ENCOUNTER
----- Message from Chriss Jean Baptiste NP sent at 11/29/2023  9:30 AM CST -----  Please inform patient of lab results.     1. Triglycerides are trending down from 3 months ago. Goal is less than 140.     2.A1C is 8.1.. Increased from 3 months ago which was 6.7.      Would recommend increasing Trulicity to 3 mg weekly. Please let me know what the patient says.     Thanks for all you do,   Chriss

## 2023-12-05 DIAGNOSIS — E11.65 TYPE 2 DIABETES MELLITUS WITH HYPERGLYCEMIA, WITHOUT LONG-TERM CURRENT USE OF INSULIN: Primary | ICD-10-CM

## 2023-12-05 RX ORDER — DULAGLUTIDE 3 MG/.5ML
3 INJECTION, SOLUTION SUBCUTANEOUS
Qty: 4 PEN | Refills: 11 | Status: SHIPPED | OUTPATIENT
Start: 2023-12-05 | End: 2024-02-27

## 2023-12-16 ENCOUNTER — HOSPITAL ENCOUNTER (EMERGENCY)
Facility: HOSPITAL | Age: 58
Discharge: HOME | End: 2023-12-16
Attending: FAMILY MEDICINE
Payer: COMMERCIAL

## 2023-12-16 VITALS
TEMPERATURE: 97 F | DIASTOLIC BLOOD PRESSURE: 92 MMHG | HEIGHT: 64 IN | RESPIRATION RATE: 18 BRPM | OXYGEN SATURATION: 100 % | SYSTOLIC BLOOD PRESSURE: 220 MMHG | WEIGHT: 130 LBS | HEART RATE: 86 BPM | BODY MASS INDEX: 22.2 KG/M2

## 2023-12-16 DIAGNOSIS — R10.13 EPIGASTRIC ABDOMINAL PAIN: ICD-10-CM

## 2023-12-16 DIAGNOSIS — K86.89 PANCREATIC MASS: Primary | ICD-10-CM

## 2023-12-16 LAB
ALBUMIN SERPL-MCNC: 4.4 G/DL (ref 3.5–5)
ALBUMIN/GLOB SERPL: 1.3 RATIO (ref 1.1–2)
ALP SERPL-CCNC: 93 UNIT/L (ref 40–150)
ALT SERPL-CCNC: 20 UNIT/L (ref 0–55)
APPEARANCE UR: CLEAR
AST SERPL-CCNC: 14 UNIT/L (ref 5–34)
BACTERIA #/AREA URNS AUTO: ABNORMAL /HPF
BASOPHILS # BLD AUTO: 0.04 X10(3)/MCL
BASOPHILS NFR BLD AUTO: 0.4 %
BILIRUB SERPL-MCNC: 0.8 MG/DL
BILIRUB UR QL STRIP.AUTO: NEGATIVE
BUN SERPL-MCNC: 21 MG/DL (ref 9.8–20.1)
CALCIUM SERPL-MCNC: 9.8 MG/DL (ref 8.4–10.2)
CHLORIDE SERPL-SCNC: 96 MMOL/L (ref 98–107)
CO2 SERPL-SCNC: 22 MMOL/L (ref 22–29)
COLOR UR AUTO: YELLOW
CREAT SERPL-MCNC: 1.27 MG/DL (ref 0.55–1.02)
EOSINOPHIL # BLD AUTO: 0.03 X10(3)/MCL (ref 0–0.9)
EOSINOPHIL NFR BLD AUTO: 0.3 %
ERYTHROCYTE [DISTWIDTH] IN BLOOD BY AUTOMATED COUNT: 13.4 % (ref 11.5–17)
GFR SERPLBLD CREATININE-BSD FMLA CKD-EPI: 50 MLS/MIN/1.73/M2
GLOBULIN SER-MCNC: 3.4 GM/DL (ref 2.4–3.5)
GLUCOSE SERPL-MCNC: 378 MG/DL (ref 74–100)
GLUCOSE UR QL STRIP.AUTO: ABNORMAL
HCT VFR BLD AUTO: 44.9 % (ref 37–47)
HGB BLD-MCNC: 15.5 G/DL (ref 12–16)
HYALINE CASTS URNS QL MICRO: ABNORMAL /LPF
IMM GRANULOCYTES # BLD AUTO: 0.03 X10(3)/MCL (ref 0–0.04)
IMM GRANULOCYTES NFR BLD AUTO: 0.3 %
KETONES UR QL STRIP.AUTO: ABNORMAL
LEUKOCYTE ESTERASE UR QL STRIP.AUTO: NEGATIVE
LIPASE SERPL-CCNC: 12 U/L
LYMPHOCYTES # BLD AUTO: 2.32 X10(3)/MCL (ref 0.6–4.6)
LYMPHOCYTES NFR BLD AUTO: 22.2 %
MCH RBC QN AUTO: 28.9 PG (ref 27–31)
MCHC RBC AUTO-ENTMCNC: 34.5 G/DL (ref 33–36)
MCV RBC AUTO: 83.8 FL (ref 80–94)
MONOCYTES # BLD AUTO: 0.94 X10(3)/MCL (ref 0.1–1.3)
MONOCYTES NFR BLD AUTO: 9 %
MUCOUS THREADS URNS QL MICRO: ABNORMAL /LPF
NEUTROPHILS # BLD AUTO: 7.07 X10(3)/MCL (ref 2.1–9.2)
NEUTROPHILS NFR BLD AUTO: 67.8 %
NITRITE UR QL STRIP.AUTO: NEGATIVE
NRBC BLD AUTO-RTO: 0 %
PH UR STRIP.AUTO: 6 [PH]
PLATELET # BLD AUTO: 286 X10(3)/MCL (ref 130–400)
PMV BLD AUTO: 10.8 FL (ref 7.4–10.4)
POCT GLUCOSE: 228 MG/DL (ref 70–110)
POTASSIUM SERPL-SCNC: 3.4 MMOL/L (ref 3.5–5.1)
PROT SERPL-MCNC: 7.8 GM/DL (ref 6.4–8.3)
PROT UR QL STRIP.AUTO: ABNORMAL
RBC # BLD AUTO: 5.36 X10(6)/MCL (ref 4.2–5.4)
RBC #/AREA URNS AUTO: ABNORMAL /HPF
RBC UR QL AUTO: ABNORMAL
SODIUM SERPL-SCNC: 134 MMOL/L (ref 136–145)
SP GR UR STRIP.AUTO: 1.02 (ref 1–1.03)
SQUAMOUS #/AREA URNS AUTO: ABNORMAL /HPF
TROPONIN I SERPL-MCNC: 0.04 NG/ML (ref 0–0.04)
UROBILINOGEN UR STRIP-ACNC: 0.2
WBC # SPEC AUTO: 10.43 X10(3)/MCL (ref 4.5–11.5)
WBC #/AREA URNS AUTO: ABNORMAL /HPF

## 2023-12-16 PROCEDURE — 84484 ASSAY OF TROPONIN QUANT: CPT | Performed by: FAMILY MEDICINE

## 2023-12-16 PROCEDURE — 25000003 PHARM REV CODE 250: Performed by: FAMILY MEDICINE

## 2023-12-16 PROCEDURE — 81001 URINALYSIS AUTO W/SCOPE: CPT | Performed by: FAMILY MEDICINE

## 2023-12-16 PROCEDURE — 83690 ASSAY OF LIPASE: CPT | Performed by: FAMILY MEDICINE

## 2023-12-16 PROCEDURE — 25500020 PHARM REV CODE 255: Performed by: FAMILY MEDICINE

## 2023-12-16 PROCEDURE — 99285 EMERGENCY DEPT VISIT HI MDM: CPT | Mod: 25

## 2023-12-16 PROCEDURE — 93005 ELECTROCARDIOGRAM TRACING: CPT

## 2023-12-16 PROCEDURE — 96360 HYDRATION IV INFUSION INIT: CPT | Mod: 59

## 2023-12-16 PROCEDURE — 80053 COMPREHEN METABOLIC PANEL: CPT | Performed by: FAMILY MEDICINE

## 2023-12-16 PROCEDURE — 85025 COMPLETE CBC W/AUTO DIFF WBC: CPT | Performed by: FAMILY MEDICINE

## 2023-12-16 PROCEDURE — 82962 GLUCOSE BLOOD TEST: CPT

## 2023-12-16 RX ORDER — ESOMEPRAZOLE MAGNESIUM 40 MG/1
40 CAPSULE, DELAYED RELEASE ORAL
Qty: 30 CAPSULE | Refills: 11 | Status: SHIPPED | OUTPATIENT
Start: 2023-12-16 | End: 2024-12-15

## 2023-12-16 RX ORDER — HYDROCODONE BITARTRATE AND ACETAMINOPHEN 7.5; 325 MG/1; MG/1
1 TABLET ORAL EVERY 6 HOURS PRN
Qty: 15 TABLET | Refills: 0 | Status: SHIPPED | OUTPATIENT
Start: 2023-12-16 | End: 2023-12-20

## 2023-12-16 RX ORDER — PANTOPRAZOLE SODIUM 40 MG/1
40 TABLET, DELAYED RELEASE ORAL DAILY
Status: DISCONTINUED | OUTPATIENT
Start: 2023-12-16 | End: 2023-12-16 | Stop reason: HOSPADM

## 2023-12-16 RX ADMIN — PANTOPRAZOLE SODIUM 40 MG: 40 TABLET, DELAYED RELEASE ORAL at 12:12

## 2023-12-16 RX ADMIN — IOPAMIDOL 100 ML: 755 INJECTION, SOLUTION INTRAVENOUS at 02:12

## 2023-12-16 RX ADMIN — SODIUM CHLORIDE 1000 ML: 9 INJECTION, SOLUTION INTRAVENOUS at 03:12

## 2023-12-16 NOTE — Clinical Note
"Jocelyne Dickinson (Charlene) was seen and treated in our emergency department on 12/16/2023.  She may return to work on 12/20/2023.       If you have any questions or concerns, please don't hesitate to call.       RN    "

## 2023-12-16 NOTE — ED PROVIDER NOTES
Encounter Date: 12/16/2023       History     Chief Complaint   Patient presents with    Abdominal Pain     Epigastric pain radiates to back x 3 days with sweating.  Nausea when tries to eat.       This is a 58-year-old white female presents with 3 day history of epigastric pain that radiates to the back.  She has a history of peptic ulcer disease and is prescribed Carafate for it but she says that is no longer working.  Epigastric pain is described as sharp and aching and it was 10/10 in severity.  She says that she has been warned to stops stressing because it is the cause of her ulcer.  She has a past medical history of anxiety disorder, COPD, diabetes, hypotension, osteoarthritis, hyperlipidemia and sleep apnea    The history is provided by the patient.     Review of patient's allergies indicates:   Allergen Reactions    Aller ext-american cockroach Itching and Other (See Comments)    Allerg ext-tree poll-red maple Itching and Other (See Comments)    Cat hair standardized allergenic extract Itching and Other (See Comments)    Dog hair standardized allergenic extract Itching and Other (See Comments)    Grass pollen-ruslan, standard Itching and Other (See Comments)    Horse dander Itching and Other (See Comments)    Tree pollen-box elder Itching and Other (See Comments)    Tree pollen-pecan Itching and Other (See Comments)     Past Medical History:   Diagnosis Date    Anxiety disorder, unspecified     Carotid artery stenosis     Cellulitis of scalp     COPD (chronic obstructive pulmonary disease)     Diabetes mellitus     Emphysema, unspecified     HTN (hypertension)     Mixed hyperlipidemia     Neuropathy     Osteoarthritis     Personal history of nicotine dependence     Pneumonia, unspecified organism     Sleep apnea, unspecified     Syncope      Past Surgical History:   Procedure Laterality Date    CATARACT EXTRACTION      COLONOSCOPY  07/02/2021    Dr. Nila Sharma    REPAIR OF CAROTID ARTERY       TONSILLECTOMY       Family History   Problem Relation Age of Onset    Hypertension Mother     Hypertension Father     Heart disease Father     Cancer Father     Stroke Father     Cancer Maternal Grandmother     Vision loss Maternal Grandmother      Social History     Tobacco Use    Smoking status: Every Day     Current packs/day: 0.00     Average packs/day: 1.5 packs/day for 40.0 years (60.0 ttl pk-yrs)     Types: Vaping with nicotine, Cigarettes     Start date:      Last attempt to quit: 2016     Years since quittin.9    Smokeless tobacco: Never   Substance Use Topics    Alcohol use: Not Currently    Drug use: Never     Review of Systems   Constitutional:  Negative for fever.   HENT:  Negative for sore throat.    Respiratory:  Negative for shortness of breath.    Cardiovascular:  Negative for chest pain.   Gastrointestinal:  Positive for abdominal pain, nausea and vomiting.   Genitourinary:  Negative for dysuria.   Musculoskeletal:  Negative for back pain.   Skin:  Negative for rash.   Neurological:  Negative for weakness.   Hematological:  Does not bruise/bleed easily.       Physical Exam     Initial Vitals [23 1207]   BP Pulse Resp Temp SpO2   115/85 (!) 125 20 97.3 °F (36.3 °C) 98 %      MAP       --         Physical Exam    Nursing note and vitals reviewed.  Constitutional: She appears well-developed and well-nourished. She appears distressed.   HENT:   Head: Normocephalic and atraumatic.   Eyes: Pupils are equal, round, and reactive to light.   Neck: Neck supple.   Normal range of motion.  Cardiovascular:  Normal rate, regular rhythm and normal heart sounds.           Pulmonary/Chest: Breath sounds normal.   Abdominal: Abdomen is soft. Bowel sounds are normal. There is abdominal tenderness.   Musculoskeletal:         General: Normal range of motion.      Cervical back: Normal range of motion and neck supple.     Neurological: She is alert and oriented to person, place, and time.         ED Course    Procedures  Labs Reviewed   COMPREHENSIVE METABOLIC PANEL - Abnormal; Notable for the following components:       Result Value    Sodium Level 134 (*)     Potassium Level 3.4 (*)     Chloride 96 (*)     Glucose Level 378 (*)     Blood Urea Nitrogen 21.0 (*)     Creatinine 1.27 (*)     All other components within normal limits   URINALYSIS - Abnormal; Notable for the following components:    Protein, UA 1+ (*)     Glucose, UA 1+ (*)     Ketones, UA 2+ (*)     Blood, UA Trace (*)     All other components within normal limits   CBC WITH DIFFERENTIAL - Abnormal; Notable for the following components:    MPV 10.8 (*)     All other components within normal limits   URINALYSIS, MICROSCOPIC - Abnormal; Notable for the following components:    Bacteria, UA Few (*)     Hyaline Casts, UA Moderate (*)     Mucous, UA Moderate (*)     Squamous Epithelial Cells, UA Few (*)     All other components within normal limits   POCT GLUCOSE - Abnormal; Notable for the following components:    POCT Glucose 228 (*)     All other components within normal limits   LIPASE - Normal   TROPONIN I - Normal   CBC W/ AUTO DIFFERENTIAL    Narrative:     The following orders were created for panel order CBC auto differential.  Procedure                               Abnormality         Status                     ---------                               -----------         ------                     CBC with Differential[[id number]]       Abnormal            Final result                 Please view results for these tests on the individual orders.   HELICOBACTER PYLORI ANTIGEN FECAL EIA   POCT GLUCOSE MONITORING CONTINUOUS     EKG Readings: (Independently Interpreted)   Rhythm: Sinus Tachycardia.   Sinus tachycardia, ventricular rate 119, VT interval 144, QRS duration 76,        Imaging Results              CT Abdomen Pelvis With IV Contrast NO Oral Contrast (Final result)  Result time 12/16/23 14:57:11      Final result by Mynor Chau MD  (12/16/23 14:57:11)                   Impression:      Pancreatic atrophy with cystic changes to the tail of the pancreas are slightly progressed in the interval. Recommend further evaluation with pancreatic MR on a nonemergent outpatient basis.  Cystic pancreatic malignancy is not excluded.  Correlate with patient's history.    Thickening of the distal stomach with no gross inflammatory change. Mild gastritis is not excluded.    The bladder wall is diffusely thickened.  Correlate with urinalysis.      Electronically signed by: Mynor Chau  Date:    12/16/2023  Time:    14:57               Narrative:    EXAMINATION:  CT ABDOMEN PELVIS WITH IV CONTRAST    CLINICAL HISTORY:  epigastric pain;    TECHNIQUE:  Multidetector IV contrast enhanced axial CT images of the abdomen and pelvis were obtained with coronal and sagittal reconstructions.    Automatic exposure control was utilized to reduce the patient's radiation dose.    DLP= 200    COMPARISON:  08/06/2021    FINDINGS:  01. HEPATOBILIARY: No focal hepatic lesion is identified, The gallbladder is normal.    02. SPLEEN: Normal    03. PANCREAS: Pancreatic atrophy with cystic changes to the tail of the pancreas are slightly progressed in the interval.  Recommend further evaluation with pancreatic MR on a nonemergent outpatient basis.    04. ADRENALS: No adrenal nodules.    05. KIDNEYS: The right kidney demonstrates no stone, hydronephrosis, or hydroureter. No focal mass identified. The left kidney demonstrates no stone, hydronephrosis, or hydroureter. No focal mass identified.    06. LYMPHADENOPATHY/RETROPERITONEUM: There is no retroperitoneal lymphadenopathy. The abdominal aorta is normal in course and caliber. There are diffuse scattered mural atheromatous calcifications in the aortoiliac system.    07. BOWEL: Thickening of the distal stomach with no gross inflammatory change.  Mild gastritis is not excluded.  No evidence of appendiceal inflammation.    08. PELVIC  VISCERA: The bladder wall is diffusely thickened.    09. PELVIC LYMPH NODES: No lymphadenopathy.    10. PERITONEUM/ABDOMINAL WALL: No ascites or implant.    11. SKELETAL: No aggressive appearing lytic/blastic lesion. No acute fractures, subluxations or dislocations.    12. LUNG BASES: The visualized lungs are unremarkable.                                       Medications   pantoprazole EC tablet 40 mg (40 mg Oral Given 12/16/23 1238)   insulin regular injection 5 Units 0.05 mL (has no administration in time range)   sodium chloride 0.9% bolus 1,000 mL 1,000 mL (1,000 mLs Intravenous New Bag 12/16/23 1544)   iopamidoL (ISOVUE-370) injection 100 mL (100 mLs Intravenous Given 12/16/23 1444)     Medical Decision Making  Differential diagnosis:  Peptic ulcer disease, cholelithiasis, biliary colic, pancreatic mass, pancreatitis, epigastric pain, gastroenteritis, acute MI, esophagitis    Amount and/or Complexity of Data Reviewed  Labs: ordered.  Radiology: ordered.    Risk  OTC drugs.  Prescription drug management.                                      Clinical Impression:  Final diagnoses:  [R10.13] Epigastric abdominal pain  [K86.89] Pancreatic mass (Primary)          ED Disposition Condition    Discharge Stable          ED Prescriptions       Medication Sig Dispense Start Date End Date Auth. Provider    HYDROcodone-acetaminophen (NORCO) 7.5-325 mg per tablet Take 1 tablet by mouth every 6 (six) hours as needed for Pain. 15 tablet 12/16/2023 12/20/2023 Anabella Cottrell MD    esomeprazole (NEXIUM) 40 MG capsule Take 1 capsule (40 mg total) by mouth before breakfast. 30 capsule 12/16/2023 12/15/2024 Anabella Cottrell MD          Follow-up Information       Follow up With Specialties Details Why Contact Info    Edi Roldan, DO Family Medicine In 2 days You need to have MRI of your abdomen with focus to pancrease [address] [facility] [address]  [phone]               Anabella Cottrell MD  12/16/23  [id number]

## 2023-12-16 NOTE — ED NOTES
"Pt to Ed with c/o epigastric pain @ 3 days, associated with chills. Pt reports she has "ulcer" that Dr Koenig had given her carafate for which she sts the meds  in . Pain is worse with movement, denies SOB, denies trauma. Pt HR elevated, EKG performed, placed on cardiac monitoring. AAOX4  "

## 2023-12-18 ENCOUNTER — TELEPHONE (OUTPATIENT)
Dept: FAMILY MEDICINE | Facility: CLINIC | Age: 58
End: 2023-12-18
Payer: COMMERCIAL

## 2023-12-18 DIAGNOSIS — K86.2 PANCREATIC CYST: Primary | ICD-10-CM

## 2023-12-18 DIAGNOSIS — K31.89 GASTRIC WALL THICKENING: ICD-10-CM

## 2023-12-18 NOTE — TELEPHONE ENCOUNTER
Notified pt we will get scan first then refer to specialist if needed.   Authorization approved and scanned into media.

## 2023-12-18 NOTE — TELEPHONE ENCOUNTER
Patient went to ER 12/16/23. They did a CT and CT recommended MRI. Pt is very concerned. Spoke with Dr Roldan, ordering MRCP to r/o pancreatic malignancy.

## 2023-12-19 ENCOUNTER — HOSPITAL ENCOUNTER (OUTPATIENT)
Dept: RADIOLOGY | Facility: HOSPITAL | Age: 58
Discharge: HOME | End: 2023-12-19
Attending: FAMILY MEDICINE
Payer: COMMERCIAL

## 2023-12-19 DIAGNOSIS — K31.89 GASTRIC WALL THICKENING: ICD-10-CM

## 2023-12-19 DIAGNOSIS — K86.2 PANCREATIC CYST: Primary | ICD-10-CM

## 2023-12-19 DIAGNOSIS — K86.2 PANCREATIC CYST: ICD-10-CM

## 2023-12-19 PROCEDURE — A9577 INJ MULTIHANCE: HCPCS | Performed by: FAMILY MEDICINE

## 2023-12-19 PROCEDURE — 74183 MRI ABD W/O CNTR FLWD CNTR: CPT | Mod: TC

## 2023-12-19 PROCEDURE — 76376 3D RENDER W/INTRP POSTPROCES: CPT | Mod: TC

## 2023-12-19 PROCEDURE — 25500020 PHARM REV CODE 255: Performed by: FAMILY MEDICINE

## 2023-12-19 RX ADMIN — GADOBENATE DIMEGLUMINE 12 ML: 529 INJECTION, SOLUTION INTRAVENOUS at 09:12

## 2023-12-27 ENCOUNTER — OFFICE VISIT (OUTPATIENT)
Dept: FAMILY MEDICINE | Facility: CLINIC | Age: 58
End: 2023-12-27
Payer: COMMERCIAL

## 2023-12-27 VITALS
HEIGHT: 64 IN | WEIGHT: 127 LBS | BODY MASS INDEX: 21.68 KG/M2 | DIASTOLIC BLOOD PRESSURE: 71 MMHG | SYSTOLIC BLOOD PRESSURE: 117 MMHG | HEART RATE: 85 BPM | TEMPERATURE: 98 F | OXYGEN SATURATION: 98 % | RESPIRATION RATE: 18 BRPM

## 2023-12-27 DIAGNOSIS — R93.5 ABNORMAL MRI OF ABDOMEN: Primary | ICD-10-CM

## 2023-12-27 DIAGNOSIS — K21.9 GASTROESOPHAGEAL REFLUX DISEASE WITHOUT ESOPHAGITIS: ICD-10-CM

## 2023-12-27 PROCEDURE — 1159F MED LIST DOCD IN RCRD: CPT | Mod: CPTII,,, | Performed by: FAMILY MEDICINE

## 2023-12-27 PROCEDURE — 3066F PR DOCUMENTATION OF TREATMENT FOR NEPHROPATHY: ICD-10-PCS | Mod: CPTII,,, | Performed by: FAMILY MEDICINE

## 2023-12-27 PROCEDURE — 3008F PR BODY MASS INDEX (BMI) DOCUMENTED: ICD-10-PCS | Mod: CPTII,,, | Performed by: FAMILY MEDICINE

## 2023-12-27 PROCEDURE — 3074F SYST BP LT 130 MM HG: CPT | Mod: CPTII,,, | Performed by: FAMILY MEDICINE

## 2023-12-27 PROCEDURE — 1160F RVW MEDS BY RX/DR IN RCRD: CPT | Mod: CPTII,,, | Performed by: FAMILY MEDICINE

## 2023-12-27 PROCEDURE — 3008F BODY MASS INDEX DOCD: CPT | Mod: CPTII,,, | Performed by: FAMILY MEDICINE

## 2023-12-27 PROCEDURE — 2023F PR DILATED RETINAL EXAM W/O EVID OF RETINOPATHY: ICD-10-PCS | Mod: CPTII,,, | Performed by: FAMILY MEDICINE

## 2023-12-27 PROCEDURE — 3078F PR MOST RECENT DIASTOLIC BLOOD PRESSURE < 80 MM HG: ICD-10-PCS | Mod: CPTII,,, | Performed by: FAMILY MEDICINE

## 2023-12-27 PROCEDURE — 3052F HG A1C>EQUAL 8.0%<EQUAL 9.0%: CPT | Mod: CPTII,,, | Performed by: FAMILY MEDICINE

## 2023-12-27 PROCEDURE — 3066F NEPHROPATHY DOC TX: CPT | Mod: CPTII,,, | Performed by: FAMILY MEDICINE

## 2023-12-27 PROCEDURE — 3052F PR MOST RECENT HEMOGLOBIN A1C LEVEL 8.0 - < 9.0%: ICD-10-PCS | Mod: CPTII,,, | Performed by: FAMILY MEDICINE

## 2023-12-27 PROCEDURE — 3074F PR MOST RECENT SYSTOLIC BLOOD PRESSURE < 130 MM HG: ICD-10-PCS | Mod: CPTII,,, | Performed by: FAMILY MEDICINE

## 2023-12-27 PROCEDURE — 3061F NEG MICROALBUMINURIA REV: CPT | Mod: CPTII,,, | Performed by: FAMILY MEDICINE

## 2023-12-27 PROCEDURE — 99214 PR OFFICE/OUTPT VISIT, EST, LEVL IV, 30-39 MIN: ICD-10-PCS | Mod: ,,, | Performed by: FAMILY MEDICINE

## 2023-12-27 PROCEDURE — 99214 OFFICE O/P EST MOD 30 MIN: CPT | Mod: ,,, | Performed by: FAMILY MEDICINE

## 2023-12-27 PROCEDURE — 1160F PR REVIEW ALL MEDS BY PRESCRIBER/CLIN PHARMACIST DOCUMENTED: ICD-10-PCS | Mod: CPTII,,, | Performed by: FAMILY MEDICINE

## 2023-12-27 PROCEDURE — 4010F PR ACE/ARB THEARPY RXD/TAKEN: ICD-10-PCS | Mod: CPTII,,, | Performed by: FAMILY MEDICINE

## 2023-12-27 PROCEDURE — 2023F DILAT RTA XM W/O RTNOPTHY: CPT | Mod: CPTII,,, | Performed by: FAMILY MEDICINE

## 2023-12-27 PROCEDURE — 4010F ACE/ARB THERAPY RXD/TAKEN: CPT | Mod: CPTII,,, | Performed by: FAMILY MEDICINE

## 2023-12-27 PROCEDURE — 1159F PR MEDICATION LIST DOCUMENTED IN MEDICAL RECORD: ICD-10-PCS | Mod: CPTII,,, | Performed by: FAMILY MEDICINE

## 2023-12-27 PROCEDURE — 3078F DIAST BP <80 MM HG: CPT | Mod: CPTII,,, | Performed by: FAMILY MEDICINE

## 2023-12-27 PROCEDURE — 3061F PR NEG MICROALBUMINURIA RESULT DOCUMENTED/REVIEW: ICD-10-PCS | Mod: CPTII,,, | Performed by: FAMILY MEDICINE

## 2023-12-27 RX ORDER — SUCRALFATE 1 G/10ML
1 SUSPENSION ORAL 4 TIMES DAILY
Qty: 414 ML | Refills: 5 | Status: SHIPPED | OUTPATIENT
Start: 2023-12-27

## 2023-12-27 RX ORDER — SUCRALFATE 1 G/1
1 TABLET ORAL
Qty: 120 TABLET | Refills: 5 | Status: CANCELLED | OUTPATIENT
Start: 2023-12-27

## 2023-12-27 NOTE — PROGRESS NOTES
Patient ID: [id number]     Chief Complaint: Results    HPI:     Jocelyne Dickinson is a 58 y.o. female here today for Results of recent MRI of abdomen.  Possible intraductal papillary mucinous neoplasm vs ductal stricture per Radiology report.     ----------------------------  Anxiety disorder, unspecified  Carotid artery stenosis  Cellulitis of scalp  COPD (chronic obstructive pulmonary disease)  Diabetes mellitus  Emphysema, unspecified  HTN (hypertension)  Mixed hyperlipidemia  Neuropathy  Osteoarthritis  Personal history of nicotine dependence  Pneumonia, unspecified organism  Sleep apnea, unspecified  Syncope     Past Surgical History:   Procedure Laterality Date    CATARACT EXTRACTION      COLONOSCOPY  2021    Dr. Nila Sharma    REPAIR OF CAROTID ARTERY      TONSILLECTOMY         Review of patient's allergies indicates:   Allergen Reactions    Aller ext-american cockroach Itching and Other (See Comments)    Allerg ext-tree poll-red maple Itching and Other (See Comments)    Cat hair standardized allergenic extract Itching and Other (See Comments)    Dog hair standardized allergenic extract Itching and Other (See Comments)    Grass pollen-ruslan, standard Itching and Other (See Comments)    Horse dander Itching and Other (See Comments)    Tree pollen-box elder Itching and Other (See Comments)    Tree pollen-pecan Itching and Other (See Comments)       Outpatient Medications Marked as Taking for the 23 encounter (Office Visit) with Edi Roldan, DO   Medication Sig Dispense Refill    albuterol (PROVENTIL/VENTOLIN HFA) 90 mcg/actuation inhaler inhale TWO puffs EVERY 4 TO 6 HOURS AS NEEDED FOR wheezing 6.7 g 6    ALPRAZolam (XANAX) 0.5 MG tablet Take 1 tablet (0.5 mg total) by mouth 2 (two) times daily. 60 tablet 5    atorvastatin (LIPITOR) 40 MG tablet Take 40 mg by mouth.      azelastine (ASTELIN) 137 mcg (0.1 %) nasal spray SMARTSI-2 Spray(s) Both Nares Twice Daily      citalopram (CELEXA)  20 MG tablet TAKE ONE TABLET BY MOUTH DAILY 30 tablet 5    clopidogreL (PLAVIX) 75 mg tablet Take 75 mg by mouth once daily.      dulaglutide (TRULICITY) 3 mg/0.5 mL pen injector Inject 3 mg into the skin every 7 days. 4 pen 11    esomeprazole (NEXIUM) 40 MG capsule Take 1 capsule (40 mg total) by mouth before breakfast. 30 capsule 11    fluticasone propionate (FLONASE) 50 mcg/actuation nasal spray USE 1 SPRAY IN EACH NOSTRIL DAILY 16 g 4    gabapentin (NEURONTIN) 300 MG capsule Take 1 capsule (300 mg total) by mouth every evening. 30 capsule 11    glimepiride (AMARYL) 2 MG tablet TAKE ONE TABLET BY MOUTH TWICE DAILY FOR DIABETES 180 tablet 3    hydrocortisone 2.5 % cream SMARTSIG:sparingly Topical 3 Times Daily      hydrOXYzine pamoate (VISTARIL) 25 MG Cap TAKE ONE CAPSULE BY MOUTH DAILY 30 capsule 5     mg tablet TAKE ONE TABLET BY MOUTH THREE TIMES DAILY WITH FOOD AS NEEDED FOR PAIN 90 tablet 5    linaGLIPtin (TRADJENTA) 5 mg Tab tablet TAKE 1 TABLET BY MOUTH DAILY 30 tablet 5    losartan (COZAAR) 100 MG tablet TAKE 1 TABLET BY MOUTH DAILY FOR BLOOD PRESSURE 30 tablet 5    methylPREDNISolone (MEDROL DOSEPACK) 4 mg tablet use as directed 21 each 0    mirtazapine (REMERON) 7.5 MG Tab Take 1 tablet (7.5 mg total) by mouth every evening. 30 tablet 11    NIFEdipine (PROCARDIA-XL) 60 MG (OSM) 24 hr tablet TAKE ONE TABLET BY MOUTH DAILY FOR BLOOD PRESSURE 30 tablet 5    omeprazole (PRILOSEC) 40 MG capsule TAKE ONE CAPSULE BY MOUTH DAILY for stomach 30 capsule 5    SPIRIVA WITH HANDIHALER 18 mcg inhalation capsule Inhale 1 capsule into the lungs Daily.      sucralfate (CARAFATE) 1 gram tablet Take 1 g by mouth 4 (four) times daily before meals and nightly.      SYMBICORT 160-4.5 mcg/actuation HFAA INHALE 2 PUFFS TWICE A DAY 10.2 g 6       Social History     Socioeconomic History    Marital status: Single   Tobacco Use    Smoking status: Every Day     Current packs/day: 0.00     Average packs/day: 1.5 packs/day for  40.0 years (60.0 ttl pk-yrs)     Types: Vaping with nicotine, Cigarettes     Start date:      Last attempt to quit: 2016     Years since quittin.0    Smokeless tobacco: Never   Substance and Sexual Activity    Alcohol use: Not Currently    Drug use: Never    Sexual activity: Not Currently     Social Determinants of Health     Financial Resource Strain: Medium Risk (2023)    Overall Financial Resource Strain (CARDIA)     Difficulty of Paying Living Expenses: Somewhat hard   Food Insecurity: Unknown (2023)    Hunger Vital Sign     Worried About Running Out of Food in the Last Year: Patient declined     Ran Out of Food in the Last Year: Never true   Transportation Needs: No Transportation Needs (2023)    PRAPARE - Transportation     Lack of Transportation (Medical): No     Lack of Transportation (Non-Medical): No   Physical Activity: Unknown (2023)    Exercise Vital Sign     Days of Exercise per Week: Patient declined     Minutes of Exercise per Session: 10 min   Stress: Stress Concern Present (2023)    [employer] [place] [employer] - Occupational Stress Questionnaire     Feeling of Stress : To some extent   Social Connections: Moderately Isolated (2023)    Social Connection and Isolation Panel [NHANES]     Frequency of Communication with Friends and Family: More than three times a week     Frequency of Social Gatherings with Friends and Family: More than three times a week     Attends Latter day Services: More than 4 times per year     Active Member of Clubs or Organizations: No     Attends Club or Organization Meetings: Patient declined     Marital Status:    Housing Stability: Low Risk  (2023)    Housing Stability Vital Sign     Unable to Pay for Housing in the Last Year: No     Number of Places Lived in the Last Year: 1     Unstable Housing in the Last Year: No        Family History   Problem Relation Age of Onset    Hypertension Mother      "Hypertension Father     Heart disease Father     Cancer Father     Stroke Father     Cancer Maternal Grandmother     Vision loss Maternal Grandmother         Patient Care Team:  Edi Roldan DO as PCP - General (Family Medicine)  Drew Gardiner MD as Consulting Physician (Otolaryngology)  Ricky Foster MD as Consulting Physician (Pulmonary Disease)  Solomon Kolb MD as Consulting Physician (Cardiology)     Subjective:     Review of Systems   Constitutional:  Negative for chills and fever.   Respiratory:  Negative for shortness of breath.    Cardiovascular:  Negative for chest pain.   Gastrointestinal:  Negative for constipation and diarrhea.   Neurological:  Negative for headaches.   Psychiatric/Behavioral:  The patient is nervous/anxious. The patient does not have insomnia.        See HPI for details  All Other ROS: Negative except as stated in HPI.       Objective:     /71   Pulse 85   Temp 97.7 °F (36.5 °C) (Temporal)   Resp 18   Ht 5' 3.78" (1.62 m)   Wt 57.6 kg (127 lb)   SpO2 98%   BMI 21.95 kg/m²     Physical Exam  Vitals reviewed.   Constitutional:       General: She is not in acute distress.     Appearance: Normal appearance.   Cardiovascular:      Rate and Rhythm: Normal rate and regular rhythm.      Heart sounds: No murmur heard.     No friction rub. No gallop.   Pulmonary:      Effort: No respiratory distress.      Breath sounds: No wheezing, rhonchi or rales.   Musculoskeletal:         General: No swelling, tenderness or deformity.      Right lower leg: No edema.      Left lower leg: No edema.   Skin:     General: Skin is warm and dry.      Findings: No lesion or rash.   Neurological:      General: No focal deficit present.      Mental Status: She is alert.   Psychiatric:         Mood and Affect: Mood normal.         Assessment/Plan:     1. Abnormal MRI of abdomen  -     Ambulatory referral/consult to Surgical Oncology; Future; Expected date: 12/30/2023  Possible intraductal " papillary mucinous neoplasm vs ductal stricture per Radiology report.   2. Gastroesophageal reflux disease without esophagitis  -     sucralfate (CARAFATE) 100 mg/mL suspension; Take 10 mLs (1 g total) by mouth 4 (four) times daily.  Dispense: 414 mL; Refill: 5       Follow up:     Follow up if symptoms worsen or fail to improve. In addition to their scheduled follow up, the patient has also been instructed to follow up on as needed basis.

## 2024-01-08 ENCOUNTER — TELEPHONE (OUTPATIENT)
Dept: FAMILY MEDICINE | Facility: CLINIC | Age: 59
End: 2024-01-08
Payer: COMMERCIAL

## 2024-01-08 DIAGNOSIS — R93.5 ABNORMAL MRI OF ABDOMEN: Primary | ICD-10-CM

## 2024-01-08 NOTE — TELEPHONE ENCOUNTER
----- Message from Heike Babcock sent at 1/8/2024 11:33 AM CST -----  Regarding: Oncology Referral  Sent referral to Miguelito العلي on 12/30, patient called bc she never heard anything, when I went to check on referral this message was in the note section.. 1/3/24: patient needs to see GI for tissue diagnosis prior to seeing Dr. Miguelito العلي- will notify referring office    What is the next step

## 2024-01-11 ENCOUNTER — LAB VISIT (OUTPATIENT)
Dept: LAB | Facility: HOSPITAL | Age: 59
End: 2024-01-11
Attending: INTERNAL MEDICINE
Payer: COMMERCIAL

## 2024-01-11 DIAGNOSIS — R06.09 DYSPNEA ON EXERTION: ICD-10-CM

## 2024-01-11 DIAGNOSIS — J43.9 EMPHYSEMATOUS BLEB: ICD-10-CM

## 2024-01-11 DIAGNOSIS — R60.9 EDEMA, UNSPECIFIED TYPE: ICD-10-CM

## 2024-01-11 DIAGNOSIS — I10 HYPERTENSION, UNSPECIFIED TYPE: ICD-10-CM

## 2024-01-11 DIAGNOSIS — R93.1 ABNORMAL ECHOCARDIOGRAM: ICD-10-CM

## 2024-01-11 DIAGNOSIS — E78.2 MIXED HYPERLIPIDEMIA: ICD-10-CM

## 2024-01-11 DIAGNOSIS — E11.9 DIABETES MELLITUS WITHOUT COMPLICATION: Primary | ICD-10-CM

## 2024-01-11 LAB
ALBUMIN SERPL-MCNC: 3.8 G/DL (ref 3.5–5)
ALBUMIN/GLOB SERPL: 1.7 RATIO (ref 1.1–2)
ALP SERPL-CCNC: 68 UNIT/L (ref 40–150)
ALT SERPL-CCNC: 18 UNIT/L (ref 0–55)
AST SERPL-CCNC: 21 UNIT/L (ref 5–34)
BILIRUB SERPL-MCNC: 0.5 MG/DL
BUN SERPL-MCNC: 11 MG/DL (ref 9.8–20.1)
CALCIUM SERPL-MCNC: 8.8 MG/DL (ref 8.4–10.2)
CHLORIDE SERPL-SCNC: 106 MMOL/L (ref 98–107)
CHOLEST SERPL-MCNC: 129 MG/DL
CHOLEST/HDLC SERPL: 3 {RATIO} (ref 0–5)
CO2 SERPL-SCNC: 30 MMOL/L (ref 22–29)
CREAT SERPL-MCNC: 0.83 MG/DL (ref 0.55–1.02)
GFR SERPLBLD CREATININE-BSD FMLA CKD-EPI: >60 MLS/MIN/1.73/M2
GLOBULIN SER-MCNC: 2.3 GM/DL (ref 2.4–3.5)
GLUCOSE SERPL-MCNC: 181 MG/DL (ref 74–100)
HDLC SERPL-MCNC: 46 MG/DL (ref 35–60)
LDLC SERPL CALC-MCNC: 45 MG/DL (ref 50–140)
POTASSIUM SERPL-SCNC: 2.9 MMOL/L (ref 3.5–5.1)
PROT SERPL-MCNC: 6.1 GM/DL (ref 6.4–8.3)
SODIUM SERPL-SCNC: 144 MMOL/L (ref 136–145)
TRIGL SERPL-MCNC: 188 MG/DL (ref 37–140)
VLDLC SERPL CALC-MCNC: 38 MG/DL

## 2024-01-11 PROCEDURE — 80061 LIPID PANEL: CPT

## 2024-01-11 PROCEDURE — 36415 COLL VENOUS BLD VENIPUNCTURE: CPT

## 2024-01-11 PROCEDURE — 80053 COMPREHEN METABOLIC PANEL: CPT

## 2024-01-30 DIAGNOSIS — E11.9 TYPE 2 DIABETES MELLITUS WITHOUT COMPLICATIONS: ICD-10-CM

## 2024-01-30 RX ORDER — LINAGLIPTIN 5 MG/1
5 TABLET, FILM COATED ORAL
Qty: 30 TABLET | Refills: 5 | Status: SHIPPED | OUTPATIENT
Start: 2024-01-30

## 2024-02-05 ENCOUNTER — PATIENT MESSAGE (OUTPATIENT)
Dept: ADMINISTRATIVE | Facility: HOSPITAL | Age: 59
End: 2024-02-05
Payer: COMMERCIAL

## 2024-02-27 ENCOUNTER — LAB VISIT (OUTPATIENT)
Dept: LAB | Facility: HOSPITAL | Age: 59
End: 2024-02-27
Payer: COMMERCIAL

## 2024-02-27 ENCOUNTER — OFFICE VISIT (OUTPATIENT)
Dept: FAMILY MEDICINE | Facility: CLINIC | Age: 59
End: 2024-02-27
Payer: COMMERCIAL

## 2024-02-27 ENCOUNTER — TELEPHONE (OUTPATIENT)
Dept: FAMILY MEDICINE | Facility: CLINIC | Age: 59
End: 2024-02-27

## 2024-02-27 VITALS
BODY MASS INDEX: 19.84 KG/M2 | HEART RATE: 90 BPM | WEIGHT: 116.19 LBS | HEIGHT: 64 IN | RESPIRATION RATE: 20 BRPM | OXYGEN SATURATION: 100 % | TEMPERATURE: 98 F

## 2024-02-27 DIAGNOSIS — I65.22 LEFT CAROTID ARTERY STENOSIS: Primary | ICD-10-CM

## 2024-02-27 DIAGNOSIS — E11.65 TYPE 2 DIABETES MELLITUS WITH HYPERGLYCEMIA, WITHOUT LONG-TERM CURRENT USE OF INSULIN: ICD-10-CM

## 2024-02-27 DIAGNOSIS — F32.A ANXIETY AND DEPRESSION: ICD-10-CM

## 2024-02-27 DIAGNOSIS — F41.9 ANXIETY AND DEPRESSION: ICD-10-CM

## 2024-02-27 DIAGNOSIS — E87.6 HYPOKALEMIA: Primary | ICD-10-CM

## 2024-02-27 DIAGNOSIS — E87.6 HYPOKALEMIA: ICD-10-CM

## 2024-02-27 LAB
ALBUMIN SERPL-MCNC: 3.6 G/DL (ref 3.5–5)
ALBUMIN/GLOB SERPL: 1.3 RATIO (ref 1.1–2)
ALP SERPL-CCNC: 82 UNIT/L (ref 40–150)
ALT SERPL-CCNC: 10 UNIT/L (ref 0–55)
AST SERPL-CCNC: 17 UNIT/L (ref 5–34)
BILIRUB SERPL-MCNC: 0.3 MG/DL
BUN SERPL-MCNC: 12 MG/DL (ref 9.8–20.1)
CALCIUM SERPL-MCNC: 9 MG/DL (ref 8.4–10.2)
CHLORIDE SERPL-SCNC: 104 MMOL/L (ref 98–107)
CO2 SERPL-SCNC: 27 MMOL/L (ref 22–29)
CREAT SERPL-MCNC: 0.85 MG/DL (ref 0.55–1.02)
EST. AVERAGE GLUCOSE BLD GHB EST-MCNC: 162.8 MG/DL
GFR SERPLBLD CREATININE-BSD FMLA CKD-EPI: >60 MLS/MIN/1.73/M2
GLOBULIN SER-MCNC: 2.8 GM/DL (ref 2.4–3.5)
GLUCOSE SERPL-MCNC: 191 MG/DL (ref 74–100)
HBA1C MFR BLD: 7.3 %
MAGNESIUM SERPL-MCNC: 2 MG/DL (ref 1.6–2.6)
POTASSIUM SERPL-SCNC: 3 MMOL/L (ref 3.5–5.1)
PROT SERPL-MCNC: 6.4 GM/DL (ref 6.4–8.3)
SODIUM SERPL-SCNC: 143 MMOL/L (ref 136–145)

## 2024-02-27 PROCEDURE — 83036 HEMOGLOBIN GLYCOSYLATED A1C: CPT

## 2024-02-27 PROCEDURE — 3008F BODY MASS INDEX DOCD: CPT | Mod: CPTII,,,

## 2024-02-27 PROCEDURE — 83735 ASSAY OF MAGNESIUM: CPT

## 2024-02-27 PROCEDURE — 3051F HG A1C>EQUAL 7.0%<8.0%: CPT | Mod: CPTII,,,

## 2024-02-27 PROCEDURE — 1159F MED LIST DOCD IN RCRD: CPT | Mod: CPTII,,,

## 2024-02-27 PROCEDURE — 4010F ACE/ARB THERAPY RXD/TAKEN: CPT | Mod: CPTII,,,

## 2024-02-27 PROCEDURE — 99214 OFFICE O/P EST MOD 30 MIN: CPT | Mod: ,,,

## 2024-02-27 PROCEDURE — 36415 COLL VENOUS BLD VENIPUNCTURE: CPT

## 2024-02-27 PROCEDURE — 80053 COMPREHEN METABOLIC PANEL: CPT

## 2024-02-27 RX ORDER — CITALOPRAM 20 MG/1
30 TABLET, FILM COATED ORAL DAILY
Qty: 45 TABLET | Refills: 0 | Status: SHIPPED | OUTPATIENT
Start: 2024-02-27 | End: 2024-02-28

## 2024-02-27 RX ORDER — PIOGLITAZONEHYDROCHLORIDE 15 MG/1
15 TABLET ORAL DAILY
Qty: 30 TABLET | Refills: 5 | Status: SHIPPED | OUTPATIENT
Start: 2024-02-27 | End: 2024-08-25

## 2024-02-27 RX ORDER — POTASSIUM CHLORIDE 1.5 G/1.58G
20 POWDER, FOR SOLUTION ORAL 2 TIMES DAILY
Qty: 8 PACKET | Refills: 0 | Status: SHIPPED | OUTPATIENT
Start: 2024-02-27 | End: 2024-03-26

## 2024-02-27 RX ORDER — CITALOPRAM 20 MG/1
30 TABLET, FILM COATED ORAL DAILY
Qty: 45 TABLET | Refills: 0 | Status: SHIPPED | OUTPATIENT
Start: 2024-02-27 | End: 2024-02-27 | Stop reason: SDUPTHER

## 2024-02-27 RX ORDER — CLOPIDOGREL BISULFATE 75 MG/1
75 TABLET ORAL
Qty: 30 TABLET | Refills: 4 | Status: SHIPPED | OUTPATIENT
Start: 2024-02-27

## 2024-02-27 NOTE — ASSESSMENT & PLAN NOTE
Dr. Roldan came to assess the patient as well. We discussed the risks of increasing Xanax without trying other measures first. Benzodiazepines can lead to addiction.   Increase Celexa to 30 mg daily.    Practice deep breathing or abdominal breathing exercises when anxiety occurs.  Exercise daily. Get sunlight daily.  Avoid caffeine, alcohol and stimulants.  Practice positive phrases and repeat throughout the day, along with yoga and relaxation techniques.  Set healthy boundaries, avoid people and conversations that increase stress.  Educated patient on the risks of serotonin based medications such as serotonin modulators and SSRIs/SNRIs including common side effects of nausea, GI upset, headache dizziness as well as the rare risk for worsening symptoms of depression including development of suicidal thoughts or ideations, and serotonin syndrome.   Reports any symptoms of suicidal or homicidal ideations immediately, if clinic is closed go to nearest emergency room.

## 2024-02-27 NOTE — ASSESSMENT & PLAN NOTE
Discussed with patient that we will recheck potassium levels are today as the patient has a history of hypokalemia. If the patient continues to experience hypokalemia, it may result in reduced insulin production, leading to hyperglycemia.

## 2024-02-27 NOTE — PROGRESS NOTES
Patient ID: [id number]     Chief Complaint: Diabetes (3 month check up/Stopped Trulicity last week due to no appetite and nausea)      HPI:     Jocelyne Dickinson is a 59 y.o. female here today for a DM II follow up. The patient reports that she stopped taking Trulicity last week. She was experiencing decreased appetite while taking the medication. When she would eat with the medication, she was nauseous. She does not check her blood sugars. She also reports that her anxiety has increased and that her Xanax 0.5 mg BID is not working anymore. She is requesting an increase in her Xanax.     Past Medical History:   Diagnosis Date    Anxiety disorder, unspecified     Carotid artery stenosis     Cellulitis of scalp     COPD (chronic obstructive pulmonary disease)     Diabetes mellitus     Emphysema, unspecified     HTN (hypertension)     Mixed hyperlipidemia     Neuropathy     Osteoarthritis     Personal history of nicotine dependence     Pneumonia, unspecified organism     Sleep apnea, unspecified     Syncope         Past Surgical History:   Procedure Laterality Date    CATARACT EXTRACTION      COLONOSCOPY  2021    Dr. Nila Sharma    REPAIR OF CAROTID ARTERY      TONSILLECTOMY          Social History     Socioeconomic History    Marital status: Single   Tobacco Use    Smoking status: Every Day     Current packs/day: 0.00     Average packs/day: 1.5 packs/day for 40.0 years (60.0 ttl pk-yrs)     Types: Vaping with nicotine, Cigarettes     Start date:      Last attempt to quit: 2016     Years since quittin.1    Smokeless tobacco: Never   Substance and Sexual Activity    Alcohol use: Not Currently    Drug use: Never    Sexual activity: Not Currently     Social Determinants of Health     Financial Resource Strain: Medium Risk (2023)    Overall Financial Resource Strain (CARDIA)     Difficulty of Paying Living Expenses: Somewhat hard   Food Insecurity: No Food Insecurity (2024)    Hunger Vital Sign      Worried About Running Out of Food in the Last Year: Never true     Ran Out of Food in the Last Year: Never true   Transportation Needs: No Transportation Needs (2023)    PRAPARE - Transportation     Lack of Transportation (Medical): No     Lack of Transportation (Non-Medical): No   Physical Activity: Unknown (2023)    Exercise Vital Sign     Days of Exercise per Week: Patient declined     Minutes of Exercise per Session: 10 min   Stress: Stress Concern Present (2023)    [employer] - Occupational Stress Questionnaire     Feeling of Stress : To some extent   Social Connections: Moderately Isolated (2023)    Social Connection and Isolation Panel [NHANES]     Frequency of Communication with Friends and Family: More than three times a week     Frequency of Social Gatherings with Friends and Family: More than three times a week     Attends Jehovah's witness Services: More than 4 times per year     Active Member of Clubs or Organizations: No     Attends Club or Organization Meetings: Patient declined     Marital Status:    Housing Stability: Low Risk  (2023)    Housing Stability Vital Sign     Unable to Pay for Housing in the Last Year: No     Number of Places Lived in the Last Year: 1     Unstable Housing in the Last Year: No        Current Outpatient Medications   Medication Instructions    albuterol (PROVENTIL/VENTOLIN HFA) 90 mcg/actuation inhaler inhale TWO puffs EVERY 4 TO 6 HOURS AS NEEDED FOR wheezing    ALPRAZolam (XANAX) 0.5 mg, Oral, 2 times daily    atorvastatin (LIPITOR) 40 mg, Oral    azelastine (ASTELIN) 137 mcg (0.1 %) nasal spray SMARTSI-2 Spray(s) Both Nares Twice Daily    citalopram (CELEXA) 30 mg, Oral, Daily    clopidogreL (PLAVIX) 75 mg, Oral, Daily    esomeprazole (NEXIUM) 40 mg, Oral, Before breakfast    fluticasone propionate (FLONASE) 50 mcg/actuation nasal spray USE 1 SPRAY IN EACH NOSTRIL DAILY    gabapentin (NEURONTIN) 300 mg, Oral,  Nightly    glimepiride (AMARYL) 2 mg, Oral, 2 times daily, for diabetes.    hydrocortisone 2.5 % cream SMARTSIG:sparingly Topical 3 Times Daily    hydrOXYzine pamoate (VISTARIL) 25 MG Cap TAKE ONE CAPSULE BY MOUTH DAILY     mg tablet TAKE ONE TABLET BY MOUTH THREE TIMES DAILY WITH FOOD AS NEEDED FOR PAIN    losartan (COZAAR) 100 MG tablet TAKE 1 TABLET BY MOUTH DAILY FOR BLOOD PRESSURE    mirtazapine (REMERON) 7.5 mg, Oral, Nightly    NIFEdipine (PROCARDIA-XL) 60 MG (OSM) 24 hr tablet TAKE ONE TABLET BY MOUTH DAILY FOR BLOOD PRESSURE    omeprazole (PRILOSEC) 40 MG capsule TAKE ONE CAPSULE BY MOUTH DAILY for stomach    SPIRIVA WITH HANDIHALER 18 mcg inhalation capsule 1 capsule, Inhalation, Daily    sucralfate (CARAFATE) 1 g, Oral, 4 times daily    SYMBICORT 160-4.5 mcg/actuation HFAA INHALE 2 PUFFS TWICE A DAY    TRADJENTA 5 mg, Oral       Review of patient's allergies indicates:   Allergen Reactions    Aller ext-american cockroach Itching and Other (See Comments)    Allerg ext-tree poll-red maple Itching and Other (See Comments)    Cat hair standardized allergenic extract Itching and Other (See Comments)    Dog hair standardized allergenic extract Itching and Other (See Comments)    Grass pollen-ruslan, standard Itching and Other (See Comments)    Horse dander Itching and Other (See Comments)    Tree pollen-box elder Itching and Other (See Comments)    Tree pollen-pecan Itching and Other (See Comments)        Patient Care Team:  Edi Roldan DO as PCP - General (Family Medicine)  Drew Gardiner MD as Consulting Physician (Otolaryngology)  Ricky Foster MD as Consulting Physician (Pulmonary Disease)  Solomon Kolb MD as Consulting Physician (Cardiology)  Diego Atkinson MD as Consulting Physician (Gastroenterology)     Subjective:     Review of Systems    12 point review of systems conducted, negative except as stated in the history of present illness. See HPI for details.    Objective:  "    Visit Vitals  Pulse 90   Temp 98 °F (36.7 °C)   Resp 20   Ht 5' 4" (1.626 m)   Wt 52.7 kg (116 lb 3.2 oz)   SpO2 100%   BMI 19.95 kg/m²       Physical Exam  Vitals and nursing note reviewed.   Constitutional:       General: She is not in acute distress.     Appearance: She is not ill-appearing.   HENT:      Head: Normocephalic and atraumatic.      Mouth/Throat:      Mouth: Mucous membranes are moist.      Pharynx: Oropharynx is clear.   Eyes:      General: No scleral icterus.     Extraocular Movements: Extraocular movements intact.      Conjunctiva/sclera: Conjunctivae normal.      Pupils: Pupils are equal, round, and reactive to light.   Neck:      Vascular: No carotid bruit.   Cardiovascular:      Rate and Rhythm: Normal rate and regular rhythm.      Heart sounds: No murmur heard.     No friction rub. No gallop.   Pulmonary:      Effort: Pulmonary effort is normal. No respiratory distress.      Breath sounds: Normal breath sounds. No wheezing, rhonchi or rales.   Abdominal:      General: Abdomen is flat. Bowel sounds are normal. There is no distension.      Palpations: Abdomen is soft. There is no mass.      Tenderness: There is no abdominal tenderness.   Musculoskeletal:         General: Normal range of motion.      Cervical back: Normal range of motion and neck supple.   Skin:     General: Skin is warm and dry.   Neurological:      General: No focal deficit present.      Mental Status: She is alert.   Psychiatric:         Mood and Affect: Mood is anxious.         Behavior: Behavior is agitated.         Labs Reviewed:     Chemistry:  Lab Results   Component Value Date     01/11/2024    K 2.9 (L) 01/11/2024    CHLORIDE 106 01/11/2024    BUN 11.0 01/11/2024    CREATININE 0.83 01/11/2024    EGFRNORACEVR >60 01/11/2024    GLUCOSE 181 (H) 01/11/2024    CALCIUM 8.8 01/11/2024    ALKPHOS 68 01/11/2024    LABPROT 6.1 (L) 01/11/2024    ALBUMIN 3.8 01/11/2024    BILIDIR 0.3 04/30/2022    IBILI 0.30 04/30/2022    " AST 21 01/11/2024    ALT 18 01/11/2024    QPDHDRWP21GI 29.3 (L) 10/17/2022    TSH 2.049 08/25/2023    XAVXVQ0GILC 0.95 08/25/2023        Lab Results   Component Value Date    HGBA1C 7.3 (H) 02/27/2024        Hematology:  Lab Results   Component Value Date    WBC 10.43 12/16/2023    HGB 15.5 12/16/2023    HCT 44.9 12/16/2023     12/16/2023       Lipid Panel:  Lab Results   Component Value Date    CHOL 129 01/11/2024    HDL 46 01/11/2024    LDL 45.00 (L) 01/11/2024    TRIG 188 (H) 01/11/2024    TOTALCHOLEST 3 01/11/2024        Urine:  Lab Results   Component Value Date    COLORUA Yellow 12/16/2023    APPEARANCEUA Clear 12/16/2023    SGUA 1.025 12/16/2023    PHUA 6.0 12/16/2023    PROTEINUA 1+ (A) 12/16/2023    GLUCOSEUA 1+ (A) 12/16/2023    KETONESUA 2+ (A) 12/16/2023    BLOODUA Trace (A) 12/16/2023    NITRITESUA Negative 12/16/2023    LEUKOCYTESUR Negative 12/16/2023    RBCUA 3-5 12/16/2023    WBCUA 0-2 12/16/2023    BACTERIA Few (A) 12/16/2023    CREATRANDUR 134.2 (H) 05/24/2023        Assessment:       ICD-10-CM ICD-9-CM   1. Hypokalemia  E87.6 276.8   2. Anxiety and depression  F41.9 300.00    F32.A 311   3. Type 2 diabetes mellitus with hyperglycemia, without long-term current use of insulin  E11.65 250.00     790.29        Plan:     1. Hypokalemia  Assessment & Plan:  Discussed with patient that we will recheck potassium levels are today as the patient has a history of hypokalemia. If the patient continues to experience hypokalemia, it may result in reduced insulin production, leading to hyperglycemia.    Orders:  -     Comprehensive Metabolic Panel; Future; Expected date: 02/27/2024  -     Magnesium; Future; Expected date: 02/27/2024    2. Anxiety and depression  Assessment & Plan:  Dr. Roldan came to assess the patient as well. We discussed the risks of increasing Xanax without trying other measures first. Benzodiazepines can lead to addiction.   Increase Celexa to 30 mg daily.    Practice deep  breathing or abdominal breathing exercises when anxiety occurs.  Exercise daily. Get sunlight daily.  Avoid caffeine, alcohol and stimulants.  Practice positive phrases and repeat throughout the day, along with yoga and relaxation techniques.  Set healthy boundaries, avoid people and conversations that increase stress.  Educated patient on the risks of serotonin based medications such as serotonin modulators and SSRIs/SNRIs including common side effects of nausea, GI upset, headache dizziness as well as the rare risk for worsening symptoms of depression including development of suicidal thoughts or ideations, and serotonin syndrome.   Reports any symptoms of suicidal or homicidal ideations immediately, if clinic is closed go to nearest emergency room.    Orders:  -     citalopram (CELEXA) 20 MG tablet; Take 1.5 tablets (30 mg total) by mouth once daily.  Dispense: 45 tablet; Refill: 0    3. Type 2 diabetes mellitus with hyperglycemia, without long-term current use of insulin  Assessment & Plan:  Will get A1C today.   The patient has intolerance to Trulicity reporting decreased appetite and nausea. There is concern for uncontrolled blood sugars.     Orders:  -     Hemoglobin A1C; Future; Expected date: 02/27/2024      Follow up in about 2 months (around 4/27/2024) for Follow Up. In addition to their scheduled follow up, the patient has also been instructed to follow up on as needed basis.     Future Appointments   Date Time Provider Department Center   3/20/2024 10:30 AM Miguelito العلي MD [facility] [address] [facility]   4/29/2024  8:40 AM Chriss Jean Baptiste NP [facility]        Chriss Jean Baptiste NP

## 2024-02-27 NOTE — TELEPHONE ENCOUNTER
----- Message from Chriss Jean Baptiste NP sent at 2/27/2024 11:56 AM CST -----  Please inform patient of lab results.     1. A1C has improved to 7.3. The patient is almost to goal. Stop Trulicity and start Actos 15 mg daily.     2. Potassium is low. 3.0. Start Potassium 20 mEq pack BID x 4 days. Repeat CMP on 3/4/24.     Thanks for all you do,   Chriss

## 2024-02-27 NOTE — ASSESSMENT & PLAN NOTE
Will get A1C today.   The patient has intolerance to Trulicity reporting decreased appetite and nausea. There is concern for uncontrolled blood sugars.

## 2024-02-28 RX ORDER — CITALOPRAM 20 MG/1
TABLET, FILM COATED ORAL
Qty: 30 TABLET | Refills: 0 | Status: SHIPPED | OUTPATIENT
Start: 2024-02-28

## 2024-02-28 RX ORDER — CITALOPRAM 10 MG/1
TABLET ORAL
Qty: 30 TABLET | Refills: 0 | Status: SHIPPED | OUTPATIENT
Start: 2024-02-28 | End: 2024-05-06

## 2024-03-06 ENCOUNTER — ANESTHESIA EVENT (OUTPATIENT)
Dept: SURGERY | Facility: HOSPITAL | Age: 59
End: 2024-03-06
Payer: COMMERCIAL

## 2024-03-11 NOTE — PRE-PROCEDURE INSTRUCTIONS
"[facility]: Outpatient Surgery  Preprocedure Instructions    Expectations: "Because of inconsistent procedure completion times, an unexpected wait may occur. The physicians would like you to be here to prepare in the event they run ahead of time. We will make you as comfortable as possible and keep you informed. We apologize in advance if this happens."     Your arrival time for your surgery or procedure is 6:30 am.  We ask patients to arrive about 2 hours before surgery to allow for enough time to review your health history & medications, start your IV, complete any outstanding labwork or tests, and meet your Anesthesiologist.    We are located at [facility], [address] [place] [address].    Enter through the [place] entrance next to the Emergency Room, and come to the 6th floor to the Outpatient Surgery Department.    If you are in need of a wheelchair the morning of surgery please call [phone] about 15 minutes before you arrive. Parking is available in our parking garage located off [facility], between the hospital and [facility].      Visitory Policy:  You are allowed 2 adult visitors to be with you in the hospital. Please, no switching visitors in pre-op area. All hospital visitors should be in good current health.  No small children.  We will update you and your family hourly on the progression of surgery and any unexpected delays.      What to Bring:  Please have your ID, insurance cards, and all home medication bottles with you at check in.  Bring your CPAP machine if one is used at home.     Fasting:  Nothing to eat or drink after midnight the night before your procedure. This includes no ice, gum, hard candies, and/or tobacco products.    Medications:  Follow your doctor's instructions for taking any medications on the morning of your procedure.  If no instructions for taking medications were given, do not take any medications but bring your " medications in their bottles to your procedure check in.     Follow your doctor's preoperative instructions regarding skin prep, bowel prep, bathing, or showering prior to your procedure.  If any special soaps were provided to you, please use according to your doctor's instructions. If no instructions were given from your doctor, take a good bath or shower with antibacterial soap the night before and the morning of your procedure.  On the morning of procedure, wear loose, comfortable clothing.  No lotions, makeup, perfumes, colognes, deodorant, or jewelry to your procedure.  Removable items (glasses, contact lenses, dentures, retainers, hearing aids) need to be removed for your procedure.  Bring your storage containers for these items if you wear them.     Artificial nails, body jewelry, eyelash extensions, and/or hair extensions with metal clips are not allowed during your surgery.  If you currently wear any of these items, please arrange for them to be removed prior to your arrival to the hospital.     Outpatient or Same Day Surgeries:  Any patients receiving sedation/anesthesia are advised not to drive for 24 hours after their procedure.  We do not allow patients to drive themselves home after discharge.  If you are going home after your procedure, please have someone available to drive you home from the hospital.     You may call the Outpatient Surgery Department at [phone] with any questions or concerns.  We are looking forward to meeting you and taking great care of you for your procedure.  Thank you for choosing [facility] for your surgical needs.

## 2024-03-12 ENCOUNTER — HOSPITAL ENCOUNTER (OUTPATIENT)
Facility: HOSPITAL | Age: 59
Discharge: HOME | End: 2024-03-12
Attending: INTERNAL MEDICINE | Admitting: INTERNAL MEDICINE
Payer: COMMERCIAL

## 2024-03-12 ENCOUNTER — ANESTHESIA (OUTPATIENT)
Dept: SURGERY | Facility: HOSPITAL | Age: 59
End: 2024-03-12
Payer: COMMERCIAL

## 2024-03-12 DIAGNOSIS — R93.3 GASTROINTESTINAL TRACT IMAGING ABNORMALITY: ICD-10-CM

## 2024-03-12 DIAGNOSIS — K86.9 DISORDER OF PANCREATIC DUCT: ICD-10-CM

## 2024-03-12 DIAGNOSIS — K86.9 DISORDER OF PANCREAS: ICD-10-CM

## 2024-03-12 LAB — POCT GLUCOSE: 143 MG/DL (ref 70–110)

## 2024-03-12 PROCEDURE — 37000009 HC ANESTHESIA EA ADD 15 MINS: Performed by: INTERNAL MEDICINE

## 2024-03-12 PROCEDURE — 37000008 HC ANESTHESIA 1ST 15 MINUTES: Performed by: INTERNAL MEDICINE

## 2024-03-12 PROCEDURE — 43239 EGD BIOPSY SINGLE/MULTIPLE: CPT | Mod: 59 | Performed by: INTERNAL MEDICINE

## 2024-03-12 PROCEDURE — D9220A PRA ANESTHESIA: Mod: ANES,,, | Performed by: ANESTHESIOLOGY

## 2024-03-12 PROCEDURE — 25000003 PHARM REV CODE 250: Performed by: NURSE ANESTHETIST, CERTIFIED REGISTERED

## 2024-03-12 PROCEDURE — C1726 CATH, BAL DIL, NON-VASCULAR: HCPCS | Performed by: INTERNAL MEDICINE

## 2024-03-12 PROCEDURE — 27201423 OPTIME MED/SURG SUP & DEVICES STERILE SUPPLY: Performed by: INTERNAL MEDICINE

## 2024-03-12 PROCEDURE — D9220A PRA ANESTHESIA: Mod: CRNA,,, | Performed by: NURSE ANESTHETIST, CERTIFIED REGISTERED

## 2024-03-12 PROCEDURE — 63600175 PHARM REV CODE 636 W HCPCS: Performed by: NURSE ANESTHETIST, CERTIFIED REGISTERED

## 2024-03-12 PROCEDURE — 43259 EGD US EXAM DUODENUM/JEJUNUM: CPT | Performed by: INTERNAL MEDICINE

## 2024-03-12 RX ORDER — SODIUM CHLORIDE, SODIUM GLUCONATE, SODIUM ACETATE, POTASSIUM CHLORIDE AND MAGNESIUM CHLORIDE 30; 37; 368; 526; 502 MG/100ML; MG/100ML; MG/100ML; MG/100ML; MG/100ML
INJECTION, SOLUTION INTRAVENOUS CONTINUOUS
Status: CANCELLED | OUTPATIENT
Start: 2024-03-12 | End: 2024-04-11

## 2024-03-12 RX ORDER — LIDOCAINE HYDROCHLORIDE 10 MG/ML
1 INJECTION, SOLUTION EPIDURAL; INFILTRATION; INTRACAUDAL; PERINEURAL ONCE
Status: CANCELLED | OUTPATIENT
Start: 2024-03-12 | End: 2024-03-12

## 2024-03-12 RX ORDER — LIDOCAINE HYDROCHLORIDE 20 MG/ML
INJECTION INTRAVENOUS
Status: DISCONTINUED | OUTPATIENT
Start: 2024-03-12 | End: 2024-03-12

## 2024-03-12 RX ORDER — ONDANSETRON HYDROCHLORIDE 2 MG/ML
INJECTION, SOLUTION INTRAVENOUS
Status: DISCONTINUED | OUTPATIENT
Start: 2024-03-12 | End: 2024-03-12

## 2024-03-12 RX ORDER — PROPOFOL 10 MG/ML
VIAL (ML) INTRAVENOUS
Status: DISCONTINUED | OUTPATIENT
Start: 2024-03-12 | End: 2024-03-12

## 2024-03-12 RX ORDER — DEXAMETHASONE SODIUM PHOSPHATE 4 MG/ML
INJECTION, SOLUTION INTRA-ARTICULAR; INTRALESIONAL; INTRAMUSCULAR; INTRAVENOUS; SOFT TISSUE
Status: DISCONTINUED | OUTPATIENT
Start: 2024-03-12 | End: 2024-03-12

## 2024-03-12 RX ADMIN — ONDANSETRON 4 MG: 2 INJECTION INTRAMUSCULAR; INTRAVENOUS at 08:03

## 2024-03-12 RX ADMIN — LIDOCAINE HYDROCHLORIDE 80 MG: 20 INJECTION INTRAVENOUS at 08:03

## 2024-03-12 RX ADMIN — DEXAMETHASONE SODIUM PHOSPHATE 4 MG: 4 INJECTION, SOLUTION INTRA-ARTICULAR; INTRALESIONAL; INTRAMUSCULAR; INTRAVENOUS; SOFT TISSUE at 08:03

## 2024-03-12 RX ADMIN — SODIUM CHLORIDE, SODIUM GLUCONATE, SODIUM ACETATE, POTASSIUM CHLORIDE AND MAGNESIUM CHLORIDE: 526; 502; 368; 37; 30 INJECTION, SOLUTION INTRAVENOUS at 08:03

## 2024-03-12 RX ADMIN — PROPOFOL 100 MCG/KG/MIN: 10 INJECTION, EMULSION INTRAVENOUS at 08:03

## 2024-03-12 RX ADMIN — PROPOFOL 60 MG: 10 INJECTION, EMULSION INTRAVENOUS at 08:03

## 2024-03-12 NOTE — TRANSFER OF CARE
"Anesthesia Transfer of Care Note    Patient: Jocelyne Dickinson    Procedure(s) Performed: Procedure(s) (LRB):  UPPER EUS W/ POSS FNA (N/A)  EGD, WITH CLOSED BIOPSY    Patient location: [facility]    Anesthesia Type: general    Transport from OR: Transported from OR on room air with adequate spontaneous ventilation    Post pain: adequate analgesia    Post assessment: no apparent anesthetic complications and tolerated procedure well    Post vital signs: stable    Level of consciousness: awake and alert    Nausea/Vomiting: no nausea/vomiting    Complications: none    Transfer of care protocol was followed      Last vitals: Visit Vitals  BP (!) 103/53   Pulse 97   Temp 36.3 °C (97.3 °F) (Oral)   Resp 17   Ht 5' 5" (1.651 m)   Wt 54 kg (119 lb)   SpO2 100%   Breastfeeding No   BMI 19.80 kg/m²     "

## 2024-03-12 NOTE — [employer]ATION PATIENT INSTRUCTIONS
Discharge Summary/Instructions after an Endoscopic Procedure  Patient Name: Jocelyne Dickinson  Patient MRN: [id number]  Patient YOB: 1965 Tuesday, March 12, 2024  Diego Atkinson MD  Dear patient,  As a result of recent federal legislation (The Federal Cures Act), you may   receive lab or pathology results from your procedure in your [facility]   account before your physician is able to contact you. Your physician or   their representative will relay the results to you with their   recommendations at their soonest availability.  Thank you,  RESTRICTIONS:  During your procedure today, you received medications for sedation.  These   medications may affect your judgment, balance and coordination.  Therefore,   for 24 hours, you have the following restrictions:   - DO NOT drive a car, operate machinery, make legal/financial decisions,   sign important papers or drink alcohol.    ACTIVITY:  Today: no heavy lifting, straining or running due to procedural   sedation/anesthesia.  The following day: return to full activity including work.  DIET:  Eat and drink normally unless instructed otherwise.     TREATMENT FOR COMMON SIDE EFFECTS:  - Mild abdominal pain, nausea, belching, bloating or excessive gas:  rest,   eat lightly and use a heating pad.  - Sore Throat: treat with throat lozenges and/or gargle with warm salt   water.  - Because air was used during the procedure, expelling large amounts of air   from your rectum or belching is normal.  - If a bowel prep was taken, you may not have a bowel movement for 1-3 days.    This is normal.  SYMPTOMS TO WATCH FOR AND REPORT TO YOUR PHYSICIAN:  1. Abdominal pain or bloating, other than gas cramps.  2. Chest pain.  3. Back pain.  4. Signs of infection such as: chills or fever occurring within 24 hours   after the procedure.  5. Rectal bleeding, which would show as bright red, maroon, or black stools.   (A tablespoon of blood from the rectum is not serious, especially  if   hemorrhoids are present.)  6. Vomiting.  7. Weakness or dizziness.  GO DIRECTLY TO THE NEAREST EMERGENCY ROOM IF YOU HAVE ANY OF THE FOLLOWING:      Difficulty breathing              Chills and/or fever over 101 F   Persistent vomiting and/or vomiting blood   Severe abdominal pain   Severe chest pain   Black, tarry stools   Bleeding- more than one tablespoon   Any other symptom or condition that you feel may need urgent attention  Your doctor recommends these additional instructions:  If any biopsies were taken, your doctors clinic will contact you in 1 to 2   weeks with any results.  - Discharge patient to home (with escort).   - Resume previous diet today.   - Continue present medications.   - Resume Plavix (clopidogrel) at prior dose today.  Refer to managing   physician for further adjustment of therapy.   - Observe patient's clinical course.   - Await path results.   - Return to physician assistant in 2 months; Need to assess pt for   pancreatic insufficiency and consider initiation of pancreatic enzyme   therapy if needed.   - The findings and recommendations were discussed with the patient and their   family.  For questions, problems or results please call your physician - Diego Atkinson MD at Work:  [phone].  [facility], EMERGENCY ROOM PHONE NUMBER: [phone]  IF A COMPLICATION OR EMERGENCY SITUATION ARISES AND YOU ARE UNABLE TO REACH   YOUR PHYSICIAN - GO DIRECTLY TO THE EMERGENCY ROOM.  Diego Atkinson MD  3/12/2024 2:49:47 PM  This report has been verified and signed electronically.  Dear patient,  As a result of recent federal legislation (The Federal Cures Act), you may   receive lab or pathology results from your procedure in your [facility]   account before your physician is able to contact you. Your physician or   their representative will relay the results to you with their   recommendations at their soonest availability.  Thank you,  [employer]ATION

## 2024-03-12 NOTE — DISCHARGE INSTRUCTIONS
"NO driving and NO alcohol consumption for 24 hours or while taking any narcotic pain medication.    BLEEDING: If you experience black tarry stools, "coffee-ground" vomit or bright red blood in stool or vomit, report to ER immediately.    NAUSEA: due to the anesthesia, you may experience nausea for up to 24 hours. If nausea and vomiting last longer, contact your doctor.     INFECTION: watch for any signs or symptoms of infection such as chills, fever, redness or drainage at surgical site (if one was made). Notify your doctor.    PAIN/discomfort: Sore throat and/or mild cough is expected. Hard candies, peppermints, throat lozenges, gargling with warm water and salt may help.   You may also experience burping and gas pains. Use heating pads as needed for gas discomfort.     -Report to your nearest ER and/notify your doctor if you experience any SUDDEN/SEVERE chest/abdominal pain, weakness, trouble breathing, uncontrolled pain, vomiting/coughing up blood.     "

## 2024-03-12 NOTE — H&P
Endoscopy History and Physical    PCP - Edi Roldan DO    Procedure - JORJE  ASA & Mallampati - per anesthesia      HPI:  This is a 59 y.o. female here for evaluation of dilated PD in the TOP and wt loss. Significant hx of ETOH & Smoking in the past for years. Assess for chronic pancreatitis.       ROS:  Constitutional: No fevers, chills, No weight loss  ENT: No allergies  CV: No chest pain  Pulm: No shortness of breath  GI: see HPI  Derm: No rash    Medical History:  has a past medical history of Abdominal pain, Anxiety disorder, unspecified, Carotid artery stenosis, Cellulitis of scalp, COPD (chronic obstructive pulmonary disease), Diabetes mellitus, Digestive disorder, Disorder of pancreas, Disorder of pancreatic duct, Emphysema, unspecified, Fatigue, Gastrointestinal tract imaging abnormality, HTN (hypertension), Mixed hyperlipidemia, Neuropathy, Osteoarthritis, Personal history of nicotine dependence, Pneumonia, unspecified organism, Shortness of breath, Syncope, and Weight loss.    Surgical History:  has a past surgical history that includes Repair of carotid artery; Cataract extraction; Tonsillectomy; Colonoscopy (07/02/2021); and Wrist surgery (Bilateral).    Family History: family history includes Cancer in her father and maternal grandmother; Heart disease in her father; Hypertension in her father and mother; Stroke in her father; Vision loss in her maternal grandmother.     Social History:  reports that she has been smoking vaping with nicotine. She started smoking about 8 years ago. She has never used smokeless tobacco. She reports that she does not currently use alcohol. She reports that she does not use drugs.    Review of patient's allergies indicates:   Allergen Reactions    Aller ext-american cockroach Itching and Other (See Comments)    Allerg ext-tree poll-red maple Itching and Other (See Comments)    Cat hair standardized allergenic extract Itching and Other (See Comments)    Dog hair  standardized allergenic extract Itching and Other (See Comments)    Grass pollen-ruslan, standard Itching and Other (See Comments)    Horse dander Itching and Other (See Comments)    Tree pollen-box elder Itching and Other (See Comments)    Tree pollen-pecan Itching and Other (See Comments)       Medications:   Medications Prior to Admission   Medication Sig Dispense Refill Last Dose    albuterol (PROVENTIL/VENTOLIN HFA) 90 mcg/actuation inhaler inhale TWO puffs EVERY 4 TO 6 HOURS AS NEEDED FOR wheezing 6.7 g 6     ALPRAZolam (XANAX) 0.5 MG tablet Take 1 tablet (0.5 mg total) by mouth 2 (two) times daily. 60 tablet 5     atorvastatin (LIPITOR) 40 MG tablet Take 40 mg by mouth once daily.       azelastine (ASTELIN) 137 mcg (0.1 %) nasal spray SMARTSI-2 Spray(s) Both Nares Twice Daily       citalopram (CELEXA) 10 MG tablet TAKE ONE TABLET BY MOUTH EVERY DAY WITH 20 MG 30 tablet 0     citalopram (CELEXA) 20 MG tablet TAKE ONE TABLET BY MOUTH EVERY DAY WITH 10 MG 30 tablet 0     clopidogreL (PLAVIX) 75 mg tablet TAKE ONE TABLET BY MOUTH DAILY 30 tablet 4 3/5/2024    esomeprazole (NEXIUM) 40 MG capsule Take 1 capsule (40 mg total) by mouth before breakfast. 30 capsule 11     gabapentin (NEURONTIN) 300 MG capsule Take 1 capsule (300 mg total) by mouth every evening. 30 capsule 11     glimepiride (AMARYL) 2 MG tablet TAKE ONE TABLET BY MOUTH TWICE DAILY FOR DIABETES 180 tablet 3     hydrOXYzine pamoate (VISTARIL) 25 MG Cap TAKE ONE CAPSULE BY MOUTH DAILY 30 capsule 5     mirtazapine (REMERON) 7.5 MG Tab Take 1 tablet (7.5 mg total) by mouth every evening. 30 tablet 11     NIFEdipine (PROCARDIA-XL) 60 MG (OSM) 24 hr tablet TAKE ONE TABLET BY MOUTH DAILY FOR BLOOD PRESSURE 30 tablet 5     pioglitazone (ACTOS) 15 MG tablet Take 1 tablet (15 mg total) by mouth once daily. 30 tablet 5     SPIRIVA WITH HANDIHALER 18 mcg inhalation capsule Inhale 1 capsule into the lungs Daily.       SYMBICORT 160-4.5 mcg/actuation HFAA  INHALE 2 PUFFS TWICE A DAY 10.2 g 6     TRADJENTA 5 mg Tab tablet TAKE ONE TABLET BY MOUTH DAILY 30 tablet 5     fluticasone propionate (FLONASE) 50 mcg/actuation nasal spray USE 1 SPRAY IN EACH NOSTRIL DAILY 16 g 4     hydrocortisone 2.5 % cream SMARTSIG:sparingly Topical 3 Times Daily        mg tablet TAKE ONE TABLET BY MOUTH THREE TIMES DAILY WITH FOOD AS NEEDED FOR PAIN 90 tablet 5     losartan (COZAAR) 100 MG tablet TAKE 1 TABLET BY MOUTH DAILY FOR BLOOD PRESSURE 30 tablet 5     omeprazole (PRILOSEC) 40 MG capsule TAKE ONE CAPSULE BY MOUTH DAILY for stomach 30 capsule 5     sucralfate (CARAFATE) 100 mg/mL suspension Take 10 mLs (1 g total) by mouth 4 (four) times daily. (Patient taking differently: Take 1 g by mouth as needed.) 414 mL 5          Objective Findings:    Vital Signs: see nursing notes  Physical Exam:  General Appearance: WF ,well appearing in no acute distress  Neuro: A&O x 3, no focal deficits  Eyes:    No scleral icterus  ENT: Neck supple  Lungs: CTA anteriorly  Heart:  S1, S2 normal, no murmurs heard  Abdomen: Soft, non tender, non distended with positive bowel sounds. No hepatosplenomegaly, ascites, or mass  Extremities: no edema  Skin: No rash      Labs:  Lab Results   Component Value Date    WBC 10.43 12/16/2023    HGB 15.5 12/16/2023    HCT 44.9 12/16/2023     12/16/2023    CHOL 129 01/11/2024    TRIG 188 (H) 01/11/2024    HDL 46 01/11/2024    ALT 10 02/27/2024    AST 17 02/27/2024     02/27/2024    K 3.0 (L) 02/27/2024     04/21/2021    CREATININE 0.85 02/27/2024    BUN 12.0 02/27/2024    CO2 27 02/27/2024    TSH 2.049 08/25/2023    HGBA1C 7.3 (H) 02/27/2024       I have explained the risks and benefits of endoscopy procedures to the patient including but not limited to bleeding, perforation, infection, and death.    Diego Atkinson MD

## 2024-03-12 NOTE — ANESTHESIA PREPROCEDURE EVALUATION
03/12/2024  Jocelyne Dickinson is a 59 y.o., female presents with GI tract imaging abnormality, Pancreatic duct/Pancreatic disorder.  Diagnosis:      Gastrointestinal tract imaging abnormality [R93.3]      Disorder of pancreas [K86.9]      Disorder of pancreatic duct [K86.9]   Pre-op diagnosis:      Gastrointestinal tract imaging abnormality [R93.3]      Disorder of pancreas [K86.9]      Disorder of pancreatic duct [K86.9]       The pt. comes to [facility] endoscopy for the noted procedure under GA/TIVA.  Procedures:      UPPER EUS W/ POSS FNA (Abdomen) - MRI abd- Limited exam due to image quality. Overall pancreatic atrophy. MAin PD dilation to 9mm in the tail with dialtied side branches. Rerlative abrupt caliber change of main PD at body and tail junction.      EGD, WITH CLOSED BIOPSY   Anesthesia type: Gen Natural Airway         PMHx:  Other Medical History   Syncope HTN (hypertension)   Carotid artery stenosis Mixed hyperlipidemia   Anxiety disorder, unspecified Neuropathy   Diabetes mellitus Pneumonia, unspecified organism   Cellulitis of scalp Osteoarthritis   COPD (chronic obstructive pulmonary disease) Emphysema, unspecified   Personal history of nicotine dependence Digestive disorder   Shortness of breath Fatigue   Gastrointestinal tract imaging abnormality Abdominal pain   Disorder of pancreas Disorder of pancreatic duct   Weight loss      Surgical History:  REPAIR OF CAROTID ARTERY CATARACT EXTRACTION   TONSILLECTOMY COLONOSCOPY   WRIST SURGERY          Vital signs:        Lab Data:      Echo:        EKG:.      Pre-op Assessment    I have reviewed the Patient Summary Reports.     I have reviewed the Nursing Notes. I have reviewed the NPO Status.   I have reviewed the Medications.     Review of Systems  Anesthesia Hx:  No problems with previous Anesthesia                Social:  Non-Smoker        Hematology/Oncology:  Hematology Normal   Oncology Normal                                   EENT/Dental:  EENT/Dental Normal           Cardiovascular:  Exercise tolerance: good   Hypertension                Functional Capacity good / => 4 METS                         Pulmonary:  Pneumonia COPD   Shortness of breath                  Renal/:  Renal/ Normal                 Hepatic/GI:  Hepatic/GI Normal                 Musculoskeletal:  Arthritis               Neurological:  Neurology Normal                                      Endocrine:  Diabetes           Dermatological:  Skin Normal    Psych:  Psychiatric History                  Physical Exam  General: Alert, Oriented, Well nourished and Cooperative    Airway:  Mallampati: II   Mouth Opening: Normal  TM Distance: Normal  Tongue: Normal  Neck ROM: Normal ROM    Dental:  Intact    Chest/Lungs:  Clear to auscultation, Normal Respiratory Rate    Heart:  Rate: Normal  Rhythm: Regular Rhythm        Anesthesia Plan  Type of Anesthesia, risks & benefits discussed:    Anesthesia Type: Gen Natural Airway  Intra-op Monitoring Plan: Standard ASA Monitors  Induction:  IV  Informed Consent: Informed consent signed with the Patient and all parties understand the risks and agree with anesthesia plan.  All questions answered.   ASA Score: 3  Day of Surgery Review of History & Physical: H&P Update referred to the surgeon/provider.    Ready For Surgery From Anesthesia Perspective.     .

## 2024-03-13 VITALS
HEART RATE: 82 BPM | BODY MASS INDEX: 19.83 KG/M2 | TEMPERATURE: 97 F | OXYGEN SATURATION: 100 % | DIASTOLIC BLOOD PRESSURE: 71 MMHG | SYSTOLIC BLOOD PRESSURE: 110 MMHG | HEIGHT: 65 IN | WEIGHT: 119 LBS | RESPIRATION RATE: 20 BRPM

## 2024-03-13 LAB — PSYCHE PATHOLOGY RESULT: NORMAL

## 2024-03-13 NOTE — ANESTHESIA POSTPROCEDURE EVALUATION
Anesthesia Post Evaluation    Patient: Jocelyne Dickinson    Procedure(s) Performed: Procedure(s) (LRB):  UPPER EUS W/ POSS FNA (N/A)  EGD, WITH CLOSED BIOPSY    Final Anesthesia Type: MAC      Patient location during evaluation: OPS  Patient participation: Yes- Able to Participate  Level of consciousness: awake and alert and oriented  Post-procedure vital signs: reviewed and stable  Pain management: adequate  Airway patency: patent    PONV status at discharge: No PONV, vomiting (controlled) and nausea (controlled)  Anesthetic complications: no      Cardiovascular status: stable and blood pressure returned to baseline  Respiratory status: unassisted  Hydration status: euvolemic                Vitals Value Taken Time   /71 03/12/24 0948   Temp 37.3 03/13/24 1310   Pulse 82 03/12/24 0948   Resp 20 03/12/24 0948   SpO2 100 % 03/12/24 0948         No case tracking events are documented in the log.      Pain/Suhail Score: Suhail Score: 10 (3/12/2024  9:48 AM)  Modified Suhail Score: 20 (3/12/2024  9:48 AM)

## 2024-03-25 ENCOUNTER — DOCUMENTATION ONLY (OUTPATIENT)
Dept: SURGICAL ONCOLOGY | Facility: CLINIC | Age: 59
End: 2024-03-25
Payer: COMMERCIAL

## 2024-03-25 NOTE — PROGRESS NOTES
Patient was referred to Dr. Miguelito العلي by Dr. Edi Roldan for abnormal imaging of the pancreas.  She then saw Dr. Atkinson who performed EUS; no need to see Dr. العلي due to EUS findings.  Referral cancelled to Dr. العلي, patient informed.

## 2024-03-26 DIAGNOSIS — E87.6 HYPOKALEMIA: ICD-10-CM

## 2024-03-26 DIAGNOSIS — R06.2 WHEEZING: ICD-10-CM

## 2024-03-26 RX ORDER — POTASSIUM CHLORIDE 20 MEQ/1
TABLET, EXTENDED RELEASE ORAL
Qty: 8 TABLET | Refills: 0 | Status: SHIPPED | OUTPATIENT
Start: 2024-03-26

## 2024-03-26 RX ORDER — BUDESONIDE AND FORMOTEROL FUMARATE DIHYDRATE 160; 4.5 UG/1; UG/1
AEROSOL RESPIRATORY (INHALATION)
Qty: 10.2 G | Refills: 6 | Status: SHIPPED | OUTPATIENT
Start: 2024-03-26

## 2024-04-08 DIAGNOSIS — K21.9 GASTROESOPHAGEAL REFLUX DISEASE WITHOUT ESOPHAGITIS: ICD-10-CM

## 2024-04-08 DIAGNOSIS — J44.0 CHRONIC OBSTRUCTIVE PULMONARY DISEASE WITH (ACUTE) LOWER RESPIRATORY INFECTION: ICD-10-CM

## 2024-04-08 RX ORDER — ALPRAZOLAM 0.5 MG/1
0.5 TABLET ORAL 2 TIMES DAILY
Qty: 60 TABLET | Refills: 5 | Status: SHIPPED | OUTPATIENT
Start: 2024-04-08

## 2024-04-08 RX ORDER — SUCRALFATE 1 G/10ML
1 SUSPENSION ORAL
Qty: 414 ML | Refills: 6 | Status: SHIPPED | OUTPATIENT
Start: 2024-04-08

## 2024-04-08 RX ORDER — ALBUTEROL SULFATE 90 UG/1
AEROSOL, METERED RESPIRATORY (INHALATION)
Qty: 6.7 G | Refills: 6 | Status: SHIPPED | OUTPATIENT
Start: 2024-04-08

## 2024-05-02 ENCOUNTER — OFFICE VISIT (OUTPATIENT)
Dept: FAMILY MEDICINE | Facility: CLINIC | Age: 59
End: 2024-05-02
Payer: COMMERCIAL

## 2024-05-02 VITALS
RESPIRATION RATE: 18 BRPM | SYSTOLIC BLOOD PRESSURE: 152 MMHG | DIASTOLIC BLOOD PRESSURE: 72 MMHG | HEIGHT: 65 IN | WEIGHT: 124.81 LBS | HEART RATE: 62 BPM | OXYGEN SATURATION: 99 % | BODY MASS INDEX: 20.79 KG/M2 | TEMPERATURE: 97 F

## 2024-05-02 DIAGNOSIS — F32.A ANXIETY AND DEPRESSION: Primary | ICD-10-CM

## 2024-05-02 DIAGNOSIS — F41.9 ANXIETY AND DEPRESSION: Primary | ICD-10-CM

## 2024-05-02 DIAGNOSIS — E11.65 TYPE 2 DIABETES MELLITUS WITH HYPERGLYCEMIA, WITHOUT LONG-TERM CURRENT USE OF INSULIN: ICD-10-CM

## 2024-05-02 PROCEDURE — 1159F MED LIST DOCD IN RCRD: CPT | Mod: CPTII,,,

## 2024-05-02 PROCEDURE — 3078F DIAST BP <80 MM HG: CPT | Mod: CPTII,,,

## 2024-05-02 PROCEDURE — 3077F SYST BP >= 140 MM HG: CPT | Mod: CPTII,,,

## 2024-05-02 PROCEDURE — 1160F RVW MEDS BY RX/DR IN RCRD: CPT | Mod: CPTII,,,

## 2024-05-02 PROCEDURE — 3051F HG A1C>EQUAL 7.0%<8.0%: CPT | Mod: CPTII,,,

## 2024-05-02 PROCEDURE — 99214 OFFICE O/P EST MOD 30 MIN: CPT | Mod: ,,,

## 2024-05-02 PROCEDURE — 4010F ACE/ARB THERAPY RXD/TAKEN: CPT | Mod: CPTII,,,

## 2024-05-02 PROCEDURE — 3008F BODY MASS INDEX DOCD: CPT | Mod: CPTII,,,

## 2024-05-02 NOTE — PROGRESS NOTES
Patient ID: [id number]     Chief Complaint: 2 MONTH      HPI:     Jocelyne Dickinson, a 59-year-old woman, is present today for a follow-up appointment. She states that the adjustment in her Cymbalta dosage has been beneficial in effectively managing her anxiety and depression. No other complaints.     Past Medical History:   Diagnosis Date    Abdominal pain     Anxiety disorder, unspecified     Carotid artery stenosis     Cellulitis of scalp     resolved    COPD (chronic obstructive pulmonary disease)     Diabetes mellitus     Digestive disorder     acid reflux    Disorder of pancreas     Disorder of pancreatic duct     Emphysema, unspecified     Fatigue     Gastrointestinal tract imaging abnormality     HTN (hypertension)     Mixed hyperlipidemia     Neuropathy     Osteoarthritis     Pancreatitis     Personal history of nicotine dependence     Pneumonia, unspecified organism     Shortness of breath     Syncope     Weight loss         Past Surgical History:   Procedure Laterality Date    CATARACT EXTRACTION      COLONOSCOPY  07/02/2021    Dr. Nila Sharma    EGD, WITH CLOSED BIOPSY  3/12/2024    Procedure: EGD, WITH CLOSED BIOPSY;  Surgeon: Diego Atkinson MD;  Location: [facility] OR;  Service: Gastroenterology;;  CBD 5 mm, HOP Cyst 4.5mm x 5 mm, Chronic Pancreatitis, PD Head 2.2 mm, PD Body 4.2 mm,    REPAIR OF CAROTID ARTERY      TONSILLECTOMY      ULTRASOUND, ENDOSCOPIC, WITH FINE NEEDLE ASPIRATION N/A 3/12/2024    Procedure: UPPER EUS W/ POSS FNA;  Surgeon: Diego Atkinson MD;  Location: [facility] OR;  Service: Gastroenterology;  Laterality: N/A;  MRI abd- Limited exam due to image quality. Overall pancreatic atrophy. MAin PD dilation to 9mm in the tail with dialtied side branches. Rerlative abrupt caliber change of main PD at body and tail junction.    WRIST SURGERY Bilateral         Social History     Socioeconomic History    Marital status: Single   Tobacco Use    Smoking status: Every Day     Types: Vaping with  nicotine     Start date:     Smokeless tobacco: Never    Tobacco comments:     Quit smoking cigarettes 7 years ago    Substance and Sexual Activity    Alcohol use: Not Currently    Drug use: Never    Sexual activity: Not Currently     Social Determinants of Health     Financial Resource Strain: Medium Risk (2023)    Overall Financial Resource Strain (CARDIA)     Difficulty of Paying Living Expenses: Somewhat hard   Food Insecurity: No Food Insecurity (2024)    Hunger Vital Sign     Worried About Running Out of Food in the Last Year: Never true     Ran Out of Food in the Last Year: Never true   Transportation Needs: No Transportation Needs (2023)    PRAPARE - Transportation     Lack of Transportation (Medical): No     Lack of Transportation (Non-Medical): No   Physical Activity: Unknown (2023)    Exercise Vital Sign     Days of Exercise per Week: Patient declined     Minutes of Exercise per Session: 10 min   Stress: Stress Concern Present (2023)    [place] [employer] - Occupational Stress Questionnaire     Feeling of Stress : To some extent   Housing Stability: Low Risk  (2023)    Housing Stability Vital Sign     Unable to Pay for Housing in the Last Year: No     Number of Places Lived in the Last Year: 1     Unstable Housing in the Last Year: No        Current Outpatient Medications   Medication Instructions    albuterol (PROVENTIL/VENTOLIN HFA) 90 mcg/actuation inhaler inhale TWO puffs EVERY 4 TO 6 HOURS AS NEEDED FOR wheezing    ALPRAZolam (XANAX) 0.5 mg, Oral, 2 times daily    atorvastatin (LIPITOR) 40 mg, Oral, Daily    azelastine (ASTELIN) 137 mcg (0.1 %) nasal spray SMARTSI-2 Spray(s) Both Nares Twice Daily    budesonide-formoterol 160-4.5 mcg (SYMBICORT) 160-4.5 mcg/actuation HFAA INHALE TWO PUFFS TWICE A DAY    citalopram (CELEXA) 10 MG tablet TAKE ONE TABLET BY MOUTH EVERY DAY WITH 20 MG    citalopram (CELEXA) 20 MG tablet TAKE ONE TABLET BY  MOUTH EVERY DAY WITH 10 MG    clopidogreL (PLAVIX) 75 mg, Oral    esomeprazole (NEXIUM) 40 mg, Oral, Before breakfast    fluticasone propionate (FLONASE) 50 mcg/actuation nasal spray USE 1 SPRAY IN EACH NOSTRIL DAILY    gabapentin (NEURONTIN) 300 mg, Oral, Nightly    glimepiride (AMARYL) 2 mg, Oral, 2 times daily, for diabetes.    hydrocortisone 2.5 % cream SMARTSIG:sparingly Topical 3 Times Daily    hydrOXYzine pamoate (VISTARIL) 25 MG Cap TAKE ONE CAPSULE BY MOUTH DAILY     mg tablet TAKE ONE TABLET BY MOUTH THREE TIMES DAILY WITH FOOD AS NEEDED FOR PAIN    losartan (COZAAR) 100 MG tablet TAKE 1 TABLET BY MOUTH DAILY FOR BLOOD PRESSURE    mirtazapine (REMERON) 7.5 mg, Oral, Nightly    NIFEdipine (PROCARDIA-XL) 60 MG (OSM) 24 hr tablet TAKE ONE TABLET BY MOUTH DAILY FOR BLOOD PRESSURE    omeprazole (PRILOSEC) 40 MG capsule TAKE ONE CAPSULE BY MOUTH DAILY for stomach    pioglitazone (ACTOS) 15 mg, Oral, Daily    potassium chloride SA (K-DUR,KLOR-CON) 20 MEQ tablet Take 1 tablet TWICE DAILY FOR FOUR DAYS    SPIRIVA WITH HANDIHALER 18 mcg inhalation capsule 1 capsule, Inhalation, Daily    sucralfate (CARAFATE) 1 g, Oral, As needed (PRN)    TRADJENTA 5 mg, Oral       Review of patient's allergies indicates:   Allergen Reactions    Aller ext-american cockroach Itching and Other (See Comments)    Allerg ext-tree poll-red maple Itching and Other (See Comments)    Cat hair standardized allergenic extract Itching and Other (See Comments)    Dog hair standardized allergenic extract Itching and Other (See Comments)    Grass pollen-ruslan, standard Itching and Other (See Comments)    Horse dander Itching and Other (See Comments)    Tree pollen-box elder Itching and Other (See Comments)    Tree pollen-pecan Itching and Other (See Comments)    Trulicity [dulaglutide] Other (See Comments)     Patient had pancreatitis          Patient Care Team:  Edi Roldan DO as PCP - General (Family Medicine)  Drew Gardiner,  "MD as Consulting Physician (Otolaryngology)  Ricky Foster MD as Consulting Physician (Pulmonary Disease)  Solomon Kolb MD as Consulting Physician (Cardiology)  Diego Atkinson MD as Consulting Physician (Gastroenterology)     Subjective:     Review of Systems    12 point review of systems conducted, negative except as stated in the history of present illness. See HPI for details.    Objective:     Visit Vitals  BP (!) 152/72 (BP Location: Right arm, Patient Position: Sitting, BP Method: Large (Automatic))   Pulse 62   Temp 97.3 °F (36.3 °C)   Resp 18   Ht 5' 5" (1.651 m)   Wt 56.6 kg (124 lb 12.8 oz)   SpO2 99%   BMI 20.77 kg/m²     Physical Exam  Vitals and nursing note reviewed.   Constitutional:       General: She is not in acute distress.     Appearance: She is not ill-appearing.   HENT:      Head: Normocephalic and atraumatic.      Mouth/Throat:      Mouth: Mucous membranes are moist.      Pharynx: Oropharynx is clear.   Eyes:      General: No scleral icterus.     Extraocular Movements: Extraocular movements intact.      Conjunctiva/sclera: Conjunctivae normal.      Pupils: Pupils are equal, round, and reactive to light.   Neck:      Vascular: No carotid bruit.   Cardiovascular:      Rate and Rhythm: Normal rate and regular rhythm.      Heart sounds: No murmur heard.     No friction rub. No gallop.   Pulmonary:      Effort: Pulmonary effort is normal. No respiratory distress.      Breath sounds: Normal breath sounds. No wheezing, rhonchi or rales.   Abdominal:      General: Abdomen is flat. Bowel sounds are normal. There is no distension.      Palpations: Abdomen is soft. There is no mass.      Tenderness: There is no abdominal tenderness.   Musculoskeletal:         General: Normal range of motion.      Cervical back: Normal range of motion and neck supple.   Skin:     General: Skin is warm and dry.   Neurological:      General: No focal deficit present.      Mental Status: She is alert.   Psychiatric:    "      Mood and Affect: Mood normal.       Labs Reviewed:     Chemistry:  Lab Results   Component Value Date     02/27/2024    K 3.0 (L) 02/27/2024    CHLORIDE 104 02/27/2024    BUN 12.0 02/27/2024    CREATININE 0.85 02/27/2024    EGFRNORACEVR >60 02/27/2024    GLUCOSE 191 (H) 02/27/2024    CALCIUM 9.0 02/27/2024    ALKPHOS 82 02/27/2024    LABPROT 6.4 02/27/2024    ALBUMIN 3.6 02/27/2024    BILIDIR 0.3 04/30/2022    IBILI 0.30 04/30/2022    AST 17 02/27/2024    ALT 10 02/27/2024    MG 2.00 02/27/2024    ESXVZSZV12TW 29.3 (L) 10/17/2022    TSH 2.049 08/25/2023    RHPZOF7JLXK 0.95 08/25/2023        Lab Results   Component Value Date    HGBA1C 7.3 (H) 02/27/2024        Hematology:  Lab Results   Component Value Date    WBC 10.43 12/16/2023    HGB 15.5 12/16/2023    HCT 44.9 12/16/2023     12/16/2023       Lipid Panel:  Lab Results   Component Value Date    CHOL 129 01/11/2024    HDL 46 01/11/2024    LDL 45.00 (L) 01/11/2024    TRIG 188 (H) 01/11/2024    TOTALCHOLEST 3 01/11/2024        Urine:  Lab Results   Component Value Date    COLORUA Yellow 12/16/2023    APPEARANCEUA Clear 12/16/2023    SGUA 1.025 12/16/2023    PHUA 6.0 12/16/2023    PROTEINUA 1+ (A) 12/16/2023    GLUCOSEUA 1+ (A) 12/16/2023    KETONESUA 2+ (A) 12/16/2023    BLOODUA Trace (A) 12/16/2023    NITRITESUA Negative 12/16/2023    LEUKOCYTESUR Negative 12/16/2023    RBCUA 3-5 12/16/2023    WBCUA 0-2 12/16/2023    BACTERIA Few (A) 12/16/2023    CREATRANDUR 134.2 (H) 05/24/2023        Assessment:       ICD-10-CM ICD-9-CM   1. Anxiety and depression  F41.9 300.00    F32.A 311   2. Type 2 diabetes mellitus with hyperglycemia, without long-term current use of insulin  E11.65 250.00     790.29        Plan:     1. Anxiety and depression  Assessment & Plan:  Continue Cymbalta 30 mg daily + Xanax 0.5 mg BID.   Practice deep breathing or abdominal breathing exercises when anxiety occurs.  Exercise daily. Get sunlight daily.  Avoid caffeine, alcohol and  stimulants.  Practice positive phrases and repeat throughout the day, along with yoga and relaxation techniques.  Set healthy boundaries, avoid people and conversations that increase stress.  Educated patient on the risks of serotonin based medications such as serotonin modulators and SSRIs/SNRIs including common side effects of nausea, GI upset, headache dizziness as well as the rare risk for worsening symptoms of depression including development of suicidal thoughts or ideations, and serotonin syndrome.   Reports any symptoms of suicidal or homicidal ideations immediately, if clinic is closed go to nearest emergency room.      2. Type 2 diabetes mellitus with hyperglycemia, without long-term current use of insulin  -     Lipid Panel; Future; Expected date: 05/27/2024  -     Comprehensive Metabolic Panel; Future; Expected date: 05/27/2024  -     Hemoglobin A1C; Future; Expected date: 05/27/2024      Follow up in about 3 months (around 8/2/2024) for Follow Up with Dr. Roldan . In addition to their scheduled follow up, the patient has also been instructed to follow up on as needed basis.     Chriss Jean Baptiste NP

## 2024-05-02 NOTE — ASSESSMENT & PLAN NOTE
Continue Cymbalta 30 mg daily + Xanax 0.5 mg BID.   Practice deep breathing or abdominal breathing exercises when anxiety occurs.  Exercise daily. Get sunlight daily.  Avoid caffeine, alcohol and stimulants.  Practice positive phrases and repeat throughout the day, along with yoga and relaxation techniques.  Set healthy boundaries, avoid people and conversations that increase stress.  Educated patient on the risks of serotonin based medications such as serotonin modulators and SSRIs/SNRIs including common side effects of nausea, GI upset, headache dizziness as well as the rare risk for worsening symptoms of depression including development of suicidal thoughts or ideations, and serotonin syndrome.   Reports any symptoms of suicidal or homicidal ideations immediately, if clinic is closed go to nearest emergency room.

## 2024-05-06 DIAGNOSIS — I10 ESSENTIAL HYPERTENSION: ICD-10-CM

## 2024-05-06 DIAGNOSIS — F32.A ANXIETY AND DEPRESSION: ICD-10-CM

## 2024-05-06 DIAGNOSIS — L29.9 PRURITUS: ICD-10-CM

## 2024-05-06 DIAGNOSIS — F41.9 ANXIETY AND DEPRESSION: ICD-10-CM

## 2024-05-06 RX ORDER — CITALOPRAM 10 MG/1
TABLET ORAL
Qty: 30 TABLET | Refills: 2 | Status: SHIPPED | OUTPATIENT
Start: 2024-05-06

## 2024-05-06 RX ORDER — HYDROXYZINE PAMOATE 25 MG/1
CAPSULE ORAL
Qty: 30 CAPSULE | Refills: 5 | Status: SHIPPED | OUTPATIENT
Start: 2024-05-06

## 2024-05-06 RX ORDER — NIFEDIPINE 60 MG/1
TABLET, EXTENDED RELEASE ORAL
Qty: 30 TABLET | Refills: 5 | Status: SHIPPED | OUTPATIENT
Start: 2024-05-06

## 2024-06-24 ENCOUNTER — LAB VISIT (OUTPATIENT)
Dept: LAB | Facility: HOSPITAL | Age: 59
End: 2024-06-24
Payer: COMMERCIAL

## 2024-06-24 DIAGNOSIS — E11.65 TYPE 2 DIABETES MELLITUS WITH HYPERGLYCEMIA, WITHOUT LONG-TERM CURRENT USE OF INSULIN: ICD-10-CM

## 2024-06-24 DIAGNOSIS — E11.65 TYPE 2 DIABETES MELLITUS WITH HYPERGLYCEMIA, WITHOUT LONG-TERM CURRENT USE OF INSULIN: Primary | ICD-10-CM

## 2024-06-24 LAB
ALBUMIN SERPL-MCNC: 3.7 G/DL (ref 3.5–5)
ALBUMIN/GLOB SERPL: 1.3 RATIO (ref 1.1–2)
ALP SERPL-CCNC: 96 UNIT/L (ref 40–150)
ALT SERPL-CCNC: 15 UNIT/L (ref 0–55)
ANION GAP SERPL CALC-SCNC: 8 MEQ/L
AST SERPL-CCNC: 16 UNIT/L (ref 5–34)
BILIRUB SERPL-MCNC: 0.4 MG/DL
BUN SERPL-MCNC: 15 MG/DL (ref 9.8–20.1)
CALCIUM SERPL-MCNC: 9.2 MG/DL (ref 8.4–10.2)
CHLORIDE SERPL-SCNC: 110 MMOL/L (ref 98–107)
CHOLEST SERPL-MCNC: 182 MG/DL
CHOLEST/HDLC SERPL: 3 {RATIO} (ref 0–5)
CO2 SERPL-SCNC: 25 MMOL/L (ref 22–29)
CREAT SERPL-MCNC: 0.86 MG/DL (ref 0.55–1.02)
CREAT/UREA NIT SERPL: 17
EST. AVERAGE GLUCOSE BLD GHB EST-MCNC: 174.3 MG/DL
GFR SERPLBLD CREATININE-BSD FMLA CKD-EPI: >60 ML/MIN/1.73/M2
GLOBULIN SER-MCNC: 2.8 GM/DL (ref 2.4–3.5)
GLUCOSE SERPL-MCNC: 160 MG/DL (ref 74–100)
HBA1C MFR BLD: 7.7 %
HDLC SERPL-MCNC: 63 MG/DL (ref 35–60)
LDLC SERPL CALC-MCNC: 100 MG/DL (ref 50–140)
POTASSIUM SERPL-SCNC: 3.8 MMOL/L (ref 3.5–5.1)
PROT SERPL-MCNC: 6.5 GM/DL (ref 6.4–8.3)
SODIUM SERPL-SCNC: 143 MMOL/L (ref 136–145)
TRIGL SERPL-MCNC: 94 MG/DL (ref 37–140)
VLDLC SERPL CALC-MCNC: 19 MG/DL

## 2024-06-24 PROCEDURE — 80061 LIPID PANEL: CPT

## 2024-06-24 PROCEDURE — 83036 HEMOGLOBIN GLYCOSYLATED A1C: CPT

## 2024-06-24 PROCEDURE — 80053 COMPREHEN METABOLIC PANEL: CPT

## 2024-06-24 PROCEDURE — 36415 COLL VENOUS BLD VENIPUNCTURE: CPT

## 2024-06-24 RX ORDER — PIOGLITAZONEHYDROCHLORIDE 15 MG/1
30 TABLET ORAL DAILY
Qty: 60 TABLET | Refills: 2 | Status: SHIPPED | OUTPATIENT
Start: 2024-06-24 | End: 2024-06-26 | Stop reason: SDUPTHER

## 2024-06-25 ENCOUNTER — TELEPHONE (OUTPATIENT)
Dept: FAMILY MEDICINE | Facility: CLINIC | Age: 59
End: 2024-06-25
Payer: COMMERCIAL

## 2024-06-25 NOTE — TELEPHONE ENCOUNTER
----- Message from Chriss Jean Baptiste NP sent at 6/24/2024  1:50 PM CDT -----  Please inform patient of lab results.     1. A1C has increased from 7.3 to 7.7. Increase Actos to 30 mg daily.     2. All other labs within normal ranges.     Thanks for all you do,   Chriss

## 2024-06-26 ENCOUNTER — TELEPHONE (OUTPATIENT)
Dept: FAMILY MEDICINE | Facility: CLINIC | Age: 59
End: 2024-06-26
Payer: COMMERCIAL

## 2024-06-26 DIAGNOSIS — E11.65 TYPE 2 DIABETES MELLITUS WITH HYPERGLYCEMIA, WITHOUT LONG-TERM CURRENT USE OF INSULIN: ICD-10-CM

## 2024-06-26 DIAGNOSIS — E11.65 TYPE 2 DIABETES MELLITUS WITH HYPERGLYCEMIA, WITHOUT LONG-TERM CURRENT USE OF INSULIN: Primary | ICD-10-CM

## 2024-06-26 LAB
LEFT EYE DM RETINOPATHY: NEGATIVE
RIGHT EYE DM RETINOPATHY: NEGATIVE

## 2024-06-26 RX ORDER — PIOGLITAZONEHYDROCHLORIDE 30 MG/1
30 TABLET ORAL DAILY
Qty: 90 TABLET | Refills: 3 | Status: SHIPPED | OUTPATIENT
Start: 2024-06-26 | End: 2025-06-26

## 2024-06-26 RX ORDER — PIOGLITAZONEHYDROCHLORIDE 15 MG/1
30 TABLET ORAL DAILY
Qty: 60 TABLET | Refills: 2 | Status: SHIPPED | OUTPATIENT
Start: 2024-06-26 | End: 2024-06-26

## 2024-06-26 NOTE — TELEPHONE ENCOUNTER
----- Message from Maria E Whiting sent at 6/26/2024  1:06 PM CDT -----  Regarding: refill  pioglitazone (ACTOS) 15 MG tablet, Mrs Casanova called stating [facility] did not receive the refill please resend to [facility]

## 2024-07-01 ENCOUNTER — DOCUMENTATION ONLY (OUTPATIENT)
Dept: FAMILY MEDICINE | Facility: CLINIC | Age: 59
End: 2024-07-01
Payer: COMMERCIAL

## 2024-07-12 DIAGNOSIS — I65.22 LEFT CAROTID ARTERY STENOSIS: ICD-10-CM

## 2024-07-12 RX ORDER — CLOPIDOGREL BISULFATE 75 MG/1
75 TABLET ORAL
Qty: 30 TABLET | Refills: 4 | Status: SHIPPED | OUTPATIENT
Start: 2024-07-12

## 2024-07-22 ENCOUNTER — PATIENT OUTREACH (OUTPATIENT)
Facility: CLINIC | Age: 59
End: 2024-07-22
Payer: COMMERCIAL

## 2024-07-22 VITALS — DIASTOLIC BLOOD PRESSURE: 60 MMHG | SYSTOLIC BLOOD PRESSURE: 130 MMHG

## 2024-07-22 DIAGNOSIS — E11.65 TYPE 2 DIABETES MELLITUS WITH HYPERGLYCEMIA, WITHOUT LONG-TERM CURRENT USE OF INSULIN: Primary | ICD-10-CM

## 2024-07-22 NOTE — PROGRESS NOTES
Population Health Outreach. Health Maintenance Topic(s) Outreach Outcomes & Actions Taken:    Blood Pressure - Outreach Outcomes & Actions Taken  : Primary Care Follow Up Visit Scheduled  and Remote Blood Pressure Reading Captured    Lab(s) - Outreach Outcomes & Actions Taken  : Overdue Lab(s) Ordered and Primary Care Follow Up Visit Scheduled  DM Urine future order placed    Cervical Cancer Screening - Outreach Outcomes & Actions Taken  : Pt Declined Scheduling Pap Smear/HPV           Additional Notes:   [facility] note 5/30/24 UR=[id number]    Scheduled appointment notes 5/2/24 office was aware patient was due for pap-patient refused.          Care Management, Digital Medicine, and/or Education Referrals      Next Steps - Referral Actions: Digital Medicine Outcomes and Actions Taken: Pt Declined or Not Eligible

## 2024-08-05 ENCOUNTER — OFFICE VISIT (OUTPATIENT)
Dept: FAMILY MEDICINE | Facility: CLINIC | Age: 59
End: 2024-08-05
Payer: COMMERCIAL

## 2024-08-05 ENCOUNTER — LAB VISIT (OUTPATIENT)
Dept: LAB | Facility: HOSPITAL | Age: 59
End: 2024-08-05
Attending: FAMILY MEDICINE
Payer: COMMERCIAL

## 2024-08-05 VITALS
DIASTOLIC BLOOD PRESSURE: 84 MMHG | WEIGHT: 136 LBS | HEART RATE: 60 BPM | SYSTOLIC BLOOD PRESSURE: 168 MMHG | BODY MASS INDEX: 22.66 KG/M2 | TEMPERATURE: 98 F | HEIGHT: 65 IN | OXYGEN SATURATION: 98 % | RESPIRATION RATE: 18 BRPM

## 2024-08-05 DIAGNOSIS — E11.42 DIABETIC POLYNEUROPATHY ASSOCIATED WITH TYPE 2 DIABETES MELLITUS: ICD-10-CM

## 2024-08-05 DIAGNOSIS — E11.65 TYPE 2 DIABETES MELLITUS WITH HYPERGLYCEMIA, WITHOUT LONG-TERM CURRENT USE OF INSULIN: ICD-10-CM

## 2024-08-05 DIAGNOSIS — F32.A ANXIETY AND DEPRESSION: Primary | ICD-10-CM

## 2024-08-05 DIAGNOSIS — F41.9 ANXIETY AND DEPRESSION: Primary | ICD-10-CM

## 2024-08-05 DIAGNOSIS — Z12.31 SCREENING MAMMOGRAM FOR BREAST CANCER: ICD-10-CM

## 2024-08-05 PROBLEM — K21.9 GASTROESOPHAGEAL REFLUX DISEASE: Status: ACTIVE | Noted: 2024-05-30

## 2024-08-05 LAB
CREAT UR-MCNC: 89.1 MG/DL (ref 45–106)
MICROALBUMIN UR-MCNC: <5 UG/ML
MICROALBUMIN/CREAT RATIO PNL UR: NORMAL

## 2024-08-05 PROCEDURE — 4010F ACE/ARB THERAPY RXD/TAKEN: CPT | Mod: CPTII,,, | Performed by: FAMILY MEDICINE

## 2024-08-05 PROCEDURE — 1159F MED LIST DOCD IN RCRD: CPT | Mod: CPTII,,, | Performed by: FAMILY MEDICINE

## 2024-08-05 PROCEDURE — 82570 ASSAY OF URINE CREATININE: CPT

## 2024-08-05 PROCEDURE — 3051F HG A1C>EQUAL 7.0%<8.0%: CPT | Mod: CPTII,,, | Performed by: FAMILY MEDICINE

## 2024-08-05 PROCEDURE — 3008F BODY MASS INDEX DOCD: CPT | Mod: CPTII,,, | Performed by: FAMILY MEDICINE

## 2024-08-05 PROCEDURE — 99214 OFFICE O/P EST MOD 30 MIN: CPT | Mod: ,,, | Performed by: FAMILY MEDICINE

## 2024-08-05 PROCEDURE — 2023F DILAT RTA XM W/O RTNOPTHY: CPT | Mod: CPTII,,, | Performed by: FAMILY MEDICINE

## 2024-08-05 PROCEDURE — 3079F DIAST BP 80-89 MM HG: CPT | Mod: CPTII,,, | Performed by: FAMILY MEDICINE

## 2024-08-05 PROCEDURE — 3077F SYST BP >= 140 MM HG: CPT | Mod: CPTII,,, | Performed by: FAMILY MEDICINE

## 2024-08-05 PROCEDURE — 1160F RVW MEDS BY RX/DR IN RCRD: CPT | Mod: CPTII,,, | Performed by: FAMILY MEDICINE

## 2024-09-12 DIAGNOSIS — G47.00 INSOMNIA, UNSPECIFIED TYPE: ICD-10-CM

## 2024-09-12 DIAGNOSIS — F41.9 ANXIETY AND DEPRESSION: ICD-10-CM

## 2024-09-12 DIAGNOSIS — E11.65 TYPE 2 DIABETES MELLITUS WITH HYPERGLYCEMIA, WITHOUT LONG-TERM CURRENT USE OF INSULIN: ICD-10-CM

## 2024-09-12 DIAGNOSIS — F32.A ANXIETY AND DEPRESSION: ICD-10-CM

## 2024-09-12 DIAGNOSIS — E11.42 DIABETIC POLYNEUROPATHY ASSOCIATED WITH TYPE 2 DIABETES MELLITUS: ICD-10-CM

## 2024-09-12 RX ORDER — GLIMEPIRIDE 2 MG/1
2 TABLET ORAL 2 TIMES DAILY
Qty: 180 TABLET | Refills: 3 | Status: SHIPPED | OUTPATIENT
Start: 2024-09-12

## 2024-09-12 RX ORDER — CITALOPRAM 10 MG/1
TABLET ORAL
Qty: 30 TABLET | Refills: 2 | Status: SHIPPED | OUTPATIENT
Start: 2024-09-12

## 2024-09-12 RX ORDER — MIRTAZAPINE 15 MG/1
TABLET, FILM COATED ORAL
Qty: 15 TABLET | Refills: 11 | Status: SHIPPED | OUTPATIENT
Start: 2024-09-12

## 2024-09-12 RX ORDER — GABAPENTIN 300 MG/1
300 CAPSULE ORAL NIGHTLY
Qty: 30 CAPSULE | Refills: 11 | Status: SHIPPED | OUTPATIENT
Start: 2024-09-12

## 2024-10-24 ENCOUNTER — HOSPITAL ENCOUNTER (OUTPATIENT)
Dept: RADIOLOGY | Facility: HOSPITAL | Age: 59
Discharge: HOME | End: 2024-10-24
Attending: FAMILY MEDICINE
Payer: COMMERCIAL

## 2024-10-24 DIAGNOSIS — Z12.31 SCREENING MAMMOGRAM FOR BREAST CANCER: ICD-10-CM

## 2024-10-24 PROCEDURE — 77063 BREAST TOMOSYNTHESIS BI: CPT | Mod: TC

## 2024-10-24 PROCEDURE — 77067 SCR MAMMO BI INCL CAD: CPT | Mod: TC

## 2024-10-29 ENCOUNTER — TELEPHONE (OUTPATIENT)
Dept: FAMILY MEDICINE | Facility: CLINIC | Age: 59
End: 2024-10-29
Payer: COMMERCIAL

## 2024-11-06 DIAGNOSIS — E11.9 TYPE 2 DIABETES MELLITUS WITHOUT COMPLICATIONS: ICD-10-CM

## 2024-11-06 DIAGNOSIS — F41.9 ANXIETY AND DEPRESSION: Primary | ICD-10-CM

## 2024-11-06 DIAGNOSIS — E11.65 TYPE 2 DIABETES MELLITUS WITH HYPERGLYCEMIA, WITHOUT LONG-TERM CURRENT USE OF INSULIN: ICD-10-CM

## 2024-11-06 DIAGNOSIS — F32.A ANXIETY AND DEPRESSION: Primary | ICD-10-CM

## 2024-11-06 RX ORDER — ALPRAZOLAM 0.5 MG/1
0.5 TABLET ORAL 2 TIMES DAILY
Qty: 60 TABLET | Refills: 5 | Status: SHIPPED | OUTPATIENT
Start: 2024-11-06

## 2024-11-06 RX ORDER — PIOGLITAZONEHYDROCHLORIDE 30 MG/1
30 TABLET ORAL
Qty: 30 TABLET | Refills: 2 | Status: SHIPPED | OUTPATIENT
Start: 2024-11-06

## 2024-11-06 RX ORDER — LINAGLIPTIN 5 MG/1
5 TABLET, FILM COATED ORAL
Qty: 30 TABLET | Refills: 5 | Status: SHIPPED | OUTPATIENT
Start: 2024-11-06

## 2024-11-20 ENCOUNTER — HOSPITAL ENCOUNTER (OUTPATIENT)
Dept: RADIOLOGY | Facility: HOSPITAL | Age: 59
Discharge: HOME | End: 2024-11-20
Attending: FAMILY MEDICINE
Payer: COMMERCIAL

## 2024-11-20 DIAGNOSIS — R92.8 ABNORMAL MAMMOGRAM: ICD-10-CM

## 2024-11-20 PROCEDURE — 77062 BREAST TOMOSYNTHESIS BI: CPT | Mod: 26,,, | Performed by: STUDENT IN AN ORGANIZED HEALTH CARE EDUCATION/TRAINING PROGRAM

## 2024-11-20 PROCEDURE — 77062 BREAST TOMOSYNTHESIS BI: CPT | Mod: TC

## 2024-11-20 PROCEDURE — 76642 ULTRASOUND BREAST LIMITED: CPT | Mod: 26,LT,, | Performed by: STUDENT IN AN ORGANIZED HEALTH CARE EDUCATION/TRAINING PROGRAM

## 2024-11-20 PROCEDURE — 76642 ULTRASOUND BREAST LIMITED: CPT | Mod: TC,LT

## 2024-11-20 PROCEDURE — 77066 DX MAMMO INCL CAD BI: CPT | Mod: 26,,, | Performed by: STUDENT IN AN ORGANIZED HEALTH CARE EDUCATION/TRAINING PROGRAM

## 2024-12-06 DIAGNOSIS — J44.0 CHRONIC OBSTRUCTIVE PULMONARY DISEASE WITH (ACUTE) LOWER RESPIRATORY INFECTION: ICD-10-CM

## 2024-12-09 RX ORDER — ALBUTEROL SULFATE 90 UG/1
INHALANT RESPIRATORY (INHALATION)
Qty: 6.7 G | Refills: 6 | Status: SHIPPED | OUTPATIENT
Start: 2024-12-09

## 2024-12-11 ENCOUNTER — TELEPHONE (OUTPATIENT)
Dept: PHARMACY | Facility: CLINIC | Age: 59
End: 2024-12-11
Payer: COMMERCIAL

## 2024-12-11 NOTE — TELEPHONE ENCOUNTER
[facility]/Population Health Chart Review & Patient Outreach Details For Medication Adherence Project    Reason for Outreach Encounter: 3rd Party payor non-compliance report (Humana, BCBS, C, etc)  2.  Patient Outreach Method: Reviewed Patient Chart  3.   Medication in question: losartan & atorvastatin    LAST FILLED: 12/6/24 for 30 day supply  Hypertension Medications               losartan (COZAAR) 100 MG tablet TAKE 1 TABLET BY MOUTH DAILY FOR BLOOD PRESSURE    NIFEdipine (PROCARDIA-XL) 60 MG (OSM) 24 hr tablet TAKE ONE TABLET BY MOUTH DAILY FOR BLOOD PRESSURE              Hyperlipidemia Medications               atorvastatin (LIPITOR) 40 MG tablet Take 40 mg by mouth once daily.               4.  Reviewed and or Updates Made To: Patient Chart  5. Outreach Outcomes and/or actions taken: Patient filled medication and is on track to be adherent

## 2025-01-08 ENCOUNTER — TELEPHONE (OUTPATIENT)
Dept: PHARMACY | Facility: CLINIC | Age: 60
End: 2025-01-08
Payer: COMMERCIAL

## 2025-01-08 NOTE — TELEPHONE ENCOUNTER
[facility]/Population Health Chart Review & Patient Outreach Details For Medication Adherence Project    Reason for Outreach Encounter: 3rd Party payor non-compliance report (Humana, BCBS, UHC, etc)  2.  Patient Outreach Method: Reviewed patient chart   3.   Medication in question:    Hyperlipidemia Medications               atorvastatin (LIPITOR) 40 MG tablet Take 40 mg by mouth once daily.                    last filled  1/8/24 for 30 day supply      4.  Reviewed and or Updates Made To: Patient Chart  5. Outreach Outcomes and/or actions taken: Patient filled medication and is on track to be adherent  Additional Notes:

## 2025-01-31 ENCOUNTER — TELEPHONE (OUTPATIENT)
Dept: FAMILY MEDICINE | Facility: CLINIC | Age: 60
End: 2025-01-31

## 2025-01-31 DIAGNOSIS — F41.9 ANXIETY AND DEPRESSION: ICD-10-CM

## 2025-01-31 DIAGNOSIS — F32.A ANXIETY AND DEPRESSION: ICD-10-CM

## 2025-01-31 DIAGNOSIS — R06.2 WHEEZING: ICD-10-CM

## 2025-02-03 DIAGNOSIS — E11.65 TYPE 2 DIABETES MELLITUS WITH HYPERGLYCEMIA, WITHOUT LONG-TERM CURRENT USE OF INSULIN: Primary | ICD-10-CM

## 2025-02-03 RX ORDER — CITALOPRAM 10 MG/1
TABLET ORAL
Qty: 30 TABLET | Refills: 2 | Status: SHIPPED | OUTPATIENT
Start: 2025-02-03

## 2025-02-03 RX ORDER — BUDESONIDE AND FORMOTEROL FUMARATE DIHYDRATE 160; 4.5 UG/1; UG/1
AEROSOL RESPIRATORY (INHALATION)
Qty: 10.2 G | Refills: 6 | Status: SHIPPED | OUTPATIENT
Start: 2025-02-03

## 2025-02-04 ENCOUNTER — LAB VISIT (OUTPATIENT)
Dept: LAB | Facility: HOSPITAL | Age: 60
End: 2025-02-04
Attending: FAMILY MEDICINE
Payer: COMMERCIAL

## 2025-02-04 DIAGNOSIS — E11.65 TYPE 2 DIABETES MELLITUS WITH HYPERGLYCEMIA, WITHOUT LONG-TERM CURRENT USE OF INSULIN: ICD-10-CM

## 2025-02-04 LAB
ALBUMIN SERPL-MCNC: 4 G/DL (ref 3.5–5)
ALBUMIN/GLOB SERPL: 1.5 RATIO (ref 1.1–2)
ALP SERPL-CCNC: 111 UNIT/L (ref 40–150)
ALT SERPL-CCNC: 19 UNIT/L (ref 0–55)
ANION GAP SERPL CALC-SCNC: 8 MEQ/L
AST SERPL-CCNC: 18 UNIT/L (ref 5–34)
BASOPHILS # BLD AUTO: 0.02 X10(3)/MCL
BASOPHILS NFR BLD AUTO: 0.4 %
BILIRUB SERPL-MCNC: 0.5 MG/DL
BUN SERPL-MCNC: 10 MG/DL (ref 9.8–20.1)
CALCIUM SERPL-MCNC: 9.6 MG/DL (ref 8.4–10.2)
CHLORIDE SERPL-SCNC: 107 MMOL/L (ref 98–107)
CO2 SERPL-SCNC: 28 MMOL/L (ref 22–29)
CREAT SERPL-MCNC: 0.93 MG/DL (ref 0.55–1.02)
CREAT/UREA NIT SERPL: 11
EOSINOPHIL # BLD AUTO: 0.09 X10(3)/MCL (ref 0–0.9)
EOSINOPHIL NFR BLD AUTO: 2 %
ERYTHROCYTE [DISTWIDTH] IN BLOOD BY AUTOMATED COUNT: 13.7 % (ref 11.5–17)
EST. AVERAGE GLUCOSE BLD GHB EST-MCNC: 286.2 MG/DL
GFR SERPLBLD CREATININE-BSD FMLA CKD-EPI: >60 ML/MIN/1.73/M2
GLOBULIN SER-MCNC: 2.7 GM/DL (ref 2.4–3.5)
GLUCOSE SERPL-MCNC: 239 MG/DL (ref 74–100)
HBA1C MFR BLD: 11.6 %
HCT VFR BLD AUTO: 42.2 % (ref 37–47)
HGB BLD-MCNC: 13.5 G/DL (ref 12–16)
IMM GRANULOCYTES # BLD AUTO: 0.01 X10(3)/MCL (ref 0–0.04)
IMM GRANULOCYTES NFR BLD AUTO: 0.2 %
LYMPHOCYTES # BLD AUTO: 2.12 X10(3)/MCL (ref 0.6–4.6)
LYMPHOCYTES NFR BLD AUTO: 46.6 %
MCH RBC QN AUTO: 28.1 PG (ref 27–31)
MCHC RBC AUTO-ENTMCNC: 32 G/DL (ref 33–36)
MCV RBC AUTO: 87.7 FL (ref 80–94)
MONOCYTES # BLD AUTO: 0.46 X10(3)/MCL (ref 0.1–1.3)
MONOCYTES NFR BLD AUTO: 10.1 %
NEUTROPHILS # BLD AUTO: 1.85 X10(3)/MCL (ref 2.1–9.2)
NEUTROPHILS NFR BLD AUTO: 40.7 %
NRBC BLD AUTO-RTO: 0 %
PLATELET # BLD AUTO: 220 X10(3)/MCL (ref 130–400)
PMV BLD AUTO: 10.6 FL (ref 7.4–10.4)
POTASSIUM SERPL-SCNC: 3.9 MMOL/L (ref 3.5–5.1)
PROT SERPL-MCNC: 6.7 GM/DL (ref 6.4–8.3)
RBC # BLD AUTO: 4.81 X10(6)/MCL (ref 4.2–5.4)
SODIUM SERPL-SCNC: 143 MMOL/L (ref 136–145)
WBC # BLD AUTO: 4.55 X10(3)/MCL (ref 4.5–11.5)

## 2025-02-04 PROCEDURE — 83036 HEMOGLOBIN GLYCOSYLATED A1C: CPT

## 2025-02-04 PROCEDURE — 36415 COLL VENOUS BLD VENIPUNCTURE: CPT

## 2025-02-04 PROCEDURE — 85025 COMPLETE CBC W/AUTO DIFF WBC: CPT

## 2025-02-04 PROCEDURE — 80053 COMPREHEN METABOLIC PANEL: CPT

## 2025-02-05 ENCOUNTER — TELEPHONE (OUTPATIENT)
Dept: FAMILY MEDICINE | Facility: CLINIC | Age: 60
End: 2025-02-05

## 2025-02-05 ENCOUNTER — OFFICE VISIT (OUTPATIENT)
Dept: FAMILY MEDICINE | Facility: CLINIC | Age: 60
End: 2025-02-05
Payer: COMMERCIAL

## 2025-02-05 VITALS
OXYGEN SATURATION: 98 % | WEIGHT: 150.81 LBS | HEART RATE: 67 BPM | RESPIRATION RATE: 18 BRPM | SYSTOLIC BLOOD PRESSURE: 132 MMHG | BODY MASS INDEX: 25.13 KG/M2 | DIASTOLIC BLOOD PRESSURE: 64 MMHG | TEMPERATURE: 98 F | HEIGHT: 65 IN

## 2025-02-05 DIAGNOSIS — E11.65 TYPE 2 DIABETES MELLITUS WITH HYPERGLYCEMIA, WITHOUT LONG-TERM CURRENT USE OF INSULIN: Primary | ICD-10-CM

## 2025-02-05 PROCEDURE — 1160F RVW MEDS BY RX/DR IN RCRD: CPT | Mod: CPTII,,, | Performed by: FAMILY MEDICINE

## 2025-02-05 PROCEDURE — 4010F ACE/ARB THERAPY RXD/TAKEN: CPT | Mod: CPTII,,, | Performed by: FAMILY MEDICINE

## 2025-02-05 PROCEDURE — 3008F BODY MASS INDEX DOCD: CPT | Mod: CPTII,,, | Performed by: FAMILY MEDICINE

## 2025-02-05 PROCEDURE — 99213 OFFICE O/P EST LOW 20 MIN: CPT | Mod: ,,, | Performed by: FAMILY MEDICINE

## 2025-02-05 PROCEDURE — 3078F DIAST BP <80 MM HG: CPT | Mod: CPTII,,, | Performed by: FAMILY MEDICINE

## 2025-02-05 PROCEDURE — 3075F SYST BP GE 130 - 139MM HG: CPT | Mod: CPTII,,, | Performed by: FAMILY MEDICINE

## 2025-02-05 PROCEDURE — 3046F HEMOGLOBIN A1C LEVEL >9.0%: CPT | Mod: CPTII,,, | Performed by: FAMILY MEDICINE

## 2025-02-05 PROCEDURE — 1159F MED LIST DOCD IN RCRD: CPT | Mod: CPTII,,, | Performed by: FAMILY MEDICINE

## 2025-02-05 RX ORDER — INSULIN GLARGINE 100 [IU]/ML
20 INJECTION, SOLUTION SUBCUTANEOUS DAILY
Qty: 6 ML | Refills: 11 | Status: SHIPPED | OUTPATIENT
Start: 2025-02-05 | End: 2026-02-05

## 2025-02-05 RX ORDER — PEN NEEDLE, DIABETIC 30 GX3/16"
1 NEEDLE, DISPOSABLE MISCELLANEOUS DAILY
Qty: 100 EACH | Refills: 1 | Status: SHIPPED | OUTPATIENT
Start: 2025-02-05

## 2025-02-05 NOTE — PROGRESS NOTES
"   Patient ID: [id number]     Chief Complaint: Diabetes (6 month follow up. " Sugar has been high ")    HPI:     Jocelyne Dickinson is a 60 y.o. female here today for Diabetes (6 month follow up. " Sugar has been high ") . A1C is now 11.6, was 7.7 back on 6/24/24. Recent PET scan had to be canceled as a result.     -------------------------------------    Abdominal pain    Anxiety disorder, unspecified    Carotid artery stenosis    Cellulitis of scalp    resolved    COPD (chronic obstructive pulmonary disease)    Diabetes mellitus    Digestive disorder    acid reflux    Disorder of pancreas    Disorder of pancreatic duct    Emphysema, unspecified    Fatigue    Gastrointestinal tract imaging abnormality    HTN (hypertension)    Mixed hyperlipidemia    Neuropathy    Osteoarthritis    Pancreatitis    Personal history of nicotine dependence    Pneumonia, unspecified organism    Shortness of breath    Syncope    Weight loss        Past Surgical History:   Procedure Laterality Date    CATARACT EXTRACTION      COLONOSCOPY  07/02/2021    Dr. Nila Sharma    EGD, WITH CLOSED BIOPSY  03/12/2024    Procedure: EGD, WITH CLOSED BIOPSY;  Surgeon: Diego Atkinson MD;  Location: [facility];  Service: Gastroenterology;;  CBD 5 mm, HOP Cyst 4.5mm x 5 mm, Chronic Pancreatitis, PD Head 2.2 mm, PD Body 4.2 mm,    EYE SURGERY  2022    Cataract surgery on both eyes    REPAIR OF CAROTID ARTERY      TONSILLECTOMY      ULTRASOUND, ENDOSCOPIC, WITH FINE NEEDLE ASPIRATION N/A 03/12/2024    Procedure: UPPER EUS W/ POSS FNA;  Surgeon: Diego Atkinson MD;  Location: [facility] OR;  Service: Gastroenterology;  Laterality: N/A;  MRI abd- Limited exam due to image quality. Overall pancreatic atrophy. MAin PD dilation to 9mm in the tail with dialtied side branches. Rerlative abrupt caliber change of main PD at body and tail junction.    WRIST SURGERY Bilateral        Review of patient's allergies indicates:   Allergen Reactions    Aller ext-american cockroach " Itching and Other (See Comments)    Allerg ext-tree poll-red maple Itching and Other (See Comments)    Cat hair standardized allergenic extract Itching and Other (See Comments)    Dog hair standardized allergenic extract Itching and Other (See Comments)    Grass pollen-ruslan, standard Itching and Other (See Comments)    Horse dander Itching and Other (See Comments)    Tree pollen-box elder Itching and Other (See Comments)    Tree pollen-pecan Itching and Other (See Comments)    Trulicity [dulaglutide] Other (See Comments)     Patient had pancreatitis         Outpatient Medications Marked as Taking for the 25 encounter (Office Visit) with Edi Roldan,    Medication Sig Dispense Refill    albuterol (PROVENTIL/VENTOLIN HFA) 90 mcg/actuation inhaler inhale TWO puffs EVERY 4 TO 6 HOURS AS NEEDED FOR wheezing 6.7 g 6    ALPRAZolam (XANAX) 0.5 MG tablet TAKE ONE TABLET BY MOUTH TWICE DAILY 60 tablet 5    atorvastatin (LIPITOR) 40 MG tablet Take 40 mg by mouth once daily.      azelastine (ASTELIN) 137 mcg (0.1 %) nasal spray SMARTSI-2 Spray(s) Both Nares Twice Daily      budesonide-formoterol 160-4.5 mcg (SYMBICORT) 160-4.5 mcg/actuation HFAA INHALE TWO PUFFS TWICE A DAY 10.2 g 6    citalopram (CELEXA) 10 MG tablet TAKE ONE TABLET BY MOUTH ONCE DAILY WITH 20 MG 30 tablet 2    citalopram (CELEXA) 20 MG tablet TAKE ONE TABLET BY MOUTH EVERY DAY WITH 10 MG 30 tablet 0    clopidogreL (PLAVIX) 75 mg tablet TAKE ONE TABLET BY MOUTH DAILY 30 tablet 4    esomeprazole (NEXIUM) 40 MG capsule Take 1 capsule (40 mg total) by mouth before breakfast. 30 capsule 11    gabapentin (NEURONTIN) 300 MG capsule TAKE ONE CAPSULE BY MOUTH EVERY EVENING 30 capsule 11    glimepiride (AMARYL) 2 MG tablet TAKE ONE TABLET BY MOUTH TWICE DAILY FOR DIABETES 180 tablet 3    hydrocortisone 2.5 % cream SMARTSIG:sparingly Topical 3 Times Daily      hydrOXYzine pamoate (VISTARIL) 25 MG Cap TAKE ONE CAPSULE BY MOUTH DAILY 30 capsule 5     losartan (COZAAR) 100 MG tablet TAKE 1 TABLET BY MOUTH DAILY FOR BLOOD PRESSURE 30 tablet 5    mirtazapine (REMERON) 15 MG tablet TAKE 1/2 TABLET BY MOUTH DAILY EVERY EVENING 15 tablet 11    NIFEdipine (PROCARDIA-XL) 60 MG (OSM) 24 hr tablet TAKE ONE TABLET BY MOUTH DAILY FOR BLOOD PRESSURE 30 tablet 5    pioglitazone (ACTOS) 30 MG tablet TAKE ONE TABLET BY MOUTH DAILY 30 tablet 2    SPIRIVA WITH HANDIHALER 18 mcg inhalation capsule Inhale 1 capsule into the lungs Daily.      sucralfate (CARAFATE) 100 mg/mL suspension Take 10 mLs (1 g total) by mouth as needed. 414 mL 6    TRADJENTA 5 mg Tab tablet TAKE ONE TABLET BY MOUTH DAILY 30 tablet 5       Social History     Socioeconomic History    Marital status: Single   Tobacco Use    Smoking status: Former     Current packs/day: 1.50     Average packs/day: 1.5 packs/day for 35.0 years (52.5 ttl pk-yrs)     Types: Cigarettes, Vaping with nicotine     Start date: 2016    Smokeless tobacco: Never    Tobacco comments:     Dont remember the dates   Substance and Sexual Activity    Alcohol use: Not Currently     Comment: Beer occasionally    Drug use: Never    Sexual activity: Not Currently     Social Drivers of Health     Financial Resource Strain: Medium Risk (12/22/2023)    Overall Financial Resource Strain (CARDIA)     Difficulty of Paying Living Expenses: Somewhat hard   Food Insecurity: No Food Insecurity (8/19/2024)    Received from [facility]    Hunger Vital Sign     Worried About Running Out of Food in the Last Year: Never true     Ran Out of Food in the Last Year: Never true   Transportation Needs: No Transportation Needs (5/30/2024)    Received from [facility]    PRAPARE - Transportation     Lack of Transportation (Medical): No     Lack of Transportation (Non-Medical): No   Physical Activity: Inactive (8/19/2024)    Received from [facility]    Exercise Vital Sign     Days of Exercise per  "Week: 0 days     Minutes of Exercise per Session: 0 min   Stress: Stress Concern Present (12/22/2023)    [employer] - Occupational Stress Questionnaire     Feeling of Stress : To some extent   Housing Stability: Low Risk  (12/22/2023)    Housing Stability Vital Sign     Unable to Pay for Housing in the Last Year: No     Number of Places Lived in the Last Year: 1     Unstable Housing in the Last Year: No        Family History   Problem Relation Name Age of Onset    Hypertension Mother      Hypertension Father Yordy Dartez     Heart disease Father Yordy Dartez     Cancer Father Yordy Dartez     Stroke Father Yordy Dartez     Cancer Maternal Grandmother      Vision loss Maternal Grandmother          Patient Care Team:  Edi Roldan DO as PCP - General (Family Medicine)  Drew Gardiner MD as Consulting Physician (Otolaryngology)  Ricky Foster MD as Consulting Physician (Pulmonary Disease)  Solomon Kolb MD as Consulting Physician (Cardiology)  Diego Atkinson MD as Consulting Physician (Gastroenterology)  Radha Pedraza LPN as Care Coordinator     Subjective:     ROS    See HPI for details  All Other ROS: Negative except as stated in HPI.       Objective:     /64 (BP Location: Left arm, Patient Position: Sitting)   Pulse 67   Temp 97.7 °F (36.5 °C)   Resp 18   Ht 5' 5" (1.651 m)   Wt 68.4 kg (150 lb 12.8 oz)   SpO2 98%   BMI 25.09 kg/m²     Physical Exam  Vitals reviewed.   Constitutional:       General: She is not in acute distress.     Appearance: Normal appearance.   Cardiovascular:      Rate and Rhythm: Normal rate and regular rhythm.      Heart sounds: No murmur heard.     No friction rub. No gallop.   Pulmonary:      Effort: No respiratory distress.      Breath sounds: No wheezing, rhonchi or rales.   Musculoskeletal:         General: No swelling, tenderness or deformity.      Right lower leg: No edema.      Left lower leg: No edema.   Skin:    "  General: Skin is warm and dry.      Findings: No lesion or rash.   Neurological:      General: No focal deficit present.      Mental Status: She is alert.   Psychiatric:         Mood and Affect: Mood normal.         Assessment/Plan:     1. Type 2 diabetes mellitus with hyperglycemia, without long-term current use of insulin  -     Ambulatory referral/consult to Diabetes Education; Future; Expected date: 02/12/2025  -     insulin glargine U-100, Lantus, (LANTUS SOLOSTAR U-100 INSULIN) 100 unit/mL (3 mL) InPn pen; Inject 20 Units into the skin once daily.  Dispense: 6 mL; Refill: 11        Follow up:     Follow up in about 2 weeks (around 2/19/2025) for Follow up with Dr REYES for new insulin dependent diabetes. In addition to their scheduled follow up, the patient has also been instructed to follow up on as needed basis.

## 2025-02-06 ENCOUNTER — PATIENT OUTREACH (OUTPATIENT)
Facility: CLINIC | Age: 60
End: 2025-02-06
Payer: COMMERCIAL

## 2025-02-06 NOTE — PROGRESS NOTES
Population Health Outreach.     Future Appointments      FEB 19 2025 10:20 AM - Established Patient Visit  [facility] - Edi Roldan DO           Health Maintenance Topic(s) Outreach Outcomes & Actions Taken:    Cervical Cancer Screening - Outreach Outcomes & Actions Taken  : Pt Declined Scheduling Pap Smear/HPV           APR 2 2025 10:00 AM - Education  [facility] [place], [place]      Additional Notes:  Outside Immunizations Updated (2024 Flu and Covid )

## 2025-02-14 DIAGNOSIS — K21.9 GASTROESOPHAGEAL REFLUX DISEASE, UNSPECIFIED WHETHER ESOPHAGITIS PRESENT: Primary | ICD-10-CM

## 2025-02-17 RX ORDER — ESOMEPRAZOLE MAGNESIUM 40 MG/1
40 CAPSULE, DELAYED RELEASE ORAL
Qty: 30 CAPSULE | Refills: 9 | Status: SHIPPED | OUTPATIENT
Start: 2025-02-17

## 2025-02-19 ENCOUNTER — OFFICE VISIT (OUTPATIENT)
Dept: FAMILY MEDICINE | Facility: CLINIC | Age: 60
End: 2025-02-19
Payer: COMMERCIAL

## 2025-02-19 VITALS
OXYGEN SATURATION: 98 % | DIASTOLIC BLOOD PRESSURE: 68 MMHG | HEART RATE: 78 BPM | BODY MASS INDEX: 25.93 KG/M2 | RESPIRATION RATE: 19 BRPM | HEIGHT: 65 IN | SYSTOLIC BLOOD PRESSURE: 126 MMHG | TEMPERATURE: 98 F | WEIGHT: 155.63 LBS

## 2025-02-19 DIAGNOSIS — Z79.4 TYPE 2 DIABETES MELLITUS WITH HYPERGLYCEMIA, WITH LONG-TERM CURRENT USE OF INSULIN: Primary | ICD-10-CM

## 2025-02-19 DIAGNOSIS — E11.42 DIABETIC POLYNEUROPATHY ASSOCIATED WITH TYPE 2 DIABETES MELLITUS: ICD-10-CM

## 2025-02-19 DIAGNOSIS — J43.9 PULMONARY EMPHYSEMA, UNSPECIFIED EMPHYSEMA TYPE: ICD-10-CM

## 2025-02-19 DIAGNOSIS — E11.65 TYPE 2 DIABETES MELLITUS WITH HYPERGLYCEMIA, WITH LONG-TERM CURRENT USE OF INSULIN: Primary | ICD-10-CM

## 2025-02-19 DIAGNOSIS — E11.65 TYPE 2 DIABETES MELLITUS WITH HYPERGLYCEMIA, WITHOUT LONG-TERM CURRENT USE OF INSULIN: Primary | ICD-10-CM

## 2025-02-19 RX ORDER — INSULIN ASPART 100 [IU]/ML
4 INJECTION, SOLUTION INTRAVENOUS; SUBCUTANEOUS
Qty: 3.6 ML | Refills: 2 | Status: SHIPPED | OUTPATIENT
Start: 2025-02-19 | End: 2026-02-19

## 2025-02-19 RX ORDER — BLOOD-GLUCOSE SENSOR
1 EACH MISCELLANEOUS
Qty: 5 EACH | Refills: 11 | Status: SHIPPED | OUTPATIENT
Start: 2025-02-19 | End: 2026-02-19

## 2025-02-19 RX ORDER — BLOOD-GLUCOSE,RECEIVER,CONT
1 EACH MISCELLANEOUS CONTINUOUS
Qty: 1 EACH | Refills: 0 | Status: SHIPPED | OUTPATIENT
Start: 2025-02-19 | End: 2026-02-19

## 2025-02-19 NOTE — PROGRESS NOTES
"   Patient ID: [id number]     Chief Complaint: Diabetes (2 week follow up.  "Sugars are still going up and down")    HPI:     Jocelyne Dickinson is a 60 y.o. female here today for Diabetes (2 week follow up.  "Sugars are still going up and down"). Overall tolerating the introduction of lantus 20 units daily.      -------------------------------------    Abdominal pain    Anxiety disorder, unspecified    Carotid artery stenosis    Cellulitis of scalp    resolved    COPD (chronic obstructive pulmonary disease)    Diabetes mellitus    Digestive disorder    acid reflux    Disorder of pancreas    Disorder of pancreatic duct    Emphysema, unspecified    Fatigue    Gastrointestinal tract imaging abnormality    HTN (hypertension)    Mixed hyperlipidemia    Neuropathy    Osteoarthritis    Pancreatitis    Personal history of nicotine dependence    Pneumonia, unspecified organism    Shortness of breath    Syncope    Weight loss        Past Surgical History:   Procedure Laterality Date    CATARACT EXTRACTION      COLONOSCOPY  07/02/2021    Dr. Nila Sharma    EGD, WITH CLOSED BIOPSY  03/12/2024    Procedure: EGD, WITH CLOSED BIOPSY;  Surgeon: Diego Atkinson MD;  Location: [facility] OR;  Service: Gastroenterology;;  CBD 5 mm, HOP Cyst 4.5mm x 5 mm, Chronic Pancreatitis, PD Head 2.2 mm, PD Body 4.2 mm,    EYE SURGERY  2022    Cataract surgery on both eyes    REPAIR OF CAROTID ARTERY      TONSILLECTOMY  1969    I was 4 years old when tonsils were removed    ULTRASOUND, ENDOSCOPIC, WITH FINE NEEDLE ASPIRATION N/A 03/12/2024    Procedure: UPPER EUS W/ POSS FNA;  Surgeon: Diego Atkinson MD;  Location: [facility] OR;  Service: Gastroenterology;  Laterality: N/A;  MRI abd- Limited exam due to image quality. Overall pancreatic atrophy. MAin PD dilation to 9mm in the tail with dialtied side branches. Rerlative abrupt caliber change of main PD at body and tail junction.    WRIST SURGERY Bilateral        Review of patient's allergies indicates: "   Allergen Reactions    Aller ext-american cockroach Itching and Other (See Comments)    Allerg ext-tree poll-red maple Itching and Other (See Comments)    Cat hair standardized allergenic extract Itching and Other (See Comments)    Dog hair standardized allergenic extract Itching and Other (See Comments)    Grass pollen-ruslan, standard Itching and Other (See Comments)    Horse dander Itching and Other (See Comments)    Tree pollen-box elder Itching and Other (See Comments)    Tree pollen-pecan Itching and Other (See Comments)    Trulicity [dulaglutide] Other (See Comments)     Patient had pancreatitis         Outpatient Medications Marked as Taking for the 25 encounter (Office Visit) with Edi Roldan,    Medication Sig Dispense Refill    albuterol (PROVENTIL/VENTOLIN HFA) 90 mcg/actuation inhaler inhale TWO puffs EVERY 4 TO 6 HOURS AS NEEDED FOR wheezing 6.7 g 6    ALPRAZolam (XANAX) 0.5 MG tablet TAKE ONE TABLET BY MOUTH TWICE DAILY 60 tablet 5    atorvastatin (LIPITOR) 40 MG tablet Take 40 mg by mouth once daily.      azelastine (ASTELIN) 137 mcg (0.1 %) nasal spray SMARTSI-2 Spray(s) Both Nares Twice Daily      budesonide-formoterol 160-4.5 mcg (SYMBICORT) 160-4.5 mcg/actuation HFAA INHALE TWO PUFFS TWICE A DAY 10.2 g 6    citalopram (CELEXA) 10 MG tablet TAKE ONE TABLET BY MOUTH ONCE DAILY WITH 20 MG 30 tablet 2    citalopram (CELEXA) 20 MG tablet TAKE ONE TABLET BY MOUTH EVERY DAY WITH 10 MG 30 tablet 0    clopidogreL (PLAVIX) 75 mg tablet TAKE ONE TABLET BY MOUTH DAILY 30 tablet 4    esomeprazole (NEXIUM) 40 MG capsule TAKE ONE CAPSULE BY MOUTH BEFORE breakfast 30 capsule 9    gabapentin (NEURONTIN) 300 MG capsule TAKE ONE CAPSULE BY MOUTH EVERY EVENING 30 capsule 11    glimepiride (AMARYL) 2 MG tablet TAKE ONE TABLET BY MOUTH TWICE DAILY FOR DIABETES 180 tablet 3    hydrocortisone 2.5 % cream SMARTSIG:sparingly Topical 3 Times Daily      hydrOXYzine pamoate (VISTARIL) 25 MG Cap TAKE ONE  "CAPSULE BY MOUTH DAILY 30 capsule 5    insulin glargine U-100, Lantus, (LANTUS SOLOSTAR U-100 INSULIN) 100 unit/mL (3 mL) InPn pen Inject 20 Units into the skin once daily. 6 mL 11    losartan (COZAAR) 100 MG tablet TAKE 1 TABLET BY MOUTH DAILY FOR BLOOD PRESSURE 30 tablet 5    mirtazapine (REMERON) 15 MG tablet TAKE 1/2 TABLET BY MOUTH DAILY EVERY EVENING 15 tablet 11    NIFEdipine (PROCARDIA-XL) 60 MG (OSM) 24 hr tablet TAKE ONE TABLET BY MOUTH DAILY FOR BLOOD PRESSURE 30 tablet 5    pen needle, diabetic 31 gauge x 3/16" Ndle 1 Pen by Misc.(Non-Drug; Combo Route) route Daily. 100 each 1    pioglitazone (ACTOS) 30 MG tablet TAKE ONE TABLET BY MOUTH DAILY 30 tablet 2    SPIRIVA WITH HANDIHALER 18 mcg inhalation capsule Inhale 1 capsule into the lungs Daily.      sucralfate (CARAFATE) 100 mg/mL suspension Take 10 mLs (1 g total) by mouth as needed. 414 mL 6    TRADJENTA 5 mg Tab tablet TAKE ONE TABLET BY MOUTH DAILY 30 tablet 5       Social History[1]     Family History   Problem Relation Name Age of Onset    Hypertension Mother      Hypertension Father Yordy Dartez     Heart disease Father Yordy Dartez     Cancer Father Yordy Dartez     Stroke Father Yordy Dartez     Cancer Maternal Grandmother      Vision loss Maternal Grandmother          Patient Care Team:  Edi Roldan DO as PCP - General (Family Medicine)  Drew Gardiner MD as Consulting Physician (Otolaryngology)  Ricky Foster MD as Consulting Physician (Pulmonary Disease)  Solomon Kolb MD as Consulting Physician (Cardiology)  Diego Atkinson MD as Consulting Physician (Gastroenterology)  Radha Pedraza LPN as Care Coordinator     Subjective:     Review of Systems   Constitutional:  Negative for chills and fever.   Respiratory:  Negative for shortness of breath.    Cardiovascular:  Negative for chest pain.   Gastrointestinal:  Negative for constipation and diarrhea.   Neurological:  Negative for headaches.   Psychiatric/Behavioral: " " The patient does not have insomnia.        See HPI for details  All Other ROS: Negative except as stated in HPI.       Objective:     /68 (BP Location: Left arm, Patient Position: Sitting)   Pulse 78   Temp 98 °F (36.7 °C)   Resp 19   Ht 5' 5" (1.651 m)   Wt 70.6 kg (155 lb 9.6 oz)   SpO2 98%   BMI 25.89 kg/m²     Physical Exam  Vitals reviewed.   Constitutional:       General: She is not in acute distress.     Appearance: Normal appearance.   Cardiovascular:      Rate and Rhythm: Normal rate and regular rhythm.      Heart sounds: No murmur heard.     No friction rub. No gallop.   Pulmonary:      Effort: No respiratory distress.      Breath sounds: No wheezing, rhonchi or rales.   Musculoskeletal:         General: No swelling, tenderness or deformity.      Right lower leg: No edema.      Left lower leg: No edema.   Skin:     General: Skin is warm and dry.      Findings: No lesion or rash.   Neurological:      General: No focal deficit present.      Mental Status: She is alert.   Psychiatric:         Mood and Affect: Mood normal.         Assessment/Plan:     1. Type 2 diabetes mellitus with hyperglycemia, with long-term current use of insulin  -     Lipid Panel; Future; Expected date: 02/19/2025  -     blood-glucose sensor (DEXCOM G7 SENSOR) Kristen; 1 each by Misc.(Non-Drug; Combo Route) route every 10 days.  Dispense: 5 each; Refill: 11  -     blood-glucose meter,continuous (DEXCOM G7 ) Misc; 1 each by Misc.(Non-Drug; Combo Route) route continuous.  Dispense: 1 each; Refill: 0  -     insulin lispro 200 unit/mL (3 mL) InPn; Inject 4 Units into the skin 3 (three) times daily before meals.  Dispense: 6 mL; Refill: 11   -Patient requires the use of Dexcom G7 CGM to treat her new insulin dependent type 2 diabetes.   2. Diabetic polyneuropathy associated with type 2 diabetes mellitus  -currently on gabapentin 300mg qHS  3. Pulmonary emphysema, unspecified emphysema type  -followed by pulmonology. On " Spiriva daily.     Follow up:     Follow up in about 2 weeks (around 3/5/2025) for Follow up diabetes. In addition to their scheduled follow up, the patient has also been instructed to follow up on as needed basis.              [1]   Social History  Socioeconomic History    Marital status: Single   Tobacco Use    Smoking status: Former     Current packs/day: 1.50     Average packs/day: 1.5 packs/day for 35.0 years (52.5 ttl pk-yrs)     Types: Cigarettes, Vaping with nicotine     Start date: 2016    Smokeless tobacco: Never    Tobacco comments:     Dont remember the dates   Substance and Sexual Activity    Alcohol use: Not Currently     Comment: Beer occasionally    Drug use: Never    Sexual activity: Not Currently     Social Drivers of Health     Financial Resource Strain: Medium Risk (2/17/2025)    Overall Financial Resource Strain (CARDIA)     Difficulty of Paying Living Expenses: Somewhat hard   Food Insecurity: No Food Insecurity (2/17/2025)    Hunger Vital Sign     Worried About Running Out of Food in the Last Year: Never true     Ran Out of Food in the Last Year: Never true   Transportation Needs: No Transportation Needs (2/17/2025)    PRAPARE - Transportation     Lack of Transportation (Medical): No     Lack of Transportation (Non-Medical): No   Physical Activity: Unknown (2/17/2025)    Exercise Vital Sign     Days of Exercise per Week: Patient declined   Stress: Stress Concern Present (2/17/2025)    [employer] - Occupational Stress Questionnaire     Feeling of Stress : To some extent   Housing Stability: Low Risk  (2/17/2025)    Housing Stability Vital Sign     Unable to Pay for Housing in the Last Year: No     Number of Times Moved in the Last Year: 0     Homeless in the Last Year: No

## 2025-02-21 ENCOUNTER — TELEPHONE (OUTPATIENT)
Dept: PHARMACY | Facility: CLINIC | Age: 60
End: 2025-02-21
Payer: COMMERCIAL

## 2025-02-21 NOTE — TELEPHONE ENCOUNTER
[facility]/Population Health Chart Review & Patient Outreach Details For Medication Adherence Project    Reason for Outreach Encounter: 3rd Party payor non-compliance report (Humana, BCBS, [employer]HC, etc)  2.  Patient Outreach Method: Reviewed patient chart   3.   Medication in question:    Hyperlipidemia Medications              atorvastatin (LIPITOR) 40 MG tablet Take 40 mg by mouth once daily.                  atorvastatin  last filled  1/31 for 30 day supply      4.  Reviewed and or Updates Made To: Patient Chart  5. Outreach Outcomes and/or actions taken: Patient filled medication and is on track to be adherent  Additional Notes:

## 2025-02-22 NOTE — TELEPHONE ENCOUNTER
[facility]/Population Health Chart Review & Patient Outreach Details For Medication Adherence Project    Reason for Outreach Encounter: 3rd Party payor non-compliance report (Humana, BCBS, C, etc)  2.  Patient Outreach Method: Reviewed patient chart   3.   Medication in question:    Hypertension Medications              losartan (COZAAR) 100 MG tablet TAKE 1 TABLET BY MOUTH DAILY FOR BLOOD PRESSURE    NIFEdipine (PROCARDIA-XL) 60 MG (OSM) 24 hr tablet TAKE ONE TABLET BY MOUTH DAILY FOR BLOOD PRESSURE              LF 30 ds 1/31/25    4.  Reviewed and or Updates Made To: Patient Chart  5. Outreach Outcomes and/or actions taken: Patient filled medication and is on track to be adherent  Additional Notes:

## 2025-03-01 ENCOUNTER — TELEPHONE (OUTPATIENT)
Dept: PHARMACY | Facility: CLINIC | Age: 60
End: 2025-03-01
Payer: COMMERCIAL

## 2025-03-01 NOTE — TELEPHONE ENCOUNTER
[facility]Population Health Chart Review & Patient Outreach Details For Medication Adherence Project    Reason for Outreach Encounter: 3rd Party payor non-compliance report (Humana, BCBS, C, etc)  2.  Patient Outreach Method: Reviewed patient chart   3.   Medication in question:    Diabetes Medications              glimepiride (AMARYL) 2 MG tablet TAKE ONE TABLET BY MOUTH TWICE DAILY FOR DIABETES    insulin aspart U-100 (NOVOLOG FLEXPEN U-100 INSULIN) 100 unit/mL (3 mL) InPn pen Inject 4 Units into the skin 3 (three) times daily with meals.    insulin glargine U-100, Lantus, (LANTUS SOLOSTAR U-100 INSULIN) 100 unit/mL (3 mL) InPn pen Inject 20 Units into the skin once daily.    pioglitazone (ACTOS) 30 MG tablet TAKE ONE TABLET BY MOUTH DAILY    TRADJENTA 5 mg Tab tablet TAKE ONE TABLET BY MOUTH DAILY                 Glimerpiride  last filled  1/31/25 for 30 day supply      4.  Reviewed and or Updates Made To: Patient Chart  5. Outreach Outcomes and/or actions taken: Patient filled medication and is on track to be adherent  Additional Notes:

## 2025-03-11 ENCOUNTER — OFFICE VISIT (OUTPATIENT)
Dept: FAMILY MEDICINE | Facility: CLINIC | Age: 60
End: 2025-03-11
Payer: COMMERCIAL

## 2025-03-11 VITALS
WEIGHT: 151.38 LBS | HEART RATE: 71 BPM | RESPIRATION RATE: 18 BRPM | OXYGEN SATURATION: 98 % | DIASTOLIC BLOOD PRESSURE: 72 MMHG | TEMPERATURE: 97 F | HEIGHT: 65 IN | SYSTOLIC BLOOD PRESSURE: 134 MMHG | BODY MASS INDEX: 25.22 KG/M2

## 2025-03-11 DIAGNOSIS — L29.9 PRURITUS: ICD-10-CM

## 2025-03-11 DIAGNOSIS — E11.65 TYPE 2 DIABETES MELLITUS WITH HYPERGLYCEMIA, WITHOUT LONG-TERM CURRENT USE OF INSULIN: Primary | ICD-10-CM

## 2025-03-11 DIAGNOSIS — I65.22 LEFT CAROTID ARTERY STENOSIS: ICD-10-CM

## 2025-03-11 DIAGNOSIS — F41.9 ANXIETY AND DEPRESSION: ICD-10-CM

## 2025-03-11 DIAGNOSIS — F32.A ANXIETY AND DEPRESSION: ICD-10-CM

## 2025-03-11 DIAGNOSIS — E11.65 TYPE 2 DIABETES MELLITUS WITH HYPERGLYCEMIA, WITHOUT LONG-TERM CURRENT USE OF INSULIN: ICD-10-CM

## 2025-03-11 DIAGNOSIS — I10 ESSENTIAL HYPERTENSION: ICD-10-CM

## 2025-03-11 RX ORDER — CLOPIDOGREL BISULFATE 75 MG/1
75 TABLET ORAL
Qty: 30 TABLET | Refills: 4 | Status: SHIPPED | OUTPATIENT
Start: 2025-03-11

## 2025-03-11 RX ORDER — GLIMEPIRIDE 2 MG/1
2 TABLET ORAL 2 TIMES DAILY
Qty: 180 TABLET | Refills: 5 | Status: SHIPPED | OUTPATIENT
Start: 2025-03-11

## 2025-03-11 RX ORDER — HYDROXYZINE PAMOATE 25 MG/1
25 CAPSULE ORAL
Qty: 30 CAPSULE | Refills: 5 | Status: SHIPPED | OUTPATIENT
Start: 2025-03-11

## 2025-03-11 RX ORDER — NIFEDIPINE 60 MG/1
60 TABLET, EXTENDED RELEASE ORAL
Qty: 30 TABLET | Refills: 5 | Status: SHIPPED | OUTPATIENT
Start: 2025-03-11

## 2025-03-11 RX ORDER — PIOGLITAZONEHYDROCHLORIDE 30 MG/1
30 TABLET ORAL
Qty: 30 TABLET | Refills: 2 | Status: SHIPPED | OUTPATIENT
Start: 2025-03-11

## 2025-03-11 RX ORDER — ALPRAZOLAM 0.5 MG/1
0.5 TABLET ORAL 2 TIMES DAILY
Qty: 60 TABLET | Refills: 5 | Status: SHIPPED | OUTPATIENT
Start: 2025-03-11

## 2025-03-11 NOTE — PROGRESS NOTES
"   Patient ID: [id number]     Chief Complaint: Diabetes (2 week follow up. " Showing a little bit of improvement" ) and Cough (Started last Monday. Non productive.)    HPI:     Jocelyne Dickinson is a 60 y.o. female here today for Diabetes (2 week follow up. " Showing a little bit of improvement" ) and Cough (Started last Monday. Non productive.). Glucose appears to be averaging close to 150 according to her dexcom unit for the last 2 weeks. Some low glucose readings ranging from 50-70 and some high readings in the 300s.       History of Present Illness               -------------------------------------    Abdominal pain    Anxiety disorder, unspecified    Carotid artery stenosis    Cellulitis of scalp    resolved    COPD (chronic obstructive pulmonary disease)    Diabetes mellitus    Digestive disorder    acid reflux    Disorder of pancreas    Disorder of pancreatic duct    Emphysema, unspecified    Fatigue    Gastrointestinal tract imaging abnormality    HTN (hypertension)    Mixed hyperlipidemia    Neuropathy    Osteoarthritis    Pancreatitis    Personal history of nicotine dependence    Pneumonia, unspecified organism    Shortness of breath    Syncope    Weight loss        Past Surgical History:   Procedure Laterality Date    CATARACT EXTRACTION      COLONOSCOPY  07/02/2021    Dr. Nila Sharma    EGD, WITH CLOSED BIOPSY  03/12/2024    Procedure: EGD, WITH CLOSED BIOPSY;  Surgeon: Diego Atkinson MD;  Location: [facility];  Service: Gastroenterology;;  CBD 5 mm, HOP Cyst 4.5mm x 5 mm, Chronic Pancreatitis, PD Head 2.2 mm, PD Body 4.2 mm,    EYE SURGERY  2022    Cataract surgery on both eyes    REPAIR OF CAROTID ARTERY      TONSILLECTOMY  1969    I was 4 years old when tonsils were removed    ULTRASOUND, ENDOSCOPIC, WITH FINE NEEDLE ASPIRATION N/A 03/12/2024    Procedure: UPPER EUS W/ POSS FNA;  Surgeon: Diego Atkinson MD;  Location: [facility] OR;  Service: Gastroenterology;  Laterality: N/A;  MRI abd- Limited exam due " to image quality. Overall pancreatic atrophy. MAin PD dilation to 9mm in the tail with dialtied side branches. Rerlative abrupt caliber change of main PD at body and tail junction.    WRIST SURGERY Bilateral        Review of patient's allergies indicates:   Allergen Reactions    Aller ext-american cockroach Itching and Other (See Comments)    Allerg ext-tree poll-red maple Itching and Other (See Comments)    Cat hair standardized allergenic extract Itching and Other (See Comments)    Dog hair standardized allergenic extract Itching and Other (See Comments)    Grass pollen-ruslan, standard Itching and Other (See Comments)    Horse dander Itching and Other (See Comments)    Tree pollen-box elder Itching and Other (See Comments)    Tree pollen-pecan Itching and Other (See Comments)    Trulicity [dulaglutide] Other (See Comments)     Patient had pancreatitis         Outpatient Medications Marked as Taking for the 3/11/25 encounter (Office Visit) with Edi Roldan DO   Medication Sig Dispense Refill    albuterol (PROVENTIL/VENTOLIN HFA) 90 mcg/actuation inhaler inhale TWO puffs EVERY 4 TO 6 HOURS AS NEEDED FOR wheezing 6.7 g 6    atorvastatin (LIPITOR) 40 MG tablet Take 40 mg by mouth once daily.      azelastine (ASTELIN) 137 mcg (0.1 %) nasal spray SMARTSI-2 Spray(s) Both Nares Twice Daily      blood-glucose meter,continuous (DEXCOM G7 ) Misc 1 each by Misc.(Non-Drug; Combo Route) route continuous. 1 each 0    blood-glucose sensor (DEXCOM G7 SENSOR) Kristen 1 each by Misc.(Non-Drug; Combo Route) route every 10 days. 5 each 11    budesonide-formoterol 160-4.5 mcg (SYMBICORT) 160-4.5 mcg/actuation HFAA INHALE TWO PUFFS TWICE A DAY 10.2 g 6    citalopram (CELEXA) 10 MG tablet TAKE ONE TABLET BY MOUTH ONCE DAILY WITH 20 MG 30 tablet 2    citalopram (CELEXA) 20 MG tablet TAKE ONE TABLET BY MOUTH EVERY DAY WITH 10 MG 30 tablet 0    clopidogreL (PLAVIX) 75 mg tablet TAKE ONE TABLET BY MOUTH DAILY 30 tablet 4     "esomeprazole (NEXIUM) 40 MG capsule TAKE ONE CAPSULE BY MOUTH BEFORE breakfast 30 capsule 9    gabapentin (NEURONTIN) 300 MG capsule TAKE ONE CAPSULE BY MOUTH EVERY EVENING 30 capsule 11    glimepiride (AMARYL) 2 MG tablet TAKE ONE TABLET BY MOUTH TWICE DAILY FOR DIABETES 180 tablet 3    hydrocortisone 2.5 % cream SMARTSIG:sparingly Topical 3 Times Daily      hydrOXYzine pamoate (VISTARIL) 25 MG Cap TAKE ONE CAPSULE BY MOUTH DAILY 30 capsule 5    insulin aspart U-100 (NOVOLOG FLEXPEN U-100 INSULIN) 100 unit/mL (3 mL) InPn pen Inject 4 Units into the skin 3 (three) times daily with meals. 3.6 mL 2    insulin glargine U-100, Lantus, (LANTUS SOLOSTAR U-100 INSULIN) 100 unit/mL (3 mL) InPn pen Inject 20 Units into the skin once daily. 6 mL 11    losartan (COZAAR) 100 MG tablet TAKE 1 TABLET BY MOUTH DAILY FOR BLOOD PRESSURE 30 tablet 5    mirtazapine (REMERON) 15 MG tablet TAKE 1/2 TABLET BY MOUTH DAILY EVERY EVENING 15 tablet 11    NIFEdipine (PROCARDIA-XL) 60 MG (OSM) 24 hr tablet TAKE ONE TABLET BY MOUTH DAILY FOR BLOOD PRESSURE 30 tablet 5    pen needle, diabetic 31 gauge x 3/16" Ndle 1 Pen by Misc.(Non-Drug; Combo Route) route Daily. 100 each 1    pioglitazone (ACTOS) 30 MG tablet TAKE ONE TABLET BY MOUTH DAILY 30 tablet 2    SPIRIVA WITH HANDIHALER 18 mcg inhalation capsule Inhale 1 capsule into the lungs Daily.      sucralfate (CARAFATE) 100 mg/mL suspension Take 10 mLs (1 g total) by mouth as needed. 414 mL 6    TRADJENTA 5 mg Tab tablet TAKE ONE TABLET BY MOUTH DAILY 30 tablet 5    [DISCONTINUED] ALPRAZolam (XANAX) 0.5 MG tablet TAKE ONE TABLET BY MOUTH TWICE DAILY 60 tablet 5       Social History[1]     Family History   Problem Relation Name Age of Onset    Hypertension Mother      Hypertension Father Yordy Dartez     Heart disease Father Yordy Dartez     Cancer Father Yordy Dartez     Stroke Father Yordy Dartez     Cancer Maternal Grandmother      Vision loss Maternal Grandmother          Patient Care " "Team:  Edi Roldan DO as PCP - General (Family Medicine)  Drew Gardiner MD as Consulting Physician (Otolaryngology)  Ricky Foster MD as Consulting Physician (Pulmonary Disease)  Solomon Kolb MD as Consulting Physician (Cardiology)  Diego Atkinson MD as Consulting Physician (Gastroenterology)  Radha Pedraza LPN as Care Coordinator     Subjective:     Review of Systems   Constitutional:  Negative for chills and fever.   Respiratory:  Positive for cough, shortness of breath and wheezing.    Cardiovascular:  Negative for chest pain.   Gastrointestinal:  Negative for constipation and diarrhea.   Neurological:  Negative for headaches.   Psychiatric/Behavioral:  The patient does not have insomnia.        See HPI for details  All Other ROS: Negative except as stated in HPI.       Objective:     /72 (BP Location: Left arm, Patient Position: Sitting)   Pulse 71   Temp 97.1 °F (36.2 °C)   Resp 18   Ht 5' 5" (1.651 m)   Wt 68.7 kg (151 lb 6.4 oz)   SpO2 98%   BMI 25.19 kg/m²     Physical Exam  Vitals reviewed.   Constitutional:       General: She is not in acute distress.     Appearance: Normal appearance.   Cardiovascular:      Rate and Rhythm: Normal rate and regular rhythm.      Heart sounds: No murmur heard.     No friction rub. No gallop.   Pulmonary:      Effort: No respiratory distress.      Breath sounds: Wheezing present. No rhonchi or rales.   Musculoskeletal:         General: No swelling, tenderness or deformity.      Right lower leg: No edema.      Left lower leg: No edema.   Skin:     General: Skin is warm and dry.      Findings: No lesion or rash.   Neurological:      General: No focal deficit present.      Mental Status: She is alert.   Psychiatric:         Mood and Affect: Mood normal.         Assessment/Plan:     1. Type 2 diabetes mellitus with hyperglycemia, without long-term current use of insulin  -continue lantus at 20 units daily, novolog 4 units TID. Change " glimepiride 2mg to once daily to try ot reduce occurences of low glucose levels while patient is sleeping.     Advised patient that if she starts steroids to expect her glucose levels to be elevated for a period of time after.     2. Anxiety and depression  -alprazolam 0.5mg BID        Assessment & Plan               This note was generated with the assistance of ambient listening technology. Verbal consent was obtained by the patient and accompanying visitor(s) for the recording of patient appointment to facilitate this note. I attest to having reviewed and edited the generated note for accuracy, though some syntax or spelling errors may persist. Please contact the author of this note for any clarification.     Follow up:     Follow up in about 8 weeks (around 5/6/2025) for Follow up diabetes with labs. In addition to their scheduled follow up, the patient has also been instructed to follow up on as needed basis.                [1]   Social History  Socioeconomic History    Marital status: Single   Tobacco Use    Smoking status: Former     Current packs/day: 1.50     Average packs/day: 1.5 packs/day for 35.0 years (52.5 ttl pk-yrs)     Types: Cigarettes, Vaping with nicotine     Start date: 2016    Smokeless tobacco: Never    Tobacco comments:     Dont remember the dates   Substance and Sexual Activity    Alcohol use: Not Currently     Comment: Beer occasionally    Drug use: Never    Sexual activity: Not Currently     Social Drivers of Health     Financial Resource Strain: Medium Risk (2/17/2025)    Overall Financial Resource Strain (CARDIA)     Difficulty of Paying Living Expenses: Somewhat hard   Food Insecurity: No Food Insecurity (2/17/2025)    Hunger Vital Sign     Worried About Running Out of Food in the Last Year: Never true     Ran Out of Food in the Last Year: Never true   Transportation Needs: No Transportation Needs (2/17/2025)    PRAPARE - Transportation     Lack of Transportation (Medical): No      Lack of Transportation (Non-Medical): No   Physical Activity: Insufficiently Active (3/11/2025)    Exercise Vital Sign     Days of Exercise per Week: 1 day     Minutes of Exercise per Session: 20 min   Stress: Stress Concern Present (2/17/2025)    [employer] [place] - Occupational Stress Questionnaire     Feeling of Stress : To some extent   Housing Stability: Low Risk  (2/17/2025)    Housing Stability Vital Sign     Unable to Pay for Housing in the Last Year: No     Number of Times Moved in the Last Year: 0     Homeless in the Last Year: No

## 2025-03-24 ENCOUNTER — TELEPHONE (OUTPATIENT)
Dept: PHARMACY | Facility: CLINIC | Age: 60
End: 2025-03-24
Payer: COMMERCIAL

## 2025-03-24 NOTE — TELEPHONE ENCOUNTER
[facility]/Population Health Chart Review & Patient Outreach Details For Medication Adherence Project    Reason for Outreach Encounter: 3rd Party payor non-compliance report ([employer], [employer], [employer], etc)  2.  Patient Outreach Method: Reviewed patient chart   3.   Medication in question:    Hyperlipidemia Medications              atorvastatin (LIPITOR) 40 MG tablet Take 40 mg by mouth once daily.                  LF 30 ds 3/11/25    4.  Reviewed and or Updates Made To: Patient Chart  5. Outreach Outcomes and/or actions taken: Patient filled medication and is on track to be adherent  Additional Notes:

## 2025-04-02 ENCOUNTER — CLINICAL SUPPORT (OUTPATIENT)
Facility: CLINIC | Age: 60
End: 2025-04-02
Payer: COMMERCIAL

## 2025-04-02 VITALS — HEIGHT: 65 IN | BODY MASS INDEX: 25.63 KG/M2 | WEIGHT: 153.81 LBS

## 2025-04-02 DIAGNOSIS — E11.65 TYPE 2 DIABETES MELLITUS WITH HYPERGLYCEMIA, WITHOUT LONG-TERM CURRENT USE OF INSULIN: ICD-10-CM

## 2025-04-02 PROCEDURE — G0108 DIAB MANAGE TRN  PER INDIV: HCPCS | Mod: S$GLB,,, | Performed by: FAMILY MEDICINE

## 2025-04-10 ENCOUNTER — OFFICE VISIT (OUTPATIENT)
Dept: CARDIAC SURGERY | Facility: CLINIC | Age: 60
End: 2025-04-10
Payer: COMMERCIAL

## 2025-04-10 VITALS
DIASTOLIC BLOOD PRESSURE: 70 MMHG | WEIGHT: 159 LBS | HEART RATE: 87 BPM | OXYGEN SATURATION: 98 % | HEIGHT: 65 IN | BODY MASS INDEX: 26.49 KG/M2 | SYSTOLIC BLOOD PRESSURE: 123 MMHG

## 2025-04-10 DIAGNOSIS — R91.1 NODULE OF APEX OF RIGHT LUNG: Primary | ICD-10-CM

## 2025-04-10 RX ORDER — HYDROCODONE BITARTRATE AND ACETAMINOPHEN 500; 5 MG/1; MG/1
TABLET ORAL
OUTPATIENT
Start: 2025-04-10

## 2025-04-10 NOTE — H&P (VIEW-ONLY)
[facility]  Cardiothoracic Surgery  Consult Note    Patient Name: Jocelyne Dickinson  MRN: [id number]  Date of Service: 4/10/2025  Referring Provider: No ref. provider found    Patient information was obtained from patient, past medical records, and primary team    Subjective:     Chief Complaint   Patient presents with    Pre-op Exam       History of Present Illness: Patient is a 60-year-old smoker (vaping) who comes with a recent finding of right upper lobe nodule on CT scan measuring 1.3 cm.  PET scan reveals some area of hypermetabolism in the right hilum as well.    The patient denies fever, chills, nausea, vomiting, headache, syncope, chest pain, back pain, hemoptysis,  or any neurologic abnormalities.  She does have increasing cough.    Current Outpatient Medications on File Prior to Visit   Medication Sig    albuterol (PROVENTIL/VENTOLIN HFA) 90 mcg/actuation inhaler inhale TWO puffs EVERY 4 TO 6 HOURS AS NEEDED FOR wheezing    ALPRAZolam (XANAX) 0.5 MG tablet Take 1 tablet (0.5 mg total) by mouth 2 (two) times daily.    atorvastatin (LIPITOR) 40 MG tablet Take 40 mg by mouth once daily.    azelastine (ASTELIN) 137 mcg (0.1 %) nasal spray SMARTSI-2 Spray(s) Both Nares Twice Daily    blood-glucose meter,continuous (DEXCOM G7 ) Misc 1 each by Misc.(Non-Drug; Combo Route) route continuous.    blood-glucose sensor (DEXCOM G7 SENSOR) Kristen 1 each by Misc.(Non-Drug; Combo Route) route every 10 days.    budesonide-formoterol 160-4.5 mcg (SYMBICORT) 160-4.5 mcg/actuation HFAA INHALE TWO PUFFS TWICE A DAY    citalopram (CELEXA) 10 MG tablet TAKE ONE TABLET BY MOUTH ONCE DAILY WITH 20 MG    citalopram (CELEXA) 20 MG tablet TAKE ONE TABLET BY MOUTH EVERY DAY WITH 10 MG    clopidogreL (PLAVIX) 75 mg tablet TAKE ONE TABLET BY MOUTH DAILY    esomeprazole (NEXIUM) 40 MG capsule TAKE ONE CAPSULE BY MOUTH BEFORE breakfast    gabapentin (NEURONTIN) 300 MG capsule TAKE ONE CAPSULE BY MOUTH  "EVERY EVENING    glimepiride (AMARYL) 2 MG tablet TAKE ONE TABLET BY MOUTH TWICE DAILY FOR DIABETES    hydrocortisone 2.5 % cream SMARTSIG:sparingly Topical 3 Times Daily    hydrOXYzine pamoate (VISTARIL) 25 MG Cap TAKE ONE CAPSULE BY MOUTH DAILY    insulin aspart U-100 (NOVOLOG FLEXPEN U-100 INSULIN) 100 unit/mL (3 mL) InPn pen Inject 4 Units into the skin 3 (three) times daily with meals.    insulin glargine U-100, Lantus, (LANTUS SOLOSTAR U-100 INSULIN) 100 unit/mL (3 mL) InPn pen Inject 20 Units into the skin once daily.    losartan (COZAAR) 100 MG tablet TAKE 1 TABLET BY MOUTH DAILY FOR BLOOD PRESSURE    mirtazapine (REMERON) 15 MG tablet TAKE 1/2 TABLET BY MOUTH DAILY EVERY EVENING    NIFEdipine (PROCARDIA-XL) 60 MG (OSM) 24 hr tablet TAKE ONE TABLET BY MOUTH DAILY FOR BLOOD PRESSURE    pen needle, diabetic 31 gauge x 3/16" Ndle 1 Pen by Misc.(Non-Drug; Combo Route) route Daily.    pioglitazone (ACTOS) 30 MG tablet TAKE ONE TABLET BY MOUTH DAILY    SPIRIVA WITH HANDIHALER 18 mcg inhalation capsule Inhale 1 capsule into the lungs Daily.    sucralfate (CARAFATE) 100 mg/mL suspension Take 10 mLs (1 g total) by mouth as needed.    TRADJENTA 5 mg Tab tablet TAKE ONE TABLET BY MOUTH DAILY     No current facility-administered medications on file prior to visit.       Review of patient's allergies indicates:   Allergen Reactions    Aller ext-american cockroach Itching and Other (See Comments)    Allerg ext-tree poll-red maple Itching and Other (See Comments)    Cat hair standardized allergenic extract Itching and Other (See Comments)    Dog hair standardized allergenic extract Itching and Other (See Comments)    Grass pollen-ruslan, standard Itching and Other (See Comments)    Horse dander Itching and Other (See Comments)    Tree pollen-box elder Itching and Other (See Comments)    Tree pollen-pecan Itching and Other (See Comments)    Trulicity [dulaglutide] Other (See Comments)     Patient had pancreatitis   "       Past Medical History:   Diagnosis Date    Abdominal pain     Anxiety disorder, unspecified     Carotid artery stenosis     Cellulitis of scalp     resolved    COPD (chronic obstructive pulmonary disease)     Diabetes mellitus     Digestive disorder     acid reflux    Disorder of pancreas     Disorder of pancreatic duct     Emphysema, unspecified     Fatigue     Gastrointestinal tract imaging abnormality     HTN (hypertension)     Mixed hyperlipidemia     Neuropathy     Osteoarthritis     Pancreatitis     Personal history of nicotine dependence     Pneumonia, unspecified organism     Shortness of breath     Syncope     Weight loss      Past Surgical History:   Procedure Laterality Date    CATARACT EXTRACTION      COLONOSCOPY  07/02/2021    Dr. Nila Sharma    EGD, WITH CLOSED BIOPSY  03/12/2024    Procedure: EGD, WITH CLOSED BIOPSY;  Surgeon: Diego Atkinson MD;  Location: [facility] OR;  Service: Gastroenterology;;  CBD 5 mm, HOP Cyst 4.5mm x 5 mm, Chronic Pancreatitis, PD Head 2.2 mm, PD Body 4.2 mm,    EYE SURGERY  2022    Cataract surgery on both eyes    REPAIR OF CAROTID ARTERY      TONSILLECTOMY  1969    I was 4 years old when tonsils were removed    ULTRASOUND, ENDOSCOPIC, WITH FINE NEEDLE ASPIRATION N/A 03/12/2024    Procedure: UPPER EUS W/ POSS FNA;  Surgeon: Diego Atkinson MD;  Location: [facility] OR;  Service: Gastroenterology;  Laterality: N/A;  MRI abd- Limited exam due to image quality. Overall pancreatic atrophy. MAin PD dilation to 9mm in the tail with dialtied side branches. Rerlative abrupt caliber change of main PD at body and tail junction.    WRIST SURGERY Bilateral      Family History       Problem Relation (Age of Onset)    Cancer Father, Maternal Grandmother    Heart disease Father    Hypertension Mother, Father    Stroke Father    Vision loss Maternal Grandmother          Tobacco Use    Smoking status: Former     Types: Vaping with nicotine     Start date: 2016    Smokeless tobacco: Never     Tobacco comments:     Don't remember the dates   Substance and Sexual Activity    Alcohol use: Not Currently     Comment: Beer occasionally    Drug use: Never    Sexual activity: Not Currently     Review of Systems   Constitutional: Negative. Negative for chills, diaphoresis, fever and night sweats.   HENT:  Negative for hoarse voice, sore throat and stridor.    Eyes: Negative.  Negative for blurred vision and double vision.   Cardiovascular:  Negative for chest pain, claudication, cyanosis, dyspnea on exertion, irregular heartbeat, leg swelling, orthopnea, palpitations, paroxysmal nocturnal dyspnea and syncope.   Respiratory:  Positive for sputum production. Negative for cough, hemoptysis, shortness of breath and wheezing.    Endocrine: Negative for polydipsia and polyuria.   Hematologic/Lymphatic: Negative for adenopathy and bleeding problem.   Gastrointestinal:  Negative for abdominal pain, diarrhea, heartburn, hematemesis, hematochezia, nausea and vomiting.   Neurological:  Negative for brief paralysis, disturbances in coordination, dizziness, focal weakness, headaches, numbness and paresthesias.     Objective:     Vitals:    04/10/25 0835   BP: 123/70   Pulse: 87        Weight: 72.1 kg (159 lb)  Body mass index is 26.46 kg/m².     STS Risk Score:   Not appli    Physical Exam  Vitals and nursing note reviewed.   Constitutional:       Appearance: Normal appearance.   HENT:      Head: Normocephalic and atraumatic.      Mouth/Throat:      Mouth: Mucous membranes are moist.   Eyes:      Extraocular Movements: Extraocular movements intact.      Conjunctiva/sclera: Conjunctivae normal.      Pupils: Pupils are equal, round, and reactive to light.   Neck:      Vascular: No carotid bruit.   Cardiovascular:      Rate and Rhythm: Normal rate and regular rhythm.      Pulses: Normal pulses.      Heart sounds: Normal heart sounds. No murmur heard.     No friction rub. No gallop.   Pulmonary:      Effort: Pulmonary effort is  normal. No respiratory distress.      Breath sounds: Rhonchi and rales present. No wheezing.   Chest:      Chest wall: No mass or crepitus.   Abdominal:      General: Abdomen is flat. Bowel sounds are normal. There is no distension.      Palpations: Abdomen is soft. There is no mass.      Tenderness: There is no abdominal tenderness.   Musculoskeletal:      Cervical back: Normal range of motion and neck supple.   Lymphadenopathy:      Cervical: Cervical adenopathy present.      Right cervical: Superficial cervical adenopathy present.      Upper Body:      Right upper body: No supraclavicular, axillary or pectoral adenopathy.      Left upper body: No supraclavicular, axillary or pectoral adenopathy.   Skin:     General: Skin is warm and dry.      Capillary Refill: Capillary refill takes less than 2 seconds.      Findings: No erythema or rash.   Neurological:      General: No focal deficit present.      Mental Status: She is alert and oriented to person, place, and time. Mental status is at baseline.          Significant Labs:  Reviewed    Significant Diagnostics:  Reviewed    Assessment/Plan:     Problem List Items Addressed This Visit       Nodule of apex of right lung - Primary     Recommend right minimal access lung biopsies with right upper lobectomy and mediastinal lymph node dissection.  Risks, benefits, and alternatives have been discussed.  Questions have been answered.  The patient voices understanding and agrees to proceed.             Thank you for your consult. I will follow-up with patient. Please contact us if you have any additional questions.    AZAM Lees MD, FACC, FACS  Cardiothoracic Surgery  [facility]

## 2025-04-10 NOTE — ASSESSMENT & PLAN NOTE
Recommend right minimal access lung biopsies with right upper lobectomy and mediastinal lymph node dissection.  Risks, benefits, and alternatives have been discussed.  Questions have been answered.  The patient voices understanding and agrees to proceed.

## 2025-04-10 NOTE — PROGRESS NOTES
[facility]  Cardiothoracic Surgery  Consult Note    Patient Name: Jocelyne Dickinson  MRN: [id number]  Date of Service: 4/10/2025  Referring Provider: No ref. provider found    Patient information was obtained from patient, past medical records, and primary team    Subjective:     Chief Complaint   Patient presents with    Pre-op Exam       History of Present Illness: Patient is a 60-year-old smoker (vaping) who comes with a recent finding of right upper lobe nodule on CT scan measuring 1.3 cm.  PET scan reveals some area of hypermetabolism in the right hilum as well.    The patient denies fever, chills, nausea, vomiting, headache, syncope, chest pain, back pain, hemoptysis,  or any neurologic abnormalities.  She does have increasing cough.    Current Outpatient Medications on File Prior to Visit   Medication Sig    albuterol (PROVENTIL/VENTOLIN HFA) 90 mcg/actuation inhaler inhale TWO puffs EVERY 4 TO 6 HOURS AS NEEDED FOR wheezing    ALPRAZolam (XANAX) 0.5 MG tablet Take 1 tablet (0.5 mg total) by mouth 2 (two) times daily.    atorvastatin (LIPITOR) 40 MG tablet Take 40 mg by mouth once daily.    azelastine (ASTELIN) 137 mcg (0.1 %) nasal spray SMARTSI-2 Spray(s) Both Nares Twice Daily    blood-glucose meter,continuous (DEXCOM G7 ) Misc 1 each by Misc.(Non-Drug; Combo Route) route continuous.    blood-glucose sensor (DEXCOM G7 SENSOR) Kristen 1 each by Misc.(Non-Drug; Combo Route) route every 10 days.    budesonide-formoterol 160-4.5 mcg (SYMBICORT) 160-4.5 mcg/actuation HFAA INHALE TWO PUFFS TWICE A DAY    citalopram (CELEXA) 10 MG tablet TAKE ONE TABLET BY MOUTH ONCE DAILY WITH 20 MG    citalopram (CELEXA) 20 MG tablet TAKE ONE TABLET BY MOUTH EVERY DAY WITH 10 MG    clopidogreL (PLAVIX) 75 mg tablet TAKE ONE TABLET BY MOUTH DAILY    esomeprazole (NEXIUM) 40 MG capsule TAKE ONE CAPSULE BY MOUTH BEFORE breakfast    gabapentin (NEURONTIN) 300 MG capsule TAKE ONE CAPSULE BY MOUTH  "EVERY EVENING    glimepiride (AMARYL) 2 MG tablet TAKE ONE TABLET BY MOUTH TWICE DAILY FOR DIABETES    hydrocortisone 2.5 % cream SMARTSIG:sparingly Topical 3 Times Daily    hydrOXYzine pamoate (VISTARIL) 25 MG Cap TAKE ONE CAPSULE BY MOUTH DAILY    insulin aspart U-100 (NOVOLOG FLEXPEN U-100 INSULIN) 100 unit/mL (3 mL) InPn pen Inject 4 Units into the skin 3 (three) times daily with meals.    insulin glargine U-100, Lantus, (LANTUS SOLOSTAR U-100 INSULIN) 100 unit/mL (3 mL) InPn pen Inject 20 Units into the skin once daily.    losartan (COZAAR) 100 MG tablet TAKE 1 TABLET BY MOUTH DAILY FOR BLOOD PRESSURE    mirtazapine (REMERON) 15 MG tablet TAKE 1/2 TABLET BY MOUTH DAILY EVERY EVENING    NIFEdipine (PROCARDIA-XL) 60 MG (OSM) 24 hr tablet TAKE ONE TABLET BY MOUTH DAILY FOR BLOOD PRESSURE    pen needle, diabetic 31 gauge x 3/16" Ndle 1 Pen by Misc.(Non-Drug; Combo Route) route Daily.    pioglitazone (ACTOS) 30 MG tablet TAKE ONE TABLET BY MOUTH DAILY    SPIRIVA WITH HANDIHALER 18 mcg inhalation capsule Inhale 1 capsule into the lungs Daily.    sucralfate (CARAFATE) 100 mg/mL suspension Take 10 mLs (1 g total) by mouth as needed.    TRADJENTA 5 mg Tab tablet TAKE ONE TABLET BY MOUTH DAILY     No current facility-administered medications on file prior to visit.       Review of patient's allergies indicates:   Allergen Reactions    Aller ext-american cockroach Itching and Other (See Comments)    Allerg ext-tree poll-red maple Itching and Other (See Comments)    Cat hair standardized allergenic extract Itching and Other (See Comments)    Dog hair standardized allergenic extract Itching and Other (See Comments)    Grass pollen-ruslan, standard Itching and Other (See Comments)    Horse dander Itching and Other (See Comments)    Tree pollen-box elder Itching and Other (See Comments)    Tree pollen-pecan Itching and Other (See Comments)    Trulicity [dulaglutide] Other (See Comments)     Patient had pancreatitis   "       Past Medical History:   Diagnosis Date    Abdominal pain     Anxiety disorder, unspecified     Carotid artery stenosis     Cellulitis of scalp     resolved    COPD (chronic obstructive pulmonary disease)     Diabetes mellitus     Digestive disorder     acid reflux    Disorder of pancreas     Disorder of pancreatic duct     Emphysema, unspecified     Fatigue     Gastrointestinal tract imaging abnormality     HTN (hypertension)     Mixed hyperlipidemia     Neuropathy     Osteoarthritis     Pancreatitis     Personal history of nicotine dependence     Pneumonia, unspecified organism     Shortness of breath     Syncope     Weight loss      Past Surgical History:   Procedure Laterality Date    CATARACT EXTRACTION      COLONOSCOPY  07/02/2021    Dr. Nila Sharma    EGD, WITH CLOSED BIOPSY  03/12/2024    Procedure: EGD, WITH CLOSED BIOPSY;  Surgeon: Diego Atkinson MD;  Location: [facility] OR;  Service: Gastroenterology;;  CBD 5 mm, HOP Cyst 4.5mm x 5 mm, Chronic Pancreatitis, PD Head 2.2 mm, PD Body 4.2 mm,    EYE SURGERY  2022    Cataract surgery on both eyes    REPAIR OF CAROTID ARTERY      TONSILLECTOMY  1969    I was 4 years old when tonsils were removed    ULTRASOUND, ENDOSCOPIC, WITH FINE NEEDLE ASPIRATION N/A 03/12/2024    Procedure: UPPER EUS W/ POSS FNA;  Surgeon: Diego Atkinson MD;  Location: [facility] OR;  Service: Gastroenterology;  Laterality: N/A;  MRI abd- Limited exam due to image quality. Overall pancreatic atrophy. MAin PD dilation to 9mm in the tail with dialtied side branches. Rerlative abrupt caliber change of main PD at body and tail junction.    WRIST SURGERY Bilateral      Family History       Problem Relation (Age of Onset)    Cancer Father, Maternal Grandmother    Heart disease Father    Hypertension Mother, Father    Stroke Father    Vision loss Maternal Grandmother          Tobacco Use    Smoking status: Former     Types: Vaping with nicotine     Start date: 2016    Smokeless tobacco: Never     Tobacco comments:     Don't remember the dates   Substance and Sexual Activity    Alcohol use: Not Currently     Comment: Beer occasionally    Drug use: Never    Sexual activity: Not Currently     Review of Systems   Constitutional: Negative. Negative for chills, diaphoresis, fever and night sweats.   HENT:  Negative for hoarse voice, sore throat and stridor.    Eyes: Negative.  Negative for blurred vision and double vision.   Cardiovascular:  Negative for chest pain, claudication, cyanosis, dyspnea on exertion, irregular heartbeat, leg swelling, orthopnea, palpitations, paroxysmal nocturnal dyspnea and syncope.   Respiratory:  Positive for sputum production. Negative for cough, hemoptysis, shortness of breath and wheezing.    Endocrine: Negative for polydipsia and polyuria.   Hematologic/Lymphatic: Negative for adenopathy and bleeding problem.   Gastrointestinal:  Negative for abdominal pain, diarrhea, heartburn, hematemesis, hematochezia, nausea and vomiting.   Neurological:  Negative for brief paralysis, disturbances in coordination, dizziness, focal weakness, headaches, numbness and paresthesias.     Objective:     Vitals:    04/10/25 0835   BP: 123/70   Pulse: 87        Weight: 72.1 kg (159 lb)  Body mass index is 26.46 kg/m².     STS Risk Score:   Not appli    Physical Exam  Vitals and nursing note reviewed.   Constitutional:       Appearance: Normal appearance.   HENT:      Head: Normocephalic and atraumatic.      Mouth/Throat:      Mouth: Mucous membranes are moist.   Eyes:      Extraocular Movements: Extraocular movements intact.      Conjunctiva/sclera: Conjunctivae normal.      Pupils: Pupils are equal, round, and reactive to light.   Neck:      Vascular: No carotid bruit.   Cardiovascular:      Rate and Rhythm: Normal rate and regular rhythm.      Pulses: Normal pulses.      Heart sounds: Normal heart sounds. No murmur heard.     No friction rub. No gallop.   Pulmonary:      Effort: Pulmonary effort is  normal. No respiratory distress.      Breath sounds: Rhonchi and rales present. No wheezing.   Chest:      Chest wall: No mass or crepitus.   Abdominal:      General: Abdomen is flat. Bowel sounds are normal. There is no distension.      Palpations: Abdomen is soft. There is no mass.      Tenderness: There is no abdominal tenderness.   Musculoskeletal:      Cervical back: Normal range of motion and neck supple.   Lymphadenopathy:      Cervical: Cervical adenopathy present.      Right cervical: Superficial cervical adenopathy present.      Upper Body:      Right upper body: No supraclavicular, axillary or pectoral adenopathy.      Left upper body: No supraclavicular, axillary or pectoral adenopathy.   Skin:     General: Skin is warm and dry.      Capillary Refill: Capillary refill takes less than 2 seconds.      Findings: No erythema or rash.   Neurological:      General: No focal deficit present.      Mental Status: She is alert and oriented to person, place, and time. Mental status is at baseline.          Significant Labs:  Reviewed    Significant Diagnostics:  Reviewed    Assessment/Plan:     Problem List Items Addressed This Visit       Nodule of apex of right lung - Primary     Recommend right minimal access lung biopsies with right upper lobectomy and mediastinal lymph node dissection.  Risks, benefits, and alternatives have been discussed.  Questions have been answered.  The patient voices understanding and agrees to proceed.             Thank you for your consult. I will follow-up with patient. Please contact us if you have any additional questions.    AZAM Lees MD, FACC, FACS  Cardiothoracic Surgery  [facility]

## 2025-04-15 ENCOUNTER — HOSPITAL ENCOUNTER (OUTPATIENT)
Dept: RADIOLOGY | Facility: HOSPITAL | Age: 60
Discharge: HOME | End: 2025-04-15
Attending: THORACIC SURGERY (CARDIOTHORACIC VASCULAR SURGERY)
Payer: COMMERCIAL

## 2025-04-15 DIAGNOSIS — R91.1 NODULE OF APEX OF RIGHT LUNG: ICD-10-CM

## 2025-04-15 PROCEDURE — 71046 X-RAY EXAM CHEST 2 VIEWS: CPT | Mod: TC

## 2025-04-16 ENCOUNTER — ANESTHESIA EVENT (OUTPATIENT)
Dept: SURGERY | Facility: HOSPITAL | Age: 60
End: 2025-04-16
Payer: COMMERCIAL

## 2025-04-16 NOTE — CLINICAL REVIEW
Plavix last dose 4/17/25. labs/EKG/CXR reviewed. cardiology notes utd. Echo/Stress noted. chart review complete. ccv

## 2025-04-22 ENCOUNTER — TELEPHONE (OUTPATIENT)
Dept: PHARMACY | Facility: CLINIC | Age: 60
End: 2025-04-22
Payer: COMMERCIAL

## 2025-04-23 ENCOUNTER — ANESTHESIA (OUTPATIENT)
Dept: SURGERY | Facility: HOSPITAL | Age: 60
End: 2025-04-23
Payer: COMMERCIAL

## 2025-04-23 ENCOUNTER — HOSPITAL ENCOUNTER (INPATIENT)
Facility: HOSPITAL | Age: 60
LOS: 13 days | Discharge: HOME HEALTH | DRG: 164 | End: 2025-05-06
Attending: THORACIC SURGERY (CARDIOTHORACIC VASCULAR SURGERY) | Admitting: THORACIC SURGERY (CARDIOTHORACIC VASCULAR SURGERY)
Payer: COMMERCIAL

## 2025-04-23 DIAGNOSIS — R91.1 NODULE OF APEX OF RIGHT LUNG: Primary | ICD-10-CM

## 2025-04-23 LAB
BASOPHILS # BLD AUTO: 0.04 X10(3)/MCL
BASOPHILS NFR BLD AUTO: 0.4 %
EOSINOPHIL # BLD AUTO: 0.04 X10(3)/MCL (ref 0–0.9)
EOSINOPHIL NFR BLD AUTO: 0.4 %
ERYTHROCYTE [DISTWIDTH] IN BLOOD BY AUTOMATED COUNT: 13 % (ref 11.5–17)
HCT VFR BLD AUTO: 37.7 % (ref 37–47)
HGB BLD-MCNC: 11.9 G/DL (ref 12–16)
IMM GRANULOCYTES # BLD AUTO: 0.05 X10(3)/MCL (ref 0–0.04)
IMM GRANULOCYTES NFR BLD AUTO: 0.5 %
LYMPHOCYTES # BLD AUTO: 2.18 X10(3)/MCL (ref 0.6–4.6)
LYMPHOCYTES NFR BLD AUTO: 19.7 %
MCH RBC QN AUTO: 28.3 PG (ref 27–31)
MCHC RBC AUTO-ENTMCNC: 31.6 G/DL (ref 33–36)
MCV RBC AUTO: 89.8 FL (ref 80–94)
MONOCYTES # BLD AUTO: 0.76 X10(3)/MCL (ref 0.1–1.3)
MONOCYTES NFR BLD AUTO: 6.9 %
NEUTROPHILS # BLD AUTO: 7.97 X10(3)/MCL (ref 2.1–9.2)
NEUTROPHILS NFR BLD AUTO: 72.1 %
NRBC BLD AUTO-RTO: 0 %
PLATELET # BLD AUTO: 218 X10(3)/MCL (ref 130–400)
PMV BLD AUTO: 10.9 FL (ref 7.4–10.4)
POCT GLUCOSE: 177 MG/DL (ref 70–110)
POCT GLUCOSE: 273 MG/DL (ref 70–110)
RBC # BLD AUTO: 4.2 X10(6)/MCL (ref 4.2–5.4)
WBC # BLD AUTO: 11.04 X10(3)/MCL (ref 4.5–11.5)

## 2025-04-23 PROCEDURE — 32663 THORACOSCOPY W/LOBECTOMY: CPT | Mod: RT,,, | Performed by: THORACIC SURGERY (CARDIOTHORACIC VASCULAR SURGERY)

## 2025-04-23 PROCEDURE — 32674 THORACOSCOPY LYMPH NODE EXC: CPT | Mod: AS,RT,,

## 2025-04-23 PROCEDURE — 32674 THORACOSCOPY LYMPH NODE EXC: CPT | Mod: RT,,, | Performed by: THORACIC SURGERY (CARDIOTHORACIC VASCULAR SURGERY)

## 2025-04-23 PROCEDURE — 86923 COMPATIBILITY TEST ELECTRIC: CPT | Mod: 91 | Performed by: THORACIC SURGERY (CARDIOTHORACIC VASCULAR SURGERY)

## 2025-04-23 PROCEDURE — 71000015 HC POSTOP RECOV 1ST HR: Performed by: THORACIC SURGERY (CARDIOTHORACIC VASCULAR SURGERY)

## 2025-04-23 PROCEDURE — 32663 THORACOSCOPY W/LOBECTOMY: CPT | Mod: AS,RT,,

## 2025-04-23 PROCEDURE — 63600175 PHARM REV CODE 636 W HCPCS: Performed by: THORACIC SURGERY (CARDIOTHORACIC VASCULAR SURGERY)

## 2025-04-23 PROCEDURE — 63600175 PHARM REV CODE 636 W HCPCS: Mod: JZ,TB | Performed by: STUDENT IN AN ORGANIZED HEALTH CARE EDUCATION/TRAINING PROGRAM

## 2025-04-23 PROCEDURE — 63600175 PHARM REV CODE 636 W HCPCS

## 2025-04-23 PROCEDURE — 37000008 HC ANESTHESIA 1ST 15 MINUTES: Performed by: THORACIC SURGERY (CARDIOTHORACIC VASCULAR SURGERY)

## 2025-04-23 PROCEDURE — 63600175 PHARM REV CODE 636 W HCPCS: Performed by: NURSE ANESTHETIST, CERTIFIED REGISTERED

## 2025-04-23 PROCEDURE — 01583ZZ DESTRUCTION OF THORACIC NERVE, PERCUTANEOUS APPROACH: ICD-10-PCS | Performed by: THORACIC SURGERY (CARDIOTHORACIC VASCULAR SURGERY)

## 2025-04-23 PROCEDURE — 07B74ZX EXCISION OF THORAX LYMPHATIC, PERCUTANEOUS ENDOSCOPIC APPROACH, DIAGNOSTIC: ICD-10-PCS | Performed by: THORACIC SURGERY (CARDIOTHORACIC VASCULAR SURGERY)

## 2025-04-23 PROCEDURE — 82962 GLUCOSE BLOOD TEST: CPT | Performed by: THORACIC SURGERY (CARDIOTHORACIC VASCULAR SURGERY)

## 2025-04-23 PROCEDURE — 63600175 PHARM REV CODE 636 W HCPCS: Performed by: ANESTHESIOLOGY

## 2025-04-23 PROCEDURE — 36000711: Performed by: THORACIC SURGERY (CARDIOTHORACIC VASCULAR SURGERY)

## 2025-04-23 PROCEDURE — 21400001 HC TELEMETRY ROOM

## 2025-04-23 PROCEDURE — 25000003 PHARM REV CODE 250: Performed by: NURSE ANESTHETIST, CERTIFIED REGISTERED

## 2025-04-23 PROCEDURE — C1729 CATH, DRAINAGE: HCPCS | Performed by: THORACIC SURGERY (CARDIOTHORACIC VASCULAR SURGERY)

## 2025-04-23 PROCEDURE — 27201423 OPTIME MED/SURG SUP & DEVICES STERILE SUPPLY: Performed by: THORACIC SURGERY (CARDIOTHORACIC VASCULAR SURGERY)

## 2025-04-23 PROCEDURE — 71000016 HC POSTOP RECOV ADDL HR: Performed by: THORACIC SURGERY (CARDIOTHORACIC VASCULAR SURGERY)

## 2025-04-23 PROCEDURE — C2618 PROBE/NEEDLE, CRYO: HCPCS | Performed by: THORACIC SURGERY (CARDIOTHORACIC VASCULAR SURGERY)

## 2025-04-23 PROCEDURE — 0BTC4ZZ RESECTION OF RIGHT UPPER LUNG LOBE, PERCUTANEOUS ENDOSCOPIC APPROACH: ICD-10-PCS | Performed by: THORACIC SURGERY (CARDIOTHORACIC VASCULAR SURGERY)

## 2025-04-23 PROCEDURE — 85025 COMPLETE CBC W/AUTO DIFF WBC: CPT

## 2025-04-23 PROCEDURE — 36000710: Performed by: THORACIC SURGERY (CARDIOTHORACIC VASCULAR SURGERY)

## 2025-04-23 PROCEDURE — 25000003 PHARM REV CODE 250

## 2025-04-23 PROCEDURE — 36620 INSERTION CATHETER ARTERY: CPT | Performed by: NURSE ANESTHETIST, CERTIFIED REGISTERED

## 2025-04-23 PROCEDURE — 71000033 HC RECOVERY, INTIAL HOUR: Performed by: THORACIC SURGERY (CARDIOTHORACIC VASCULAR SURGERY)

## 2025-04-23 PROCEDURE — 37000009 HC ANESTHESIA EA ADD 15 MINS: Performed by: THORACIC SURGERY (CARDIOTHORACIC VASCULAR SURGERY)

## 2025-04-23 RX ORDER — GLUCAGON 1 MG
1 KIT INJECTION
Status: DISCONTINUED | OUTPATIENT
Start: 2025-04-23 | End: 2025-05-06 | Stop reason: HOSPADM

## 2025-04-23 RX ORDER — ACETAMINOPHEN 10 MG/ML
INJECTION, SOLUTION INTRAVENOUS
Status: DISCONTINUED | OUTPATIENT
Start: 2025-04-23 | End: 2025-04-23

## 2025-04-23 RX ORDER — ROCURONIUM BROMIDE 10 MG/ML
INJECTION, SOLUTION INTRAVENOUS
Status: DISCONTINUED | OUTPATIENT
Start: 2025-04-23 | End: 2025-04-23

## 2025-04-23 RX ORDER — FENTANYL CITRATE 50 UG/ML
25 INJECTION, SOLUTION INTRAMUSCULAR; INTRAVENOUS EVERY 5 MIN PRN
Status: DISCONTINUED | OUTPATIENT
Start: 2025-04-23 | End: 2025-04-23 | Stop reason: HOSPADM

## 2025-04-23 RX ORDER — DEXMEDETOMIDINE HYDROCHLORIDE 100 UG/ML
INJECTION, SOLUTION INTRAVENOUS
Status: DISCONTINUED | OUTPATIENT
Start: 2025-04-23 | End: 2025-04-23

## 2025-04-23 RX ORDER — IBUPROFEN 200 MG
24 TABLET ORAL
Status: DISCONTINUED | OUTPATIENT
Start: 2025-04-23 | End: 2025-05-06 | Stop reason: HOSPADM

## 2025-04-23 RX ORDER — ACETAMINOPHEN 325 MG/1
650 TABLET ORAL EVERY 4 HOURS PRN
Status: DISCONTINUED | OUTPATIENT
Start: 2025-04-23 | End: 2025-05-06 | Stop reason: HOSPADM

## 2025-04-23 RX ORDER — HYDROMORPHONE HYDROCHLORIDE 2 MG/ML
0.4 INJECTION, SOLUTION INTRAMUSCULAR; INTRAVENOUS; SUBCUTANEOUS EVERY 5 MIN PRN
Refills: 0 | Status: DISCONTINUED | OUTPATIENT
Start: 2025-04-23 | End: 2025-04-23 | Stop reason: HOSPADM

## 2025-04-23 RX ORDER — SODIUM CHLORIDE 450 MG/100ML
INJECTION, SOLUTION INTRAVENOUS CONTINUOUS
Status: ACTIVE | OUTPATIENT
Start: 2025-04-23 | End: 2025-04-24

## 2025-04-23 RX ORDER — PROPOFOL 10 MG/ML
VIAL (ML) INTRAVENOUS
Status: DISCONTINUED | OUTPATIENT
Start: 2025-04-23 | End: 2025-04-23

## 2025-04-23 RX ORDER — PHENYLEPHRINE HYDROCHLORIDE 10 MG/ML
INJECTION INTRAVENOUS
Status: DISCONTINUED | OUTPATIENT
Start: 2025-04-23 | End: 2025-04-23

## 2025-04-23 RX ORDER — INSULIN ASPART 100 [IU]/ML
0-10 INJECTION, SOLUTION INTRAVENOUS; SUBCUTANEOUS
Status: DISCONTINUED | OUTPATIENT
Start: 2025-04-23 | End: 2025-04-24

## 2025-04-23 RX ORDER — TALC
6 POWDER (GRAM) TOPICAL NIGHTLY PRN
Status: DISCONTINUED | OUTPATIENT
Start: 2025-04-23 | End: 2025-05-06 | Stop reason: HOSPADM

## 2025-04-23 RX ORDER — CEFUROXIME SODIUM 1.5 G/16ML
1.5 INJECTION, POWDER, FOR SOLUTION INTRAVENOUS
Status: COMPLETED | OUTPATIENT
Start: 2025-04-23 | End: 2025-04-23

## 2025-04-23 RX ORDER — HYDRALAZINE HYDROCHLORIDE 20 MG/ML
10 INJECTION INTRAMUSCULAR; INTRAVENOUS ONCE
Status: COMPLETED | OUTPATIENT
Start: 2025-04-23 | End: 2025-04-23

## 2025-04-23 RX ORDER — GLUCAGON 1 MG
1 KIT INJECTION
Status: DISCONTINUED | OUTPATIENT
Start: 2025-04-23 | End: 2025-04-23 | Stop reason: HOSPADM

## 2025-04-23 RX ORDER — LIDOCAINE HYDROCHLORIDE 20 MG/ML
INJECTION, SOLUTION EPIDURAL; INFILTRATION; INTRACAUDAL; PERINEURAL
Status: DISCONTINUED | OUTPATIENT
Start: 2025-04-23 | End: 2025-04-23

## 2025-04-23 RX ORDER — METOCLOPRAMIDE HYDROCHLORIDE 5 MG/ML
5 INJECTION INTRAMUSCULAR; INTRAVENOUS EVERY 6 HOURS PRN
Status: DISCONTINUED | OUTPATIENT
Start: 2025-04-23 | End: 2025-05-06 | Stop reason: HOSPADM

## 2025-04-23 RX ORDER — HYDRALAZINE HYDROCHLORIDE 20 MG/ML
INJECTION INTRAMUSCULAR; INTRAVENOUS
Status: DISPENSED
Start: 2025-04-23 | End: 2025-04-24

## 2025-04-23 RX ORDER — KETOROLAC TROMETHAMINE 30 MG/ML
15 INJECTION, SOLUTION INTRAMUSCULAR; INTRAVENOUS EVERY 6 HOURS
Status: DISCONTINUED | OUTPATIENT
Start: 2025-04-23 | End: 2025-04-25

## 2025-04-23 RX ORDER — GLYCOPYRROLATE 0.2 MG/ML
INJECTION INTRAMUSCULAR; INTRAVENOUS
Status: DISCONTINUED | OUTPATIENT
Start: 2025-04-23 | End: 2025-04-23

## 2025-04-23 RX ORDER — KETOROLAC TROMETHAMINE 30 MG/ML
INJECTION, SOLUTION INTRAMUSCULAR; INTRAVENOUS
Status: DISCONTINUED | OUTPATIENT
Start: 2025-04-23 | End: 2025-04-23

## 2025-04-23 RX ORDER — HYDROCODONE BITARTRATE AND ACETAMINOPHEN 500; 5 MG/1; MG/1
TABLET ORAL
Status: DISCONTINUED | OUTPATIENT
Start: 2025-04-23 | End: 2025-04-23 | Stop reason: HOSPADM

## 2025-04-23 RX ORDER — FAMOTIDINE 20 MG/1
20 TABLET, FILM COATED ORAL 2 TIMES DAILY
Status: DISCONTINUED | OUTPATIENT
Start: 2025-04-23 | End: 2025-05-06 | Stop reason: HOSPADM

## 2025-04-23 RX ORDER — MUPIROCIN 20 MG/G
1 OINTMENT TOPICAL 2 TIMES DAILY
Status: COMPLETED | OUTPATIENT
Start: 2025-04-23 | End: 2025-04-25

## 2025-04-23 RX ORDER — ONDANSETRON 4 MG/1
8 TABLET, ORALLY DISINTEGRATING ORAL EVERY 8 HOURS PRN
Status: DISCONTINUED | OUTPATIENT
Start: 2025-04-23 | End: 2025-05-06 | Stop reason: HOSPADM

## 2025-04-23 RX ORDER — CEFAZOLIN 2 G/1
2 INJECTION, POWDER, FOR SOLUTION INTRAMUSCULAR; INTRAVENOUS
Status: COMPLETED | OUTPATIENT
Start: 2025-04-23 | End: 2025-04-24

## 2025-04-23 RX ORDER — IBUPROFEN 200 MG
16 TABLET ORAL
Status: DISCONTINUED | OUTPATIENT
Start: 2025-04-23 | End: 2025-05-06 | Stop reason: HOSPADM

## 2025-04-23 RX ORDER — BUPIVACAINE 13.3 MG/ML
INJECTION, SUSPENSION, LIPOSOMAL INFILTRATION
Status: DISCONTINUED | OUTPATIENT
Start: 2025-04-23 | End: 2025-04-23 | Stop reason: HOSPADM

## 2025-04-23 RX ORDER — DOCUSATE SODIUM 100 MG/1
100 CAPSULE, LIQUID FILLED ORAL 2 TIMES DAILY
Status: DISCONTINUED | OUTPATIENT
Start: 2025-04-23 | End: 2025-05-06 | Stop reason: HOSPADM

## 2025-04-23 RX ORDER — HYDROCODONE BITARTRATE AND ACETAMINOPHEN 10; 325 MG/1; MG/1
1 TABLET ORAL EVERY 4 HOURS PRN
Status: DISCONTINUED | OUTPATIENT
Start: 2025-04-23 | End: 2025-05-06 | Stop reason: HOSPADM

## 2025-04-23 RX ORDER — FENTANYL CITRATE 50 UG/ML
INJECTION, SOLUTION INTRAMUSCULAR; INTRAVENOUS
Status: DISCONTINUED | OUTPATIENT
Start: 2025-04-23 | End: 2025-04-23

## 2025-04-23 RX ORDER — LOPERAMIDE HYDROCHLORIDE 2 MG/1
4 CAPSULE ORAL ONCE
Status: DISCONTINUED | OUTPATIENT
Start: 2025-04-23 | End: 2025-05-06 | Stop reason: HOSPADM

## 2025-04-23 RX ORDER — DIPHENHYDRAMINE HCL 25 MG
25 CAPSULE ORAL EVERY 6 HOURS PRN
Status: DISCONTINUED | OUTPATIENT
Start: 2025-04-23 | End: 2025-05-06 | Stop reason: HOSPADM

## 2025-04-23 RX ORDER — HYDROCODONE BITARTRATE AND ACETAMINOPHEN 5; 325 MG/1; MG/1
1 TABLET ORAL EVERY 4 HOURS PRN
Status: DISCONTINUED | OUTPATIENT
Start: 2025-04-23 | End: 2025-05-06 | Stop reason: HOSPADM

## 2025-04-23 RX ORDER — MIDAZOLAM HYDROCHLORIDE 1 MG/ML
INJECTION INTRAMUSCULAR; INTRAVENOUS
Status: DISCONTINUED | OUTPATIENT
Start: 2025-04-23 | End: 2025-04-23

## 2025-04-23 RX ADMIN — LIDOCAINE HYDROCHLORIDE 60 MG: 20 INJECTION, SOLUTION EPIDURAL; INFILTRATION; INTRACAUDAL; PERINEURAL at 11:04

## 2025-04-23 RX ADMIN — ACETAMINOPHEN 1000 MG: 10 INJECTION, SOLUTION INTRAVENOUS at 02:04

## 2025-04-23 RX ADMIN — ROCURONIUM BROMIDE 10 MG: 10 SOLUTION INTRAVENOUS at 01:04

## 2025-04-23 RX ADMIN — ROCURONIUM BROMIDE 50 MG: 10 SOLUTION INTRAVENOUS at 11:04

## 2025-04-23 RX ADMIN — HYDRALAZINE HYDROCHLORIDE 10 MG: 20 INJECTION INTRAMUSCULAR; INTRAVENOUS at 03:04

## 2025-04-23 RX ADMIN — ROCURONIUM BROMIDE 20 MG: 10 SOLUTION INTRAVENOUS at 12:04

## 2025-04-23 RX ADMIN — SUGAMMADEX 200 MG: 100 INJECTION, SOLUTION INTRAVENOUS at 02:04

## 2025-04-23 RX ADMIN — PHENYLEPHRINE HYDROCHLORIDE 100 MCG: 10 INJECTION INTRAVENOUS at 12:04

## 2025-04-23 RX ADMIN — HYDROMORPHONE HYDROCHLORIDE 0.4 MG: 2 INJECTION, SOLUTION INTRAMUSCULAR; INTRAVENOUS; SUBCUTANEOUS at 03:04

## 2025-04-23 RX ADMIN — CEFUROXIME 1.5 G: 1.5 INJECTION, POWDER, FOR SOLUTION INTRAVENOUS at 12:04

## 2025-04-23 RX ADMIN — CEFAZOLIN 2 G: 2 INJECTION, POWDER, FOR SOLUTION INTRAMUSCULAR; INTRAVENOUS at 08:04

## 2025-04-23 RX ADMIN — DOCUSATE SODIUM 100 MG: 100 CAPSULE, LIQUID FILLED ORAL at 08:04

## 2025-04-23 RX ADMIN — DEXMEDETOMIDINE 4 MCG: 200 INJECTION, SOLUTION INTRAVENOUS at 02:04

## 2025-04-23 RX ADMIN — MUPIROCIN 1 G: 20 OINTMENT TOPICAL at 08:04

## 2025-04-23 RX ADMIN — SODIUM CHLORIDE, SODIUM GLUCONATE, SODIUM ACETATE, POTASSIUM CHLORIDE AND MAGNESIUM CHLORIDE: 526; 502; 368; 37; 30 INJECTION, SOLUTION INTRAVENOUS at 11:04

## 2025-04-23 RX ADMIN — PROPOFOL 150 MG: 10 INJECTION, EMULSION INTRAVENOUS at 11:04

## 2025-04-23 RX ADMIN — PROPOFOL 50 MG: 10 INJECTION, EMULSION INTRAVENOUS at 12:04

## 2025-04-23 RX ADMIN — KETOROLAC TROMETHAMINE 15 MG: 30 INJECTION, SOLUTION INTRAMUSCULAR; INTRAVENOUS at 02:04

## 2025-04-23 RX ADMIN — KETOROLAC TROMETHAMINE 15 MG: 30 INJECTION, SOLUTION INTRAMUSCULAR; INTRAVENOUS at 04:04

## 2025-04-23 RX ADMIN — INSULIN ASPART 5 UNITS: 100 INJECTION, SOLUTION INTRAVENOUS; SUBCUTANEOUS at 08:04

## 2025-04-23 RX ADMIN — PROPOFOL 20 MG: 10 INJECTION, EMULSION INTRAVENOUS at 01:04

## 2025-04-23 RX ADMIN — FENTANYL CITRATE 100 MCG: 50 INJECTION, SOLUTION INTRAMUSCULAR; INTRAVENOUS at 11:04

## 2025-04-23 RX ADMIN — FENTANYL CITRATE 25 MCG: 50 INJECTION, SOLUTION INTRAMUSCULAR; INTRAVENOUS at 04:04

## 2025-04-23 RX ADMIN — MIDAZOLAM HYDROCHLORIDE 2 MG: 1 INJECTION, SOLUTION INTRAMUSCULAR; INTRAVENOUS at 11:04

## 2025-04-23 RX ADMIN — HYDROCODONE BITARTRATE AND ACETAMINOPHEN 1 TABLET: 10; 325 TABLET ORAL at 07:04

## 2025-04-23 RX ADMIN — GLYCOPYRROLATE 0.2 MG: 0.2 INJECTION INTRAMUSCULAR; INTRAVENOUS at 11:04

## 2025-04-23 RX ADMIN — DIPHENHYDRAMINE HYDROCHLORIDE 25 MG: 25 CAPSULE ORAL at 08:04

## 2025-04-23 RX ADMIN — FAMOTIDINE 20 MG: 20 TABLET, FILM COATED ORAL at 08:04

## 2025-04-23 NOTE — ANESTHESIA PROCEDURE NOTES
Arterial    Diagnosis: Lung mass    Patient location during procedure: holding area  Timeout: 4/23/2025 11:22 AM  Procedure end time: 4/23/2025 11:22 AM    Staffing  Authorizing Provider: Abhijit Redman CRNA  Performing Provider: Abhijit Redman CRNA    Staffing  Performed by: Abhijit Redman CRNA  Authorized by: Abhijit Redman CRNA    Anesthesiologist was present at the time of the procedure.    Preanesthetic Checklist  Completed: patient identified, IV checked, site marked, risks and benefits discussed, surgical consent, monitors and equipment checked, pre-op evaluation, timeout performed and anesthesia consent givenArterial  Skin Prep: chlorhexidine gluconate  Local Infiltration: lidocaine  Orientation: right  Location: radial    Catheter Size: 20 G  Catheter placement by Anatomical landmarks and Ultrasound guidance. Heme positive aspiration all ports.   Vessel Caliber: patent  Needle advanced into vessel with real time Ultrasound guidance.Insertion Attempts: 1  Assessment  Dressing: secured with tape and tegaderm  Patient: Tolerated well

## 2025-04-23 NOTE — TRANSFER OF CARE
"Anesthesia Transfer of Care Note    Patient: Jocelyne Dickinson    Procedure(s) Performed: Procedure(s) (LRB):  VATS (VIDEO-ASSISTED THORACOSCOPIC SURGERY) (Right)  THORACOTOMY (Right)    Patient location: PACU    Anesthesia Type: general    Transport from OR: Transported from OR on room air with adequate spontaneous ventilation    Post pain: adequate analgesia    Post assessment: no apparent anesthetic complications    Post vital signs: stable    Level of consciousness: awake    Nausea/Vomiting: no nausea/vomiting    Complications: none    Transfer of care protocol was followed      Last vitals: Visit Vitals  BP (!) 142/73   Pulse 68   Temp 36.1 °C (97 °F)   Resp (!) 25   Ht 5' 5" (1.651 m)   Wt 68.9 kg (151 lb 14.4 oz)   SpO2 98%   Breastfeeding No   BMI 25.28 kg/m²     "

## 2025-04-23 NOTE — ANESTHESIA PROCEDURE NOTES
Intubation    Date/Time: 4/23/2025 11:36 AM    Performed by: Tanya Ramos CRNA  Authorized by: Michael Montalvo MD    Intubation:     Induction:  Intravenous    Intubated:  Postinduction    Mask Ventilation:  Easy mask    Attempts:  1    Attempted By:  CRNA    Method of Intubation:  Video laryngoscopy and fiberoptic    Blade:  Merino 3    Laryngeal View Grade: Grade I - full view of cords      Difficult Airway Encountered?: No      Complications:  None    Airway Device:  Double lumen tube right    Airway Device Size:  35F    Style/Cuff Inflation:  Cuffed (inflated to minimal occlusive pressure)    Tube secured:  26    Secured at:  The lips    Placement Verified By:  Capnometry and Fiber optic visualization    Complicating Factors:  None    Findings Post-Intubation:  BS equal bilateral and atraumatic/condition of teeth unchanged  Notes:      KAYLAN verification with auscultation and fiberoptic. Pre and post positioning fiberoptic visualization verifying + operative lung isolation.

## 2025-04-23 NOTE — TELEPHONE ENCOUNTER
[facility] Chart Review & Patient Outreach Details For Medication Adherence Project    Reason for Outreach Encounter: 3rd Party payor non-compliance report ([employer], [employer], [employer], etc)  2.  Patient Outreach Method: Reviewed patient chart   3.   Medication in question:    Hypertension Medications              losartan (COZAAR) 100 MG tablet TAKE 1 TABLET BY MOUTH DAILY FOR BLOOD PRESSURE    NIFEdipine (PROCARDIA-XL) 60 MG (OSM) 24 hr tablet TAKE ONE TABLET BY MOUTH DAILY FOR BLOOD PRESSURE                 losartan  last filled  4/19/25 for 30 day supply      4.  Reviewed and or Updates Made To: Patient Chart  5. Outreach Outcomes and/or actions taken: Patient filled medication and is on track to be adherent  Additional Notes:

## 2025-04-23 NOTE — ANESTHESIA PREPROCEDURE EVALUATION
04/23/2025  Jocelyne Dickinson is a 60 y.o., female.     Latest Reference Range & Units 04/15/25 09:25   WBC 4.50 - 11.50 x10(3)/mcL 5.79   RBC 4.20 - 5.40 x10(6)/mcL 4.24   Hemoglobin 12.0 - 16.0 g/dL 12.2   Hematocrit 37.0 - 47.0 % 38.9   MCV 80.0 - 94.0 fL 91.7   MCH 27.0 - 31.0 pg 28.8   MCHC 33.0 - 36.0 g/dL 31.4 (L)   RDW 11.5 - 17.0 % 13.6   Platelet Count 130 - 400 x10(3)/mcL 200   MPV 7.4 - 10.4 fL 10.6 (H)   Neut % % 61.9   LYMPH % % 27.6   Mono % % 9.0   Eos % % 0.7   Basophil % % 0.5   Immature Granulocytes % 0.3   Neut # 2.1 - 9.2 x10(3)/mcL 3.58   Lymph # 0.6 - 4.6 x10(3)/mcL 1.60   Mono # 0.1 - 1.3 x10(3)/mcL 0.52   Eos # 0 - 0.9 x10(3)/mcL 0.04   Baso # <=0.2 x10(3)/mcL 0.03   Immature Grans (Abs) 0.00 - 0.04 x10(3)/mcL 0.02   nRBC % 0.0   PTT 23.2 - 33.7 seconds 28.0   Sodium 136 - 145 mmol/L 141   Potassium 3.5 - 5.1 mmol/L 3.9   Chloride 98 - 107 mmol/L 108 (H)   CO2 22 - 29 mmol/L 27   Anion Gap mEq/L 6.0   BUN 9.8 - 20.1 mg/dL 12.5   Creatinine 0.55 - 1.02 mg/dL 0.89   BUN/CREAT RATIO  14   eGFR mL/min/1.73/m2 >60   Glucose 74 - 100 mg/dL 199 (H)   Calcium 8.4 - 10.2 mg/dL 8.7   ALP 40 - 150 unit/L 98   PROTEIN TOTAL 6.4 - 8.3 gm/dL 6.4   Albumin 3.5 - 5.0 g/dL 3.5   Albumin/Globulin Ratio 1.1 - 2.0 ratio 1.2   BILIRUBIN TOTAL <=1.5 mg/dL 0.2   AST 11 - 45 unit/L 14   ALT 0 - 55 unit/L 10   Globulin, Total 2.4 - 3.5 gm/dL 2.9   (L): Data is abnormally low  (H): Data is abnormally high      Pre-op Assessment    I have reviewed the Patient Summary Reports.     I have reviewed the Nursing Notes. I have reviewed the NPO Status.   I have reviewed the Medications.     Review of Systems  Anesthesia Hx:  No problems with previous Anesthesia                Social:  Smoker       Hematology/Oncology:  Hematology Normal   Oncology Normal                                   EENT/Dental:  EENT/Dental  Normal           Cardiovascular:  Exercise tolerance: good   Hypertension                  Functional Capacity good / => 4 METS                         Pulmonary:  Pneumonia COPD (Emphysema)   Shortness of breath                  Renal/:  Renal/ Normal                 Hepatic/GI:     GERD                Musculoskeletal:  Arthritis               Neurological:        Peripheral Neuropathy                          Endocrine:  Diabetes (A1C 11.6 in 2/2025), poorly controlled, type 2           Dermatological:  Skin Normal    Psych:  Psychiatric History anxiety depression                Physical Exam  General: Well nourished, Cooperative, Alert and Oriented    Airway:  Mallampati: II   Mouth Opening: Normal  TM Distance: Normal  Neck ROM: Normal ROM    Dental:  Edentulous    Chest/Lungs:  Clear to auscultation, Normal Respiratory Rate    Heart:  Rate: Normal  Rhythm: Regular Rhythm        Anesthesia Plan  Type of Anesthesia, risks & benefits discussed:    Anesthesia Type: Gen ETT  Intra-op Monitoring Plan: Standard ASA Monitors and Art Line  Post Op Pain Control Plan: multimodal analgesia  Airway Plan: Video, Post-Induction  Informed Consent: Patient consented to blood products? Yes  ASA Score: 3  Day of Surgery Review of History & Physical: H&P Update referred to the surgeon/provider.    Ready For Surgery From Anesthesia Perspective.     .

## 2025-04-24 LAB
ANION GAP SERPL CALC-SCNC: 5 MEQ/L
BASOPHILS # BLD AUTO: 0.01 X10(3)/MCL
BASOPHILS NFR BLD AUTO: 0.1 %
BUN SERPL-MCNC: 10.7 MG/DL (ref 9.8–20.1)
CALCIUM SERPL-MCNC: 8.1 MG/DL (ref 8.4–10.2)
CHLORIDE SERPL-SCNC: 108 MMOL/L (ref 98–107)
CO2 SERPL-SCNC: 24 MMOL/L (ref 22–29)
CREAT SERPL-MCNC: 0.82 MG/DL (ref 0.55–1.02)
CREAT/UREA NIT SERPL: 13
EOSINOPHIL # BLD AUTO: 0 X10(3)/MCL (ref 0–0.9)
EOSINOPHIL NFR BLD AUTO: 0 %
ERYTHROCYTE [DISTWIDTH] IN BLOOD BY AUTOMATED COUNT: 13.4 % (ref 11.5–17)
GFR SERPLBLD CREATININE-BSD FMLA CKD-EPI: >60 ML/MIN/1.73/M2
GLUCOSE SERPL-MCNC: 240 MG/DL (ref 74–100)
GLUCOSE SERPL-MCNC: 362 MG/DL (ref 70–110)
HCT VFR BLD AUTO: 35.5 % (ref 37–47)
HGB BLD-MCNC: 11.4 G/DL (ref 12–16)
IMM GRANULOCYTES # BLD AUTO: 0.02 X10(3)/MCL (ref 0–0.04)
IMM GRANULOCYTES NFR BLD AUTO: 0.2 %
LYMPHOCYTES # BLD AUTO: 0.79 X10(3)/MCL (ref 0.6–4.6)
LYMPHOCYTES NFR BLD AUTO: 8.8 %
MCH RBC QN AUTO: 28.7 PG (ref 27–31)
MCHC RBC AUTO-ENTMCNC: 32.1 G/DL (ref 33–36)
MCV RBC AUTO: 89.4 FL (ref 80–94)
MONOCYTES # BLD AUTO: 0.73 X10(3)/MCL (ref 0.1–1.3)
MONOCYTES NFR BLD AUTO: 8.2 %
NEUTROPHILS # BLD AUTO: 7.38 X10(3)/MCL (ref 2.1–9.2)
NEUTROPHILS NFR BLD AUTO: 82.7 %
NRBC BLD AUTO-RTO: 0 %
PLATELET # BLD AUTO: 219 X10(3)/MCL (ref 130–400)
PMV BLD AUTO: 11 FL (ref 7.4–10.4)
POCT GLUCOSE: 150 MG/DL (ref 70–110)
POCT GLUCOSE: 217 MG/DL (ref 70–110)
POCT GLUCOSE: 235 MG/DL (ref 70–110)
POCT GLUCOSE: 237 MG/DL (ref 70–110)
POCT GLUCOSE: 240 MG/DL (ref 70–110)
POTASSIUM SERPL-SCNC: 4.8 MMOL/L (ref 3.5–5.1)
RBC # BLD AUTO: 3.97 X10(6)/MCL (ref 4.2–5.4)
SODIUM SERPL-SCNC: 137 MMOL/L (ref 136–145)
WBC # BLD AUTO: 8.93 X10(3)/MCL (ref 4.5–11.5)

## 2025-04-24 PROCEDURE — 85025 COMPLETE CBC W/AUTO DIFF WBC: CPT

## 2025-04-24 PROCEDURE — 97161 PT EVAL LOW COMPLEX 20 MIN: CPT

## 2025-04-24 PROCEDURE — 80048 BASIC METABOLIC PNL TOTAL CA: CPT

## 2025-04-24 PROCEDURE — 63600175 PHARM REV CODE 636 W HCPCS: Performed by: STUDENT IN AN ORGANIZED HEALTH CARE EDUCATION/TRAINING PROGRAM

## 2025-04-24 PROCEDURE — 25000003 PHARM REV CODE 250

## 2025-04-24 PROCEDURE — 21400001 HC TELEMETRY ROOM

## 2025-04-24 PROCEDURE — 36415 COLL VENOUS BLD VENIPUNCTURE: CPT

## 2025-04-24 PROCEDURE — 63600175 PHARM REV CODE 636 W HCPCS

## 2025-04-24 RX ORDER — INSULIN ASPART 100 [IU]/ML
0-15 INJECTION, SOLUTION INTRAVENOUS; SUBCUTANEOUS
Status: DISCONTINUED | OUTPATIENT
Start: 2025-04-24 | End: 2025-05-06 | Stop reason: HOSPADM

## 2025-04-24 RX ADMIN — MUPIROCIN 1 G: 20 OINTMENT TOPICAL at 08:04

## 2025-04-24 RX ADMIN — KETOROLAC TROMETHAMINE 15 MG: 30 INJECTION, SOLUTION INTRAMUSCULAR; INTRAVENOUS at 12:04

## 2025-04-24 RX ADMIN — FAMOTIDINE 20 MG: 20 TABLET, FILM COATED ORAL at 09:04

## 2025-04-24 RX ADMIN — DIPHENHYDRAMINE HYDROCHLORIDE 25 MG: 25 CAPSULE ORAL at 09:04

## 2025-04-24 RX ADMIN — MUPIROCIN 1 G: 20 OINTMENT TOPICAL at 09:04

## 2025-04-24 RX ADMIN — DIPHENHYDRAMINE HYDROCHLORIDE 25 MG: 25 CAPSULE ORAL at 02:04

## 2025-04-24 RX ADMIN — INSULIN ASPART 6 UNITS: 100 INJECTION, SOLUTION INTRAVENOUS; SUBCUTANEOUS at 05:04

## 2025-04-24 RX ADMIN — FAMOTIDINE 20 MG: 20 TABLET, FILM COATED ORAL at 08:04

## 2025-04-24 RX ADMIN — HYDROCODONE BITARTRATE AND ACETAMINOPHEN 1 TABLET: 10; 325 TABLET ORAL at 05:04

## 2025-04-24 RX ADMIN — HYDROCODONE BITARTRATE AND ACETAMINOPHEN 1 TABLET: 10; 325 TABLET ORAL at 08:04

## 2025-04-24 RX ADMIN — INSULIN ASPART 2 UNITS: 100 INJECTION, SOLUTION INTRAVENOUS; SUBCUTANEOUS at 08:04

## 2025-04-24 RX ADMIN — KETOROLAC TROMETHAMINE 15 MG: 30 INJECTION, SOLUTION INTRAMUSCULAR; INTRAVENOUS at 05:04

## 2025-04-24 RX ADMIN — INSULIN ASPART 4 UNITS: 100 INJECTION, SOLUTION INTRAVENOUS; SUBCUTANEOUS at 06:04

## 2025-04-24 RX ADMIN — DOCUSATE SODIUM 100 MG: 100 CAPSULE, LIQUID FILLED ORAL at 08:04

## 2025-04-24 RX ADMIN — DOCUSATE SODIUM 100 MG: 100 CAPSULE, LIQUID FILLED ORAL at 09:04

## 2025-04-24 RX ADMIN — HYDROCODONE BITARTRATE AND ACETAMINOPHEN 1 TABLET: 5; 325 TABLET ORAL at 02:04

## 2025-04-24 RX ADMIN — KETOROLAC TROMETHAMINE 15 MG: 30 INJECTION, SOLUTION INTRAMUSCULAR; INTRAVENOUS at 11:04

## 2025-04-24 RX ADMIN — CEFAZOLIN 2 G: 2 INJECTION, POWDER, FOR SOLUTION INTRAMUSCULAR; INTRAVENOUS at 05:04

## 2025-04-24 NOTE — OP NOTE
[facility] 6th Floor Medical Telemetry  Cardiothoracic Surgery  Operative Note    SUMMARY     Date of Procedure: 4/23/2025     Procedure:  1.  Right video-assisted thoracoscopic surgery with right upper lobectomy  2.  Mediastinal lymph node dissection   3. Cryo analgesia x5 intercostal spaces      Surgeon: AZAM Lees     Assistant:  Frank    Pre-Operative Diagnosis:  1.  Right upper lobe solitary pulmonary nodule  2.  Mediastinal adenopathy  3.  Current smoker    Post-Operative Diagnosis:  Same    Anesthesia: General    Operative Findings (including complications, if any):  Non-small-cell lung cancer of the right upper lobe    Description of Technical Procedures: .  Patient was delivered to the operating room, support lines were placed, general double-lumen endotracheal anesthesia was induced.  The patient was turned to the left lateral decubitus position with the right side up.  She was prepped and draped in usual sterile fashion.  A 5 mm thoracoscope port was placed in the 8th intercostal space and initial inspection of the right hemithorax did not reveal any significant adhesions or lung mass.  A 6 cm posterolateral accessory port was made in the 5th intercostal space and initial palpation of the lung parenchyma revealed patient to have a solitary pulmonary nodule in the right upper lobe which was entirely subpleural.  A mediastinal lymph node dissection was accomplished and lymph node stations 10 and 11 were sampled.  There were no obvious lymph nodes in lymph node stations 2, 4, 8, 9, or 7.    The perihilar pleura was then incised with the Bovie electrocautery and the pulmonary arterial branches of the pulmonary vein branches to the upper lobe were ligated with the vascular stapler.  The bronchus was then clamped and the lower and middle lobes were inflated to ensure patency prior to stapling of the bronchus with a thick tissue stapler.  The right hemithorax was then filled with  sterile water and the lung was inflated to 30 cm of water pressure and the bronchial stump was found to be new metastatic.  Frozen section of the mass returned non-small-cell lung cancer in the lymph nodes were sent for permanent section.  The bronchial margin was also found to be negative.  One hundred twenty-four Bolivian cardio spiral drain was placed in the posterior sulcus and a 28 Bolivian chest tube was placed to the apex.  Cryo analgesia x5 intercostal spaces was performed.  The chest was closed in the usual fashion.  The patient tolerated the procedure well and will be delivered to the recovery room in stable condition.    Estimated Blood Loss (EBL):  100 mL          Specimens:   Specimen (24h ago, onward)       Start     Ordered    04/23/25 1321  Specimen to Pathology  RELEASE UPON ORDERING        References:    Click here for ordering Quick Tip   Question:  Release to patient  Answer:  Immediate    04/23/25 2347                            Condition: Stable    Disposition: PACU - hemodynamically stable.

## 2025-04-24 NOTE — PLAN OF CARE
04/24/25 1143   Discharge Assessment   Assessment Type Discharge Planning Assessment   Confirmed/corrected address, phone number and insurance Yes   Source of Information patient;family   When was your last doctors appointment?   (PCP is Dr. Edi Roldan. Patient reports last PCP appointment was about 1 month ago.)   Reason For Admission VATS   People in Home grandchild(delaney);parent(s)   Do you expect to return to your current living situation? Yes   Do you have help at home or someone to help you manage your care at home? Yes   Who are your caregiver(s) and their phone number(s)? Wkame/Son/[phone]   Current cognitive status: Alert/Oriented   Walking or Climbing Stairs Difficulty no   Dressing/Bathing Difficulty no   Home Accessibility stairs to enter home   Number of Stairs, Main Entrance two   Home Layout Able to live on 1st floor   Equipment Currently Used at Home nebulizer   Readmission within 30 days? No   Do you currently have service(s) that help you manage your care at home? No   Do you take prescription medications? Yes   Do you have prescription coverage? Yes   Do you have any problems affording any of your prescribed medications? No   Who is going to help you get home at discharge? Mother   How do you get to doctors appointments? car, drives self   Are you on dialysis? No   Do you take coumadin? No   Discharge Plan A Home Health   Discharge Plan B Home Health   Discharge Plan discussed with: Patient;Parent(s)     Mother will assist during recovery. Referral sent to [facility] from Dr. Lees's office prior to admit. Patient is in agreement. FOC obtained. Referral sent via Epic.

## 2025-04-24 NOTE — PT/OT/SLP EVAL
Physical Therapy Evaluation and Discharge Note    Patient Name:  Jocelyne Dickinson   MRN:  [id number]    Recommendations:     Discharge therapy intensity: No Therapy Indicated   Discharge Equipment Recommendations: none   Barriers to discharge: Ongoing medical needs    Assessment:     Jocelyne Dickinson is a 60 y.o. female admitted with a medical diagnosis of s/p VATS c mini thoracotomy for RUL lobectomy. At this time, patient is functioning at their prior level of function and does not require further acute PT services.     Recent Surgery: Procedure(s) (LRB):  VATS (VIDEO-ASSISTED THORACOSCOPIC SURGERY) (Right)  THORACOTOMY (Right) 1 Day Post-Op    Plan:     During this hospitalization, patient does not require further acute PT services.  Please re-consult if situation changes.      Subjective     Chief Complaint: none noted  Patient/Family Comments/goals: to walk  Pain/Comfort:  Pain Rating 1: 0/10    Patients cultural, spiritual, Mandaeism conflicts given the current situation:      Living Environment:  Pt lives with her mom in a H with flat entrance.  Prior to admission, patients level of function was I.  Equipment used at home: none.  DME owned (not currently used): none.  Upon discharge, patient will have assistance from mom.    Objective:     Communicated with nsg prior to session.  Patient found up in chair with peripheral IV, pulse ox (continuous), chest tube, telemetry upon PT entry to room.    General Precautions: Standard,      Orthopedic Precautions:N/A   Braces: N/A  Respiratory Status: Room air 90% with mobility    Exams:  RLE Strength: WFL  LLE Strength: WFL    Functional Mobility:  Transfers:     Sit to Stand:  modified independence with no AD  Gait: Pt ambulated 450' without AD SBA. No LOB or safety concerns. Required assistance for CT management.   Balance: good dynamic standing balance    AM-PAC 6 CLICK MOBILITY  Total Score:24       Treatment and Education:    Patient provided with verbal  education education regarding PT role/goals/POC, fall prevention, safety awareness, and discharge/DME recommendations.  Understanding was verbalized.     Patient left up in chair with all lines intact, call button in reach, and mom present.    GOALS:   Multidisciplinary Problems       Physical Therapy Goals       Not on file                    History:     Past Medical History:   Diagnosis Date    Abdominal pain     resolved    Anxiety disorder, unspecified     Carotid artery stenosis     Cellulitis of scalp     resolved    COPD (chronic obstructive pulmonary disease)     Diabetes mellitus     Digestive disorder     acid reflux    Disorder of pancreas     Disorder of pancreatic duct     Emphysema, unspecified     Fatigue     Gastrointestinal tract imaging abnormality     HTN (hypertension)     Mixed hyperlipidemia     Neuropathy     Nodule of apex of right lung     Osteoarthritis     Pancreatitis     Personal history of nicotine dependence     Pneumonia, unspecified organism     Shortness of breath     Syncope     Weight loss        Past Surgical History:   Procedure Laterality Date    BRONCHOSCOPY  03/19/2025    [facility]-    CATARACT EXTRACTION      COLONOSCOPY  07/02/2021    Dr. Nila Sharma    EGD, WITH CLOSED BIOPSY  03/12/2024    Procedure: EGD, WITH CLOSED BIOPSY;  Surgeon: Diego Atkinson MD;  Location: [facility];  Service: Gastroenterology;;  CBD 5 mm, HOP Cyst 4.5mm x 5 mm, Chronic Pancreatitis, PD Head 2.2 mm, PD Body 4.2 mm,    EYE SURGERY  2022    Cataract surgery on both eyes    REPAIR OF CAROTID ARTERY      THORACOTOMY Right 4/23/2025    Procedure: THORACOTOMY;  Surgeon: Lewis Lees MD;  Location: [facility];  Service: Cardiovascular;  Laterality: Right;    TONSILLECTOMY  1969    I was 4 years old when tonsils were removed    ULTRASOUND, ENDOSCOPIC, WITH FINE NEEDLE ASPIRATION N/A 03/12/2024    Procedure: UPPER EUS W/ POSS FNA;  Surgeon: Diego Atkinson MD;  [facility];   Service: Gastroenterology;  Laterality: N/A;  MRI abd- Limited exam due to image quality. Overall pancreatic atrophy. MAin PD dilation to 9mm in the tail with dialtied side branches. Rerlative abrupt caliber change of main PD at body and tail junction.    VIDEO-ASSISTED THORACOSCOPIC SURGERY (VATS) Right 4/23/2025    Procedure: VATS (VIDEO-ASSISTED THORACOSCOPIC SURGERY);  Surgeon: Lewis Lees MD;  Location: [facility];  Service: Cardiovascular;  Laterality: Right;  RIGHT VATS, RIGHT UPPER LOBECTOMY    WRIST SURGERY Bilateral        Time Tracking:     PT Received On: 04/24/25  PT Start Time: 0858     PT Stop Time: 0906  PT Total Time (min): 8 min     Billable Minutes: Evaluation 8      04/24/2025

## 2025-04-24 NOTE — PROGRESS NOTES
Jocelyne Dickinson is a 60 y.o. female patient.   1. Nodule of apex of right lung      Past Medical History:   Diagnosis Date    Abdominal pain     resolved    Anxiety disorder, unspecified     Carotid artery stenosis     Cellulitis of scalp     resolved    COPD (chronic obstructive pulmonary disease)     Diabetes mellitus     Digestive disorder     acid reflux    Disorder of pancreas     Disorder of pancreatic duct     Emphysema, unspecified     Fatigue     Gastrointestinal tract imaging abnormality     HTN (hypertension)     Mixed hyperlipidemia     Neuropathy     Nodule of apex of right lung     Osteoarthritis     Pancreatitis     Personal history of nicotine dependence     Pneumonia, unspecified organism     Shortness of breath     Syncope     Weight loss      No past surgical history pertinent negatives on file.  Scheduled Meds:   docusate sodium  100 mg Oral BID    famotidine  20 mg Oral BID    ketorolac  15 mg Intravenous Q6H    loperamide  4 mg Oral Once    mupirocin  1 g Nasal BID     Continuous Infusions:   0.45% NaCl   Intravenous Continuous         PRN Meds:  Current Facility-Administered Medications:     acetaminophen, 650 mg, Oral, Q4H PRN    dextrose 50%, 12.5 g, Intravenous, PRN    dextrose 50%, 25 g, Intravenous, PRN    diphenhydrAMINE, 25 mg, Oral, Q6H PRN    glucagon (human recombinant), 1 mg, Intramuscular, PRN    glucose, 16 g, Oral, PRN    glucose, 24 g, Oral, PRN    HYDROcodone-acetaminophen, 1 tablet, Oral, Q4H PRN    HYDROcodone-acetaminophen, 1 tablet, Oral, Q4H PRN    insulin aspart U-100, 0-10 Units, Subcutaneous, QID (AC + HS) PRN    melatonin, 6 mg, Oral, Nightly PRN    metoclopramide, 5 mg, Intravenous, Q6H PRN    ondansetron, 8 mg, Oral, Q8H PRN    Review of patient's allergies indicates:   Allergen Reactions    Aller ext-american cockroach Itching and Other (See Comments)    Allerg ext-tree poll-red maple Itching and Other (See Comments)    Cat hair standardized allergenic extract  "Itching and Other (See Comments)    Dog hair standardized allergenic extract Itching and Other (See Comments)    Grass pollen-ruslan, standard Itching and Other (See Comments)    Horse dander Itching and Other (See Comments)    Tree pollen-box elder Itching and Other (See Comments)    Tree pollen-pecan Itching and Other (See Comments)    Trulicity [dulaglutide] Other (See Comments)     Patient had pancreatitis       There are no hospital problems to display for this patient.    Blood pressure 131/70, pulse 83, temperature 98.1 °F (36.7 °C), temperature source Oral, resp. rate 18, height 5' 5" (1.651 m), weight 68.5 kg (151 lb), SpO2 (!) 92%, not currently breastfeeding.    Subjective:    NAEO   AF  VSS   Pain controlled   /30      Objective:   Vitals:    04/24/25 0508   BP:    Pulse:    Resp: 18   Temp:      Gen: NAD  HEENT: anicteric sclera, MMM, CHANTELL   Chest: RR, unlabored respirations, R chest incisions CDI. CT x2 w small air leak   Abd: s/nt/nd  Ext: well perfused, no edema    CXR: stable small R PTX       Assesment/Plan:    60F s/p VATS, mini thoracotomy for RUL lobectomy    -Continue CT to suction  -Persistently elevated blood glucoses, increase sliding scale   -Daily CXR  -OOB, IS         Stephanie Johnson MD  [employer] General Surgery PGY4        "

## 2025-04-24 NOTE — ANESTHESIA POSTPROCEDURE EVALUATION
Anesthesia Post Evaluation    Patient: Jocelyne Dickinson    Procedure(s) Performed: Procedure(s) (LRB):  VATS (VIDEO-ASSISTED THORACOSCOPIC SURGERY) (Right)  THORACOTOMY (Right)    Final Anesthesia Type: general      Patient location during evaluation: PACU  Patient participation: Yes- Able to Participate  Level of consciousness: awake and alert  Post-procedure vital signs: reviewed and stable  Pain management: adequate  Airway patency: patent    PONV status at discharge: No PONV  Anesthetic complications: no      Cardiovascular status: hemodynamically stable  Respiratory status: spontaneous ventilation and room air  Hydration status: euvolemic  Follow-up not needed.              Vitals Value Taken Time   /65 04/23/25 16:40   Temp 36.4 °C (97.5 °F) 04/23/25 16:40   Pulse 72 04/23/25 16:40   Resp 16 04/23/25 16:40   SpO2 96 % 04/23/25 16:40         Event Time   Out of Recovery 16:40:00         Pain/Suhail Score: Pain Rating Prior to Med Admin: 6 (4/24/2025  5:08 AM)  Pain Rating Post Med Admin: 0 (4/24/2025  5:38 AM)  Suhail Score: 10 (4/23/2025  4:40 PM)

## 2025-04-24 NOTE — PLAN OF CARE
Problem: Adult Inpatient Plan of Care  Goal: Plan of Care Review  4/23/2025 2145 by Jennifer Corona RN  Outcome: Progressing  4/23/2025 2144 by Jennifer Corona RN  Outcome: Progressing  Goal: Patient-Specific Goal (Individualized)  4/23/2025 2145 by Jennifer Corona RN  Outcome: Progressing  4/23/2025 2144 by Jennifer Corona RN  Outcome: Progressing  Goal: Absence of Hospital-Acquired Illness or Injury  4/23/2025 2145 by Jennifer Corona RN  Outcome: Progressing  4/23/2025 2144 by Jennifer Corona RN  Outcome: Progressing  Goal: Optimal Comfort and Wellbeing  4/23/2025 2145 by Jennifer Corona RN  Outcome: Progressing  4/23/2025 2144 by Jennifer Corona RN  Outcome: Progressing  Goal: Readiness for Transition of Care  4/23/2025 2145 by Jennifer Corona RN  Outcome: Progressing  4/23/2025 2144 by Jennifer Corona RN  Outcome: Progressing     Problem: Infection  Goal: Absence of Infection Signs and Symptoms  4/23/2025 2145 by Jennifer Corona RN  Outcome: Progressing  4/23/2025 2144 by Jennifer Corona RN  Outcome: Progressing     Problem: Wound  Goal: Optimal Coping  4/23/2025 2145 by Jennifer Corona RN  Outcome: Progressing  4/23/2025 2144 by Jennifer Corona RN  Outcome: Progressing  Goal: Optimal Functional Ability  4/23/2025 2145 by Jennifer Corona RN  Outcome: Progressing  4/23/2025 2144 by Jennifer Corona RN  Outcome: Progressing  Goal: Absence of Infection Signs and Symptoms  4/23/2025 2145 by Jennifer Corona RN  Outcome: Progressing  4/23/2025 2144 by Jennifer Corona RN  Outcome: Progressing  Goal: Improved Oral Intake  4/23/2025 2145 by Jennifer Corona RN  Outcome: Progressing  4/23/2025 2144 by Jennifer Corona RN  Outcome: Progressing  Goal: Optimal Pain Control and Function  4/23/2025 2145 by Jennifer Corona RN  Outcome: Progressing  4/23/2025 2144 by Jennifer Corona RN  Outcome: Progressing  Goal: Skin Health and Integrity  4/23/2025 2145 by Jennifer Corona,  RN  Outcome: Progressing  4/23/2025 2144 by Jennifer Corona RN  Outcome: Progressing  Goal: Optimal Wound Healing  4/23/2025 2145 by Jennifer Corona RN  Outcome: Progressing  4/23/2025 2144 by Jennifer Corona RN  Outcome: Progressing     Problem: Fall Injury Risk  Goal: Absence of Fall and Fall-Related Injury  4/23/2025 2145 by Jennifer Corona RN  Outcome: Progressing  4/23/2025 2144 by Jennifer Corona RN  Outcome: Progressing     Problem: Pain Acute  Goal: Optimal Pain Control and Function  Outcome: Progressing

## 2025-04-25 LAB
ABO + RH BLD: NORMAL
ANION GAP SERPL CALC-SCNC: 7 MEQ/L
BLD PROD TYP BPU: NORMAL
BLOOD UNIT EXPIRATION DATE: NORMAL
BLOOD UNIT TYPE CODE: 6200
BUN SERPL-MCNC: 10.8 MG/DL (ref 9.8–20.1)
CALCIUM SERPL-MCNC: 7.9 MG/DL (ref 8.4–10.2)
CHLORIDE SERPL-SCNC: 105 MMOL/L (ref 98–107)
CO2 SERPL-SCNC: 26 MMOL/L (ref 22–29)
CREAT SERPL-MCNC: 0.91 MG/DL (ref 0.55–1.02)
CREAT/UREA NIT SERPL: 12
CROSSMATCH INTERPRETATION: NORMAL
DISPENSE STATUS: NORMAL
GFR SERPLBLD CREATININE-BSD FMLA CKD-EPI: >60 ML/MIN/1.73/M2
GLUCOSE SERPL-MCNC: 201 MG/DL (ref 74–100)
POCT GLUCOSE: 178 MG/DL (ref 70–110)
POCT GLUCOSE: 268 MG/DL (ref 70–110)
POTASSIUM SERPL-SCNC: 4.3 MMOL/L (ref 3.5–5.1)
SODIUM SERPL-SCNC: 138 MMOL/L (ref 136–145)
UNIT NUMBER: NORMAL

## 2025-04-25 PROCEDURE — 36415 COLL VENOUS BLD VENIPUNCTURE: CPT

## 2025-04-25 PROCEDURE — 80048 BASIC METABOLIC PNL TOTAL CA: CPT

## 2025-04-25 PROCEDURE — 63600175 PHARM REV CODE 636 W HCPCS: Performed by: STUDENT IN AN ORGANIZED HEALTH CARE EDUCATION/TRAINING PROGRAM

## 2025-04-25 PROCEDURE — 21400001 HC TELEMETRY ROOM

## 2025-04-25 PROCEDURE — 25000003 PHARM REV CODE 250

## 2025-04-25 RX ORDER — IPRATROPIUM BROMIDE AND ALBUTEROL SULFATE 2.5; .5 MG/3ML; MG/3ML
3 SOLUTION RESPIRATORY (INHALATION) EVERY 6 HOURS PRN
Status: DISCONTINUED | OUTPATIENT
Start: 2025-04-25 | End: 2025-05-01

## 2025-04-25 RX ADMIN — HYDROCODONE BITARTRATE AND ACETAMINOPHEN 1 TABLET: 10; 325 TABLET ORAL at 10:04

## 2025-04-25 RX ADMIN — HYDROCODONE BITARTRATE AND ACETAMINOPHEN 1 TABLET: 10; 325 TABLET ORAL at 05:04

## 2025-04-25 RX ADMIN — HYDROCODONE BITARTRATE AND ACETAMINOPHEN 1 TABLET: 10; 325 TABLET ORAL at 01:04

## 2025-04-25 RX ADMIN — INSULIN ASPART 6 UNITS: 100 INJECTION, SOLUTION INTRAVENOUS; SUBCUTANEOUS at 08:04

## 2025-04-25 RX ADMIN — HYDROCODONE BITARTRATE AND ACETAMINOPHEN 1 TABLET: 10; 325 TABLET ORAL at 09:04

## 2025-04-25 RX ADMIN — MUPIROCIN 1 G: 20 OINTMENT TOPICAL at 09:04

## 2025-04-25 RX ADMIN — DIPHENHYDRAMINE HYDROCHLORIDE 25 MG: 25 CAPSULE ORAL at 08:04

## 2025-04-25 RX ADMIN — FAMOTIDINE 20 MG: 20 TABLET, FILM COATED ORAL at 09:04

## 2025-04-25 RX ADMIN — Medication 6 MG: at 08:04

## 2025-04-25 RX ADMIN — DOCUSATE SODIUM 100 MG: 100 CAPSULE, LIQUID FILLED ORAL at 09:04

## 2025-04-25 RX ADMIN — INSULIN ASPART 6 UNITS: 100 INJECTION, SOLUTION INTRAVENOUS; SUBCUTANEOUS at 10:04

## 2025-04-25 RX ADMIN — HYDROCODONE BITARTRATE AND ACETAMINOPHEN 1 TABLET: 10; 325 TABLET ORAL at 12:04

## 2025-04-25 RX ADMIN — FAMOTIDINE 20 MG: 20 TABLET, FILM COATED ORAL at 08:04

## 2025-04-25 RX ADMIN — DOCUSATE SODIUM 100 MG: 100 CAPSULE, LIQUID FILLED ORAL at 08:04

## 2025-04-25 NOTE — PROGRESS NOTES
Jocelyne Dickinson is a 60 y.o. female patient.   1. Nodule of apex of right lung      Past Medical History:   Diagnosis Date    Abdominal pain     resolved    Anxiety disorder, unspecified     Carotid artery stenosis     Cellulitis of scalp     resolved    COPD (chronic obstructive pulmonary disease)     Diabetes mellitus     Digestive disorder     acid reflux    Disorder of pancreas     Disorder of pancreatic duct     Emphysema, unspecified     Fatigue     Gastrointestinal tract imaging abnormality     HTN (hypertension)     Mixed hyperlipidemia     Neuropathy     Nodule of apex of right lung     Osteoarthritis     Pancreatitis     Personal history of nicotine dependence     Pneumonia, unspecified organism     Shortness of breath     Syncope     Weight loss      No past surgical history pertinent negatives on file.  Scheduled Meds:   docusate sodium  100 mg Oral BID    famotidine  20 mg Oral BID    ketorolac  15 mg Intravenous Q6H    loperamide  4 mg Oral Once    mupirocin  1 g Nasal BID     Continuous Infusions:      PRN Meds:  Current Facility-Administered Medications:     acetaminophen, 650 mg, Oral, Q4H PRN    dextrose 50%, 12.5 g, Intravenous, PRN    dextrose 50%, 25 g, Intravenous, PRN    diphenhydrAMINE, 25 mg, Oral, Q6H PRN    glucagon (human recombinant), 1 mg, Intramuscular, PRN    glucose, 16 g, Oral, PRN    glucose, 24 g, Oral, PRN    HYDROcodone-acetaminophen, 1 tablet, Oral, Q4H PRN    HYDROcodone-acetaminophen, 1 tablet, Oral, Q4H PRN    insulin aspart U-100, 0-15 Units, Subcutaneous, QID (AC + HS) PRN    melatonin, 6 mg, Oral, Nightly PRN    metoclopramide, 5 mg, Intravenous, Q6H PRN    ondansetron, 8 mg, Oral, Q8H PRN    Review of patient's allergies indicates:   Allergen Reactions    Aller ext-american cockroach Itching and Other (See Comments)    Allerg ext-tree poll-red maple Itching and Other (See Comments)    Cat hair standardized allergenic extract Itching and Other (See Comments)    Dog hair  "standardized allergenic extract Itching and Other (See Comments)    Grass pollen-ruslan, standard Itching and Other (See Comments)    Horse dander Itching and Other (See Comments)    Tree pollen-box elder Itching and Other (See Comments)    Tree pollen-pecan Itching and Other (See Comments)    Trulicity [dulaglutide] Other (See Comments)     Patient had pancreatitis       There are no hospital problems to display for this patient.    Blood pressure 116/61, pulse 86, temperature 97.4 °F (36.3 °C), temperature source Oral, resp. rate 16, height 5' 5" (1.651 m), weight 68.5 kg (151 lb), SpO2 (!) 93%, not currently breastfeeding.    Subjective:    NAEO   AF  VSS   /105  Itching w toradol - will dc      Objective:   Vitals:    04/25/25 0518   BP: 116/61   Pulse: 86   Resp: 16   Temp: 97.4 °F (36.3 °C)     Gen: NAD  HEENT: anicteric sclera, MMM, CHANTELL   Chest: RR, unlabored respirations, R chest incisions CDI. CT x2 no air leak   Abd: s/nt/nd  Ext: well perfused, no edema    CXR: stable small R PTX       Assesment/Plan:    60F s/p VATS, mini thoracotomy for RUL lobectomy 4/23    -Continue CT to suction  -Daily CXR  -OOB, IS         Stephanie Johnson MD  [employer] General Surgery PGY4        "

## 2025-04-26 LAB
POCT GLUCOSE: 198 MG/DL (ref 70–110)
POCT GLUCOSE: 226 MG/DL (ref 70–110)
POCT GLUCOSE: 247 MG/DL (ref 70–110)
POCT GLUCOSE: 250 MG/DL (ref 70–110)

## 2025-04-26 PROCEDURE — 21400001 HC TELEMETRY ROOM

## 2025-04-26 PROCEDURE — 63600175 PHARM REV CODE 636 W HCPCS: Performed by: STUDENT IN AN ORGANIZED HEALTH CARE EDUCATION/TRAINING PROGRAM

## 2025-04-26 PROCEDURE — 25000003 PHARM REV CODE 250

## 2025-04-26 RX ORDER — POLYETHYLENE GLYCOL 3350 17 G/17G
17 POWDER, FOR SOLUTION ORAL 2 TIMES DAILY PRN
Status: DISCONTINUED | OUTPATIENT
Start: 2025-04-26 | End: 2025-05-06 | Stop reason: HOSPADM

## 2025-04-26 RX ADMIN — DIPHENHYDRAMINE HYDROCHLORIDE 25 MG: 25 CAPSULE ORAL at 08:04

## 2025-04-26 RX ADMIN — DOCUSATE SODIUM 100 MG: 100 CAPSULE, LIQUID FILLED ORAL at 08:04

## 2025-04-26 RX ADMIN — HYDROCODONE BITARTRATE AND ACETAMINOPHEN 1 TABLET: 10; 325 TABLET ORAL at 04:04

## 2025-04-26 RX ADMIN — DOCUSATE SODIUM 100 MG: 100 CAPSULE, LIQUID FILLED ORAL at 07:04

## 2025-04-26 RX ADMIN — INSULIN ASPART 6 UNITS: 100 INJECTION, SOLUTION INTRAVENOUS; SUBCUTANEOUS at 10:04

## 2025-04-26 RX ADMIN — INSULIN ASPART 4 UNITS: 100 INJECTION, SOLUTION INTRAVENOUS; SUBCUTANEOUS at 09:04

## 2025-04-26 RX ADMIN — HYDROCODONE BITARTRATE AND ACETAMINOPHEN 1 TABLET: 10; 325 TABLET ORAL at 02:04

## 2025-04-26 RX ADMIN — FAMOTIDINE 20 MG: 20 TABLET, FILM COATED ORAL at 08:04

## 2025-04-26 RX ADMIN — HYDROCODONE BITARTRATE AND ACETAMINOPHEN 1 TABLET: 10; 325 TABLET ORAL at 08:04

## 2025-04-26 RX ADMIN — HYDROCODONE BITARTRATE AND ACETAMINOPHEN 1 TABLET: 10; 325 TABLET ORAL at 07:04

## 2025-04-26 RX ADMIN — Medication 6 MG: at 08:04

## 2025-04-26 RX ADMIN — INSULIN ASPART 3 UNITS: 100 INJECTION, SOLUTION INTRAVENOUS; SUBCUTANEOUS at 05:04

## 2025-04-26 RX ADMIN — FAMOTIDINE 20 MG: 20 TABLET, FILM COATED ORAL at 07:04

## 2025-04-26 RX ADMIN — HYDROCODONE BITARTRATE AND ACETAMINOPHEN 1 TABLET: 10; 325 TABLET ORAL at 12:04

## 2025-04-26 RX ADMIN — POLYETHYLENE GLYCOL 3350 17 G: 17 POWDER, FOR SOLUTION ORAL at 09:04

## 2025-04-26 NOTE — PROGRESS NOTES
Jocelyne Dickinson is a 60 y.o. female patient.   1. Nodule of apex of right lung      Past Medical History:   Diagnosis Date    Abdominal pain     resolved    Anxiety disorder, unspecified     Carotid artery stenosis     Cellulitis of scalp     resolved    COPD (chronic obstructive pulmonary disease)     Diabetes mellitus     Digestive disorder     acid reflux    Disorder of pancreas     Disorder of pancreatic duct     Emphysema, unspecified     Fatigue     Gastrointestinal tract imaging abnormality     HTN (hypertension)     Mixed hyperlipidemia     Neuropathy     Nodule of apex of right lung     Osteoarthritis     Pancreatitis     Personal history of nicotine dependence     Pneumonia, unspecified organism     Shortness of breath     Syncope     Weight loss      No past surgical history pertinent negatives on file.  Scheduled Meds:   docusate sodium  100 mg Oral BID    famotidine  20 mg Oral BID    loperamide  4 mg Oral Once     Continuous Infusions:  PRN Meds:  Current Facility-Administered Medications:     acetaminophen, 650 mg, Oral, Q4H PRN    albuterol-ipratropium, 3 mL, Nebulization, Q6H PRN    dextrose 50%, 12.5 g, Intravenous, PRN    dextrose 50%, 25 g, Intravenous, PRN    diphenhydrAMINE, 25 mg, Oral, Q6H PRN    glucagon (human recombinant), 1 mg, Intramuscular, PRN    glucose, 16 g, Oral, PRN    glucose, 24 g, Oral, PRN    HYDROcodone-acetaminophen, 1 tablet, Oral, Q4H PRN    HYDROcodone-acetaminophen, 1 tablet, Oral, Q4H PRN    insulin aspart U-100, 0-15 Units, Subcutaneous, QID (AC + HS) PRN    melatonin, 6 mg, Oral, Nightly PRN    metoclopramide, 5 mg, Intravenous, Q6H PRN    ondansetron, 8 mg, Oral, Q8H PRN    Review of patient's allergies indicates:   Allergen Reactions    Aller ext-american cockroach Itching and Other (See Comments)    Allerg ext-tree poll-red maple Itching and Other (See Comments)    Cat hair standardized allergenic extract Itching and Other (See Comments)    Dog hair standardized  "allergenic extract Itching and Other (See Comments)    Grass pollen-ruslan, standard Itching and Other (See Comments)    Horse dander Itching and Other (See Comments)    Tree pollen-box elder Itching and Other (See Comments)    Tree pollen-pecan Itching and Other (See Comments)    Trulicity [dulaglutide] Other (See Comments)     Patient had pancreatitis       There are no hospital problems to display for this patient.    Blood pressure (!) 141/66, pulse 79, temperature 97.9 °F (36.6 °C), temperature source Oral, resp. rate 18, height 5' 5" (1.651 m), weight 69.6 kg (153 lb 7 oz), SpO2 (!) 94%, not currently breastfeeding.    Subjective:  POD3 s/p RUL. NAEON. VSS. Room air. CT minimal output to -20 suction, small airleak      Objective:  Up in chair  Aaox3  Rrr  Ctab  Abd soft      Assesment/Plan:    CT to WS  AM CXR          NINA Celis  4/26/2025    "

## 2025-04-27 LAB
POCT GLUCOSE: 228 MG/DL (ref 70–110)
POCT GLUCOSE: 229 MG/DL (ref 70–110)
POCT GLUCOSE: 245 MG/DL (ref 70–110)

## 2025-04-27 PROCEDURE — 21400001 HC TELEMETRY ROOM

## 2025-04-27 PROCEDURE — 25000003 PHARM REV CODE 250

## 2025-04-27 PROCEDURE — 63600175 PHARM REV CODE 636 W HCPCS: Performed by: STUDENT IN AN ORGANIZED HEALTH CARE EDUCATION/TRAINING PROGRAM

## 2025-04-27 PROCEDURE — 94799 UNLISTED PULMONARY SVC/PX: CPT

## 2025-04-27 RX ADMIN — POLYETHYLENE GLYCOL 3350 17 G: 17 POWDER, FOR SOLUTION ORAL at 08:04

## 2025-04-27 RX ADMIN — INSULIN ASPART 6 UNITS: 100 INJECTION, SOLUTION INTRAVENOUS; SUBCUTANEOUS at 04:04

## 2025-04-27 RX ADMIN — DIPHENHYDRAMINE HYDROCHLORIDE 25 MG: 25 CAPSULE ORAL at 08:04

## 2025-04-27 RX ADMIN — HYDROCODONE BITARTRATE AND ACETAMINOPHEN 1 TABLET: 10; 325 TABLET ORAL at 08:04

## 2025-04-27 RX ADMIN — DOCUSATE SODIUM 100 MG: 100 CAPSULE, LIQUID FILLED ORAL at 08:04

## 2025-04-27 RX ADMIN — Medication 6 MG: at 08:04

## 2025-04-27 RX ADMIN — HYDROCODONE BITARTRATE AND ACETAMINOPHEN 1 TABLET: 10; 325 TABLET ORAL at 02:04

## 2025-04-27 RX ADMIN — HYDROCODONE BITARTRATE AND ACETAMINOPHEN 1 TABLET: 10; 325 TABLET ORAL at 03:04

## 2025-04-27 RX ADMIN — FAMOTIDINE 20 MG: 20 TABLET, FILM COATED ORAL at 08:04

## 2025-04-27 RX ADMIN — INSULIN ASPART 6 UNITS: 100 INJECTION, SOLUTION INTRAVENOUS; SUBCUTANEOUS at 05:04

## 2025-04-27 NOTE — PROGRESS NOTES
[facility] - 6th Floor Medical Telemetry  Cardiothoracic Surgery  Progress Note    Patient Name: Jocelyne Dickinson  MRN: [id number]  Admission Date: 4/23/2025  Hospital Length of Stay: 4 days  Code Status: No Order   Attending Physician: Lewis Lees MD   Referring Provider: Lewis Lees MD  Principal Problem:<principal problem not specified>            Subjective:     Post-Op Info:  Procedure(s) (LRB):  VATS (VIDEO-ASSISTED THORACOSCOPIC SURGERY) (Right)  THORACOTOMY (Right)   4 Days Post-Op     Interval History:  Patient is doing well postoperative day 4 from right upper lobectomy.  Chest tube output acceptable.  Continues with small air leak.  Will discontinue Ezequiel drain and continue chest tube suction at 20 cm of water pressure.  Increase incentive spirometry and ambulation    Review of Systems   Constitutional: Negative. Negative for chills, diaphoresis, fever and night sweats.   HENT:  Negative for hoarse voice, sore throat and stridor.    Eyes: Negative.  Negative for blurred vision and double vision.   Cardiovascular:  Negative for chest pain, claudication, cyanosis, dyspnea on exertion, irregular heartbeat, leg swelling, orthopnea, palpitations, paroxysmal nocturnal dyspnea and syncope.   Respiratory:  Negative for cough, hemoptysis, shortness of breath, sputum production and wheezing.    Endocrine: Negative for polydipsia and polyuria.   Hematologic/Lymphatic: Negative for adenopathy and bleeding problem.   Gastrointestinal:  Negative for abdominal pain, diarrhea, heartburn, hematemesis, hematochezia, nausea and vomiting.   Neurological:  Negative for brief paralysis, disturbances in coordination, dizziness, focal weakness, headaches, numbness and paresthesias.     Medications:  Continuous Infusions:  Scheduled Meds:   docusate sodium  100 mg Oral BID    famotidine  20 mg Oral BID    loperamide  4 mg Oral Once     PRN Meds:  Current Facility-Administered Medications:     acetaminophen, 650  mg, Oral, Q4H PRN    albuterol-ipratropium, 3 mL, Nebulization, Q6H PRN    dextrose 50%, 12.5 g, Intravenous, PRN    dextrose 50%, 25 g, Intravenous, PRN    diphenhydrAMINE, 25 mg, Oral, Q6H PRN    glucagon (human recombinant), 1 mg, Intramuscular, PRN    glucose, 16 g, Oral, PRN    glucose, 24 g, Oral, PRN    HYDROcodone-acetaminophen, 1 tablet, Oral, Q4H PRN    HYDROcodone-acetaminophen, 1 tablet, Oral, Q4H PRN    insulin aspart U-100, 0-15 Units, Subcutaneous, QID (AC + HS) PRN    melatonin, 6 mg, Oral, Nightly PRN    metoclopramide, 5 mg, Intravenous, Q6H PRN    ondansetron, 8 mg, Oral, Q8H PRN    polyethylene glycol, 17 g, Oral, BID PRN     Objective:     Vital Signs (Most Recent):  Temp: 97.4 °F (36.3 °C) (04/27/25 0740)  Pulse: 80 (04/27/25 0740)  Resp: 16 (04/27/25 0339)  BP: 131/80 (04/27/25 0740)  SpO2: 99 % (04/27/25 0740) Vital Signs (24h Range):  Temp:  [97.4 °F (36.3 °C)-98.2 °F (36.8 °C)] 97.4 °F (36.3 °C)  Pulse:  [75-96] 80  Resp:  [16-20] 16  SpO2:  [93 %-99 %] 99 %  BP: (127-177)/(60-80) 131/80     Weight: 68.8 kg (151 lb 10.8 oz)  Body mass index is 25.24 kg/m².    SpO2: 99 %       Intake/Output - Last 3 Shifts         04/25 0700 04/26 0659 04/26 0700 04/27 0659 04/27 0700 04/28 0659    P.O. 1360 630     Total Intake(mL/kg) 1360 (19.5) 630 (9.2)     Urine (mL/kg/hr) 0 (0) 0 (0)     Stool 0      Chest Tube 280 300     Total Output 280 300     Net +1080 +330            Urine Occurrence 6 x 5 x     Stool Occurrence 0 x              Lines/Drains/Airways       Drain  Duration                  Chest Tube 04/23/25 Tube - 1 Right Midaxillary 24 Fr. 4 days         Chest Tube 04/23/25 Tube - 2 Right Midaxillary 28 Fr. 4 days              Peripheral Intravenous Line  Duration                  Peripheral IV - Single Lumen 04/23/25 0728 20 G Left Antecubital 4 days                     Physical Exam  Vitals and nursing note reviewed.   Constitutional:       General: She is not in acute distress.      Appearance: Normal appearance.   HENT:      Head: Normocephalic and atraumatic.      Nose: Nose normal. No congestion.      Mouth/Throat:      Mouth: Mucous membranes are moist.   Eyes:      General: No scleral icterus.     Extraocular Movements: Extraocular movements intact.      Conjunctiva/sclera: Conjunctivae normal.      Pupils: Pupils are equal, round, and reactive to light.   Neck:      Thyroid: No thyroid mass or thyromegaly.      Vascular: No carotid bruit or JVD.   Cardiovascular:      Rate and Rhythm: Normal rate and regular rhythm.      Pulses: Normal pulses.           Carotid pulses are 2+ on the right side and 2+ on the left side.       Radial pulses are 2+ on the right side and 2+ on the left side.        Femoral pulses are 2+ on the right side and 2+ on the left side.       Dorsalis pedis pulses are 2+ on the right side and 2+ on the left side.        Posterior tibial pulses are 2+ on the right side and 2+ on the left side.      Heart sounds: No murmur heard.     No friction rub. No gallop.   Pulmonary:      Effort: Pulmonary effort is normal. No respiratory distress.      Breath sounds: No stridor. Rhonchi present. No wheezing or rales.   Chest:      Chest wall: No tenderness.   Abdominal:      General: Abdomen is flat. Bowel sounds are normal. There is no distension.      Palpations: Abdomen is soft. There is no hepatomegaly, splenomegaly, mass or pulsatile mass.      Tenderness: There is no abdominal tenderness. There is no rebound.   Musculoskeletal:         General: No swelling. Normal range of motion.      Cervical back: Normal range of motion. No rigidity or tenderness.      Right lower leg: No edema.      Left lower leg: No edema.   Lymphadenopathy:      Cervical: No cervical adenopathy.      Upper Body:      Right upper body: No supraclavicular or axillary adenopathy.      Left upper body: No supraclavicular or axillary adenopathy.   Skin:     General: Skin is warm and dry.   Neurological:  "     General: No focal deficit present.      Mental Status: She is alert and oriented to person, place, and time. Mental status is at baseline.      Cranial Nerves: No cranial nerve deficit.      Sensory: No sensory deficit.      Motor: No weakness.      Gait: Gait normal.   Psychiatric:         Mood and Affect: Mood normal.            Significant Labs:  BMP: No results for input(s): "GLU", "NA", "K", "CL", "CO2", "BUN", "CREATININE", "CALCIUM", "MG" in the last 48 hours.  CBC: No results for input(s): "WBC", "RBC", "HGB", "HCT", "PLT", "MCV", "MCH", "MCHC" in the last 48 hours.  All pertinent labs from the last 24 hours have been reviewed.    Significant Diagnostics:  CXR: I have reviewed all pertinent results/findings within the past 24 hours  I have reviewed and interpreted all pertinent imaging results/findings within the past 24 hours.  Assessment/Plan:     No notes have been filed under this hospital service.  Service: Cardiovascular Surgery      Lewis Lees MD  Cardiothoracic Surgery  [facility] - 6th Floor Medical Telemetry  "

## 2025-04-27 NOTE — SUBJECTIVE & OBJECTIVE
Interval History:  Patient is doing well postoperative day 4 from right upper lobectomy.  Chest tube output acceptable.  Continues with small air leak.  Will discontinue Ezequiel drain and continue chest tube suction at 20 cm of water pressure.  Increase incentive spirometry and ambulation    Review of Systems   Constitutional: Negative. Negative for chills, diaphoresis, fever and night sweats.   HENT:  Negative for hoarse voice, sore throat and stridor.    Eyes: Negative.  Negative for blurred vision and double vision.   Cardiovascular:  Negative for chest pain, claudication, cyanosis, dyspnea on exertion, irregular heartbeat, leg swelling, orthopnea, palpitations, paroxysmal nocturnal dyspnea and syncope.   Respiratory:  Negative for cough, hemoptysis, shortness of breath, sputum production and wheezing.    Endocrine: Negative for polydipsia and polyuria.   Hematologic/Lymphatic: Negative for adenopathy and bleeding problem.   Gastrointestinal:  Negative for abdominal pain, diarrhea, heartburn, hematemesis, hematochezia, nausea and vomiting.   Neurological:  Negative for brief paralysis, disturbances in coordination, dizziness, focal weakness, headaches, numbness and paresthesias.     Medications:  Continuous Infusions:  Scheduled Meds:   docusate sodium  100 mg Oral BID    famotidine  20 mg Oral BID    loperamide  4 mg Oral Once     PRN Meds:  Current Facility-Administered Medications:     acetaminophen, 650 mg, Oral, Q4H PRN    albuterol-ipratropium, 3 mL, Nebulization, Q6H PRN    dextrose 50%, 12.5 g, Intravenous, PRN    dextrose 50%, 25 g, Intravenous, PRN    diphenhydrAMINE, 25 mg, Oral, Q6H PRN    glucagon (human recombinant), 1 mg, Intramuscular, PRN    glucose, 16 g, Oral, PRN    glucose, 24 g, Oral, PRN    HYDROcodone-acetaminophen, 1 tablet, Oral, Q4H PRN    HYDROcodone-acetaminophen, 1 tablet, Oral, Q4H PRN    insulin aspart U-100, 0-15 Units, Subcutaneous, QID (AC + HS) PRN    melatonin, 6 mg, Oral,  Nightly PRN    metoclopramide, 5 mg, Intravenous, Q6H PRN    ondansetron, 8 mg, Oral, Q8H PRN    polyethylene glycol, 17 g, Oral, BID PRN     Objective:     Vital Signs (Most Recent):  Temp: 97.4 °F (36.3 °C) (04/27/25 0740)  Pulse: 80 (04/27/25 0740)  Resp: 16 (04/27/25 0339)  BP: 131/80 (04/27/25 0740)  SpO2: 99 % (04/27/25 0740) Vital Signs (24h Range):  Temp:  [97.4 °F (36.3 °C)-98.2 °F (36.8 °C)] 97.4 °F (36.3 °C)  Pulse:  [75-96] 80  Resp:  [16-20] 16  SpO2:  [93 %-99 %] 99 %  BP: (127-177)/(60-80) 131/80     Weight: 68.8 kg (151 lb 10.8 oz)  Body mass index is 25.24 kg/m².    SpO2: 99 %       Intake/Output - Last 3 Shifts         04/25 0700 04/26 0659 04/26 0700  04/27 0659 04/27 0700  04/28 0659    P.O. 1360 630     Total Intake(mL/kg) 1360 (19.5) 630 (9.2)     Urine (mL/kg/hr) 0 (0) 0 (0)     Stool 0      Chest Tube 280 300     Total Output 280 300     Net +1080 +330            Urine Occurrence 6 x 5 x     Stool Occurrence 0 x              Lines/Drains/Airways       Drain  Duration                  Chest Tube 04/23/25 Tube - 1 Right Midaxillary 24 Fr. 4 days         Chest Tube 04/23/25 Tube - 2 Right Midaxillary 28 Fr. 4 days              Peripheral Intravenous Line  Duration                  Peripheral IV - Single Lumen 04/23/25 0728 20 G Left Antecubital 4 days                     Physical Exam  Vitals and nursing note reviewed.   Constitutional:       General: She is not in acute distress.     Appearance: Normal appearance.   HENT:      Head: Normocephalic and atraumatic.      Nose: Nose normal. No congestion.      Mouth/Throat:      Mouth: Mucous membranes are moist.   Eyes:      General: No scleral icterus.     Extraocular Movements: Extraocular movements intact.      Conjunctiva/sclera: Conjunctivae normal.      Pupils: Pupils are equal, round, and reactive to light.   Neck:      Thyroid: No thyroid mass or thyromegaly.      Vascular: No carotid bruit or JVD.   Cardiovascular:      Rate and Rhythm:  "Normal rate and regular rhythm.      Pulses: Normal pulses.           Carotid pulses are 2+ on the right side and 2+ on the left side.       Radial pulses are 2+ on the right side and 2+ on the left side.        Femoral pulses are 2+ on the right side and 2+ on the left side.       Dorsalis pedis pulses are 2+ on the right side and 2+ on the left side.        Posterior tibial pulses are 2+ on the right side and 2+ on the left side.      Heart sounds: No murmur heard.     No friction rub. No gallop.   Pulmonary:      Effort: Pulmonary effort is normal. No respiratory distress.      Breath sounds: No stridor. Rhonchi present. No wheezing or rales.   Chest:      Chest wall: No tenderness.   Abdominal:      General: Abdomen is flat. Bowel sounds are normal. There is no distension.      Palpations: Abdomen is soft. There is no hepatomegaly, splenomegaly, mass or pulsatile mass.      Tenderness: There is no abdominal tenderness. There is no rebound.   Musculoskeletal:         General: No swelling. Normal range of motion.      Cervical back: Normal range of motion. No rigidity or tenderness.      Right lower leg: No edema.      Left lower leg: No edema.   Lymphadenopathy:      Cervical: No cervical adenopathy.      Upper Body:      Right upper body: No supraclavicular or axillary adenopathy.      Left upper body: No supraclavicular or axillary adenopathy.   Skin:     General: Skin is warm and dry.   Neurological:      General: No focal deficit present.      Mental Status: She is alert and oriented to person, place, and time. Mental status is at baseline.      Cranial Nerves: No cranial nerve deficit.      Sensory: No sensory deficit.      Motor: No weakness.      Gait: Gait normal.   Psychiatric:         Mood and Affect: Mood normal.            Significant Labs:  BMP: No results for input(s): "GLU", "NA", "K", "CL", "CO2", "BUN", "CREATININE", "CALCIUM", "MG" in the last 48 hours.  CBC: No results for input(s): "WBC", " ""RBC", "HGB", "HCT", "PLT", "MCV", "MCH", "MCHC" in the last 48 hours.  All pertinent labs from the last 24 hours have been reviewed.    Significant Diagnostics:  CXR: I have reviewed all pertinent results/findings within the past 24 hours  I have reviewed and interpreted all pertinent imaging results/findings within the past 24 hours.  "

## 2025-04-28 ENCOUNTER — TELEPHONE (OUTPATIENT)
Dept: PHARMACY | Facility: CLINIC | Age: 60
End: 2025-04-28
Payer: COMMERCIAL

## 2025-04-28 LAB
POCT GLUCOSE: 146 MG/DL (ref 70–110)
POCT GLUCOSE: 172 MG/DL (ref 70–110)
POCT GLUCOSE: 244 MG/DL (ref 70–110)
POCT GLUCOSE: 264 MG/DL (ref 70–110)

## 2025-04-28 PROCEDURE — 25000003 PHARM REV CODE 250

## 2025-04-28 PROCEDURE — 21400001 HC TELEMETRY ROOM

## 2025-04-28 PROCEDURE — 25000003 PHARM REV CODE 250: Performed by: STUDENT IN AN ORGANIZED HEALTH CARE EDUCATION/TRAINING PROGRAM

## 2025-04-28 PROCEDURE — 63600175 PHARM REV CODE 636 W HCPCS: Performed by: STUDENT IN AN ORGANIZED HEALTH CARE EDUCATION/TRAINING PROGRAM

## 2025-04-28 RX ORDER — NIFEDIPINE 60 MG/1
60 TABLET, EXTENDED RELEASE ORAL DAILY
Status: DISCONTINUED | OUTPATIENT
Start: 2025-04-28 | End: 2025-05-06 | Stop reason: HOSPADM

## 2025-04-28 RX ORDER — LOSARTAN POTASSIUM 50 MG/1
100 TABLET ORAL DAILY
Status: DISCONTINUED | OUTPATIENT
Start: 2025-04-28 | End: 2025-04-29

## 2025-04-28 RX ADMIN — FAMOTIDINE 20 MG: 20 TABLET, FILM COATED ORAL at 08:04

## 2025-04-28 RX ADMIN — HYDROCODONE BITARTRATE AND ACETAMINOPHEN 1 TABLET: 5; 325 TABLET ORAL at 08:04

## 2025-04-28 RX ADMIN — INSULIN ASPART 2 UNITS: 100 INJECTION, SOLUTION INTRAVENOUS; SUBCUTANEOUS at 08:04

## 2025-04-28 RX ADMIN — HYDROCODONE BITARTRATE AND ACETAMINOPHEN 1 TABLET: 5; 325 TABLET ORAL at 05:04

## 2025-04-28 RX ADMIN — Medication 6 MG: at 08:04

## 2025-04-28 RX ADMIN — HYDROCODONE BITARTRATE AND ACETAMINOPHEN 1 TABLET: 10; 325 TABLET ORAL at 11:04

## 2025-04-28 RX ADMIN — POLYETHYLENE GLYCOL 3350 17 G: 17 POWDER, FOR SOLUTION ORAL at 04:04

## 2025-04-28 RX ADMIN — HYDROCODONE BITARTRATE AND ACETAMINOPHEN 1 TABLET: 5; 325 TABLET ORAL at 01:04

## 2025-04-28 RX ADMIN — DOCUSATE SODIUM 100 MG: 100 CAPSULE, LIQUID FILLED ORAL at 08:04

## 2025-04-28 RX ADMIN — HYDROCODONE BITARTRATE AND ACETAMINOPHEN 1 TABLET: 5; 325 TABLET ORAL at 04:04

## 2025-04-28 RX ADMIN — INSULIN ASPART 6 UNITS: 100 INJECTION, SOLUTION INTRAVENOUS; SUBCUTANEOUS at 11:04

## 2025-04-28 RX ADMIN — NIFEDIPINE 60 MG: 60 TABLET, FILM COATED, EXTENDED RELEASE ORAL at 08:04

## 2025-04-28 RX ADMIN — INSULIN ASPART 6 UNITS: 100 INJECTION, SOLUTION INTRAVENOUS; SUBCUTANEOUS at 04:04

## 2025-04-28 RX ADMIN — INSULIN ASPART 6 UNITS: 100 INJECTION, SOLUTION INTRAVENOUS; SUBCUTANEOUS at 05:04

## 2025-04-28 NOTE — PROGRESS NOTES
Jocelyne Dickinson is a 60 y.o. female patient.   1. Nodule of apex of right lung      Past Medical History:   Diagnosis Date    Abdominal pain     resolved    Anxiety disorder, unspecified     Carotid artery stenosis     Cellulitis of scalp     resolved    COPD (chronic obstructive pulmonary disease)     Diabetes mellitus     Digestive disorder     acid reflux    Disorder of pancreas     Disorder of pancreatic duct     Emphysema, unspecified     Fatigue     Gastrointestinal tract imaging abnormality     HTN (hypertension)     Mixed hyperlipidemia     Neuropathy     Nodule of apex of right lung     Osteoarthritis     Pancreatitis     Personal history of nicotine dependence     Pneumonia, unspecified organism     Shortness of breath     Syncope     Weight loss      No past surgical history pertinent negatives on file.  Scheduled Meds:   docusate sodium  100 mg Oral BID    famotidine  20 mg Oral BID    loperamide  4 mg Oral Once    losartan  100 mg Oral Daily    NIFEdipine  60 mg Oral Daily     Continuous Infusions:  PRN Meds:  Current Facility-Administered Medications:     acetaminophen, 650 mg, Oral, Q4H PRN    albuterol-ipratropium, 3 mL, Nebulization, Q6H PRN    dextrose 50%, 12.5 g, Intravenous, PRN    dextrose 50%, 25 g, Intravenous, PRN    diphenhydrAMINE, 25 mg, Oral, Q6H PRN    glucagon (human recombinant), 1 mg, Intramuscular, PRN    glucose, 16 g, Oral, PRN    glucose, 24 g, Oral, PRN    HYDROcodone-acetaminophen, 1 tablet, Oral, Q4H PRN    HYDROcodone-acetaminophen, 1 tablet, Oral, Q4H PRN    insulin aspart U-100, 0-15 Units, Subcutaneous, QID (AC + HS) PRN    melatonin, 6 mg, Oral, Nightly PRN    metoclopramide, 5 mg, Intravenous, Q6H PRN    ondansetron, 8 mg, Oral, Q8H PRN    polyethylene glycol, 17 g, Oral, BID PRN    Review of patient's allergies indicates:   Allergen Reactions    Aller ext-american cockroach Itching and Other (See Comments)    Allerg ext-tree poll-red maple Itching and Other (See  "Comments)    Cat hair standardized allergenic extract Itching and Other (See Comments)    Dog hair standardized allergenic extract Itching and Other (See Comments)    Grass pollen-ruslan, standard Itching and Other (See Comments)    Horse dander Itching and Other (See Comments)    Tree pollen-box elder Itching and Other (See Comments)    Tree pollen-pecan Itching and Other (See Comments)    Trulicity [dulaglutide] Other (See Comments)     Patient had pancreatitis       There are no hospital problems to display for this patient.    Blood pressure (!) 194/77, pulse 64, temperature 98.2 °F (36.8 °C), temperature source Oral, resp. rate 18, height 5' 5" (1.651 m), weight 68 kg (149 lb 14.6 oz), SpO2 95%, not currently breastfeeding.    Subjective:    NAEO   AF   SBP 190s   Pain controlled       Objective:   Vitals:    04/28/25 0527   BP:    Pulse:    Resp: 18   Temp:      Gen: NAD  HEENT: anicteric sclera, MMM  Chest: RR, unlabored respirations, CT x1 to suction, no air leak, serosang output   Abd: s/nt/nd  Ext: well perfused, no edema      CXR: persistent R apical PTX        Assesment/Plan:    60F s/p RUL 4/23    -Restart home BP meds  -Continue insulin sliding scale  -Continue CT to suction - will discuss management w staff   -TAMMIE, DANNI Johnson MD  4/28/2025    "

## 2025-04-28 NOTE — TELEPHONE ENCOUNTER
[facility]/Population Health Chart Review & Patient Outreach Details For Medication Adherence Project    Reason for Outreach Encounter: 3rd Party payor non-compliance report ([employer], [employer], [employer], etc)  2.  Patient Outreach Method: Reviewed patient chart   3.   Medication in question:    Hyperlipidemia Medications              atorvastatin (LIPITOR) 40 MG tablet Take 40 mg by mouth once daily.               LF 30 ds 4/19/25    4.  Reviewed and or Updates Made To: Patient Chart  5. Outreach Outcomes and/or actions taken: Patient filled medication and is on track to be adherent  Additional Notes:

## 2025-04-29 LAB
GLUCOSE SERPL-MCNC: 310 MG/DL (ref 70–110)
POCT GLUCOSE: 236 MG/DL (ref 70–110)
POCT GLUCOSE: 253 MG/DL (ref 70–110)
POCT GLUCOSE: 280 MG/DL (ref 70–110)
POCT GLUCOSE: 302 MG/DL (ref 70–110)
POCT GLUCOSE: 345 MG/DL (ref 70–110)

## 2025-04-29 PROCEDURE — 25000003 PHARM REV CODE 250: Performed by: THORACIC SURGERY (CARDIOTHORACIC VASCULAR SURGERY)

## 2025-04-29 PROCEDURE — 25000003 PHARM REV CODE 250: Performed by: STUDENT IN AN ORGANIZED HEALTH CARE EDUCATION/TRAINING PROGRAM

## 2025-04-29 PROCEDURE — 63600175 PHARM REV CODE 636 W HCPCS: Performed by: STUDENT IN AN ORGANIZED HEALTH CARE EDUCATION/TRAINING PROGRAM

## 2025-04-29 PROCEDURE — 63600175 PHARM REV CODE 636 W HCPCS

## 2025-04-29 PROCEDURE — 25000003 PHARM REV CODE 250

## 2025-04-29 PROCEDURE — 21400001 HC TELEMETRY ROOM

## 2025-04-29 RX ORDER — LOSARTAN POTASSIUM 50 MG/1
100 TABLET ORAL NIGHTLY
Status: DISCONTINUED | OUTPATIENT
Start: 2025-04-29 | End: 2025-05-06 | Stop reason: HOSPADM

## 2025-04-29 RX ORDER — INSULIN GLARGINE 100 [IU]/ML
20 INJECTION, SOLUTION SUBCUTANEOUS DAILY
Status: DISCONTINUED | OUTPATIENT
Start: 2025-04-29 | End: 2025-05-06 | Stop reason: HOSPADM

## 2025-04-29 RX ADMIN — INSULIN GLARGINE 20 UNITS: 100 INJECTION, SOLUTION SUBCUTANEOUS at 09:04

## 2025-04-29 RX ADMIN — HYDROCODONE BITARTRATE AND ACETAMINOPHEN 1 TABLET: 5; 325 TABLET ORAL at 11:04

## 2025-04-29 RX ADMIN — DOCUSATE SODIUM 100 MG: 100 CAPSULE, LIQUID FILLED ORAL at 08:04

## 2025-04-29 RX ADMIN — HYDROCODONE BITARTRATE AND ACETAMINOPHEN 1 TABLET: 5; 325 TABLET ORAL at 01:04

## 2025-04-29 RX ADMIN — Medication 6 MG: at 08:04

## 2025-04-29 RX ADMIN — HYDROCODONE BITARTRATE AND ACETAMINOPHEN 1 TABLET: 5; 325 TABLET ORAL at 05:04

## 2025-04-29 RX ADMIN — HYDROCODONE BITARTRATE AND ACETAMINOPHEN 1 TABLET: 5; 325 TABLET ORAL at 10:04

## 2025-04-29 RX ADMIN — LOSARTAN POTASSIUM 100 MG: 50 TABLET, FILM COATED ORAL at 08:04

## 2025-04-29 RX ADMIN — INSULIN ASPART 6 UNITS: 100 INJECTION, SOLUTION INTRAVENOUS; SUBCUTANEOUS at 11:04

## 2025-04-29 RX ADMIN — FAMOTIDINE 20 MG: 20 TABLET, FILM COATED ORAL at 08:04

## 2025-04-29 RX ADMIN — INSULIN ASPART 8 UNITS: 100 INJECTION, SOLUTION INTRAVENOUS; SUBCUTANEOUS at 08:04

## 2025-04-29 RX ADMIN — INSULIN ASPART 6 UNITS: 100 INJECTION, SOLUTION INTRAVENOUS; SUBCUTANEOUS at 06:04

## 2025-04-29 RX ADMIN — NIFEDIPINE 60 MG: 60 TABLET, FILM COATED, EXTENDED RELEASE ORAL at 06:04

## 2025-04-30 LAB
POCT GLUCOSE: 226 MG/DL (ref 70–110)
POCT GLUCOSE: 238 MG/DL (ref 70–110)
POCT GLUCOSE: 329 MG/DL (ref 70–110)

## 2025-04-30 PROCEDURE — 63600175 PHARM REV CODE 636 W HCPCS: Performed by: STUDENT IN AN ORGANIZED HEALTH CARE EDUCATION/TRAINING PROGRAM

## 2025-04-30 PROCEDURE — 25000003 PHARM REV CODE 250

## 2025-04-30 PROCEDURE — 25000003 PHARM REV CODE 250: Performed by: PHYSICIAN ASSISTANT

## 2025-04-30 PROCEDURE — 25000003 PHARM REV CODE 250: Performed by: THORACIC SURGERY (CARDIOTHORACIC VASCULAR SURGERY)

## 2025-04-30 PROCEDURE — 63600175 PHARM REV CODE 636 W HCPCS

## 2025-04-30 PROCEDURE — 21400001 HC TELEMETRY ROOM

## 2025-04-30 PROCEDURE — 25000003 PHARM REV CODE 250: Performed by: STUDENT IN AN ORGANIZED HEALTH CARE EDUCATION/TRAINING PROGRAM

## 2025-04-30 RX ORDER — ALPRAZOLAM 0.5 MG/1
0.5 TABLET ORAL 2 TIMES DAILY PRN
Status: DISCONTINUED | OUTPATIENT
Start: 2025-04-30 | End: 2025-05-06 | Stop reason: HOSPADM

## 2025-04-30 RX ORDER — MIRTAZAPINE 15 MG/1
15 TABLET, FILM COATED ORAL NIGHTLY
Status: DISCONTINUED | OUTPATIENT
Start: 2025-04-30 | End: 2025-05-06 | Stop reason: HOSPADM

## 2025-04-30 RX ADMIN — FAMOTIDINE 20 MG: 20 TABLET, FILM COATED ORAL at 08:04

## 2025-04-30 RX ADMIN — INSULIN GLARGINE 20 UNITS: 100 INJECTION, SOLUTION SUBCUTANEOUS at 08:04

## 2025-04-30 RX ADMIN — INSULIN ASPART 6 UNITS: 100 INJECTION, SOLUTION INTRAVENOUS; SUBCUTANEOUS at 05:04

## 2025-04-30 RX ADMIN — DOCUSATE SODIUM 100 MG: 100 CAPSULE, LIQUID FILLED ORAL at 08:04

## 2025-04-30 RX ADMIN — HYDROCODONE BITARTRATE AND ACETAMINOPHEN 1 TABLET: 5; 325 TABLET ORAL at 10:04

## 2025-04-30 RX ADMIN — HYDROCODONE BITARTRATE AND ACETAMINOPHEN 1 TABLET: 10; 325 TABLET ORAL at 08:04

## 2025-04-30 RX ADMIN — LOSARTAN POTASSIUM 100 MG: 50 TABLET, FILM COATED ORAL at 08:04

## 2025-04-30 RX ADMIN — ALPRAZOLAM 0.5 MG: 0.5 TABLET ORAL at 08:04

## 2025-04-30 RX ADMIN — MIRTAZAPINE 15 MG: 15 TABLET, FILM COATED ORAL at 08:04

## 2025-04-30 RX ADMIN — HYDROCODONE BITARTRATE AND ACETAMINOPHEN 1 TABLET: 5; 325 TABLET ORAL at 03:04

## 2025-04-30 RX ADMIN — HYDROCODONE BITARTRATE AND ACETAMINOPHEN 1 TABLET: 5; 325 TABLET ORAL at 04:04

## 2025-04-30 RX ADMIN — NIFEDIPINE 60 MG: 60 TABLET, FILM COATED, EXTENDED RELEASE ORAL at 08:04

## 2025-04-30 NOTE — PROGRESS NOTES
Inpatient Nutrition Assessment    Admit Date: 4/23/2025   Total duration of encounter: 7 days   Patient Age: 60 y.o.    Nutrition Recommendation/Prescription     Continue current diet (Diet Consistent Carbohydrate 2000 Calories (up to 75 gm per meal); Standard Tray) as tolerated.   Add Boost Glucose Control (provides 190 kcal, 16 g protein per serving) TID    Communication of Recommendations: reviewed with patient and reviewed with family    Nutrition Assessment     Malnutrition Assessment/Nutrition-Focused Physical Exam  Does not meet criteria.                                                               A minimum of two characteristics is recommended for diagnosis of either severe or non-severe malnutrition.    Chart Review    Reason Seen: length of stay    Malnutrition Screening Tool Results   Have you recently lost weight without trying?: No  Have you been eating poorly because of a decreased appetite?: No   MST Score: 0   Diagnosis:  Nodule of apex of right lung s/p lobectomy    Relevant Medical History:   COPD< DM, HTN, HLD, neuropathy, pancreatitis    Scheduled Medications:  docusate sodium, 100 mg, BID  famotidine, 20 mg, BID  insulin glargine U-100 (Lantus), 20 Units, Daily  loperamide, 4 mg, Once  losartan, 100 mg, QHS  NIFEdipine, 60 mg, Daily    Continuous Infusions:   PRN Medications:  acetaminophen, 650 mg, Q4H PRN  albuterol-ipratropium, 3 mL, Q6H PRN  dextrose 50%, 12.5 g, PRN  dextrose 50%, 25 g, PRN  diphenhydrAMINE, 25 mg, Q6H PRN  glucagon (human recombinant), 1 mg, PRN  glucose, 16 g, PRN  glucose, 24 g, PRN  HYDROcodone-acetaminophen, 1 tablet, Q4H PRN  HYDROcodone-acetaminophen, 1 tablet, Q4H PRN  insulin aspart U-100, 0-15 Units, QID (AC + HS) PRN  melatonin, 6 mg, Nightly PRN  metoclopramide, 5 mg, Q6H PRN  ondansetron, 8 mg, Q8H PRN  polyethylene glycol, 17 g, BID PRN    Calorie Containing IV Medications: no significant kcals from medications at this time    Recent Labs   Lab  "25  1513 25  0436 25  0616   NA  --  137 138   K  --  4.8 4.3   CALCIUM  --  8.1* 7.9*   CL  --  108* 105   CO2  --  24 26   BUN  --  10.7 10.8   CREATININE  --  0.82 0.91   EGFRNORACEVR  --  >60 >60   GLU  --  240* 201*   WBC 11.04 8.93  --    HGB 11.9* 11.4*  --    HCT 37.7 35.5*  --      Nutrition Orders:  Diet Consistent Carbohydrate 2000 Calories (up to 75 gm per meal); Standard Tray      Appetite/Oral Intake: fair/50-75% of meals  Factors Affecting Nutritional Intake: decreased appetite  Social Needs Impacting Access to Food: none identified  Food/Jainism/Cultural Preferences: none reported  Food Allergies: no known food allergies  Last Bowel Movement: 25  Wound(s):  Incision to R upper quadrant noted    Comments    25: Pt reports fair appetite since admit, eating ~50% of most meals. Pt interested in trying Boost supplements to aid in meeting energy needs. No appetite issues or unintended wt loss PTA reported. Pt reports recent wt gain.     Anthropometrics    Height: 5' 5" (165.1 cm), Height Method: Stated  Last Weight: 66.3 kg (146 lb 2.6 oz) (25 0504), Weight Method: Standard Scale  BMI (Calculated): 24.3  BMI Classification: normal (BMI 18.5-24.9)     Ideal Body Weight (IBW), Female: 125 lb     % Ideal Body Weight, Female (lb): 120.8 %                    Usual Body Weight (UBW), k.9 kg  % Usual Body Weight: 93.71     Usual Weight Provided By: patient    Wt Readings from Last 5 Encounters:   25 66.3 kg (146 lb 2.6 oz)   04/10/25 72.1 kg (159 lb)   25 69.8 kg (153 lb 12.8 oz)   25 68.7 kg (151 lb 6.4 oz)   25 70.6 kg (155 lb 9.6 oz)     Weight Change(s) Since Admission:   (): -3.6 kg since admit  Wt Readings from Last 1 Encounters:   25 0504 66.3 kg (146 lb 2.6 oz)   25 0500 67.4 kg (148 lb 9.4 oz)   25 0600 68 kg (149 lb 14.6 oz)   25 0500 68.8 kg (151 lb 10.8 oz)   25 0500 69.6 kg (153 lb 7 oz)   25 " 2000 68.5 kg (151 lb)   04/23/25 0747 68.9 kg (151 lb 14.4 oz)   04/11/25 0852 69.9 kg (154 lb)   Admit Weight: 69.9 kg (154 lb) (04/11/25 0852), Weight Method: Stated    Estimated Needs    Weight Used For Calorie Calculations: 66.3 kg (146 lb 2.6 oz)  Energy Calorie Requirements (kcal): 1617 kcals (1.3 SFxMSJ)  Energy Need Method: Charles-St Jeor  Weight Used For Protein Calculations: 66.3 kg (146 lb 2.6 oz)  Protein Requirements:  g (1.2-1.5 g/kg)  Fluid Requirements (mL): 1617 mL (1 mL/kcal) or per MD  CHO Requirement: 182 g/day (45% of total EEN)     Enteral Nutrition     Patient not receiving enteral nutrition at this time.    Parenteral Nutrition     Patient not receiving parenteral nutrition support at this time.    Evaluation of Received Nutrient Intake    Calories: not meeting estimated needs  Protein: not meeting estimated needs    Patient Education     Not applicable.    Nutrition Diagnosis     PES: Inadequate energy intake related to acute illness as evidenced by decreased appetite, meeting <75% needs since admit. (new)     PES:            Nutrition Interventions     Intervention(s): modified composition of meals/snacks and commercial beverage  Intervention(s):      Goal: Meet greater than 80% of nutritional needs by follow-up. (new)  Goal: Consume % of oral supplements by follow-up. (new)    Nutrition Goals & Monitoring     Dietitian will monitor: energy intake, weight, and glucose/endocrine profile  Discharge planning: continue Diabetic diet  Nutrition Risk/Follow-Up: patient at increased nutrition risk; dietitian will follow-up twice weekly   Please consult if re-assessment needed sooner.

## 2025-04-30 NOTE — PROGRESS NOTES
Jocelyne Dickinson is a 60 y.o. female patient.   1. Nodule of apex of right lung      Past Medical History:   Diagnosis Date    Abdominal pain     resolved    Anxiety disorder, unspecified     Carotid artery stenosis     Cellulitis of scalp     resolved    COPD (chronic obstructive pulmonary disease)     Diabetes mellitus     Digestive disorder     acid reflux    Disorder of pancreas     Disorder of pancreatic duct     Emphysema, unspecified     Fatigue     Gastrointestinal tract imaging abnormality     HTN (hypertension)     Mixed hyperlipidemia     Neuropathy     Nodule of apex of right lung     Osteoarthritis     Pancreatitis     Personal history of nicotine dependence     Pneumonia, unspecified organism     Shortness of breath     Syncope     Weight loss      No past surgical history pertinent negatives on file.  Scheduled Meds:   docusate sodium  100 mg Oral BID    famotidine  20 mg Oral BID    insulin glargine U-100 (Lantus)  20 Units Subcutaneous Daily    loperamide  4 mg Oral Once    losartan  100 mg Oral QHS    NIFEdipine  60 mg Oral Daily     Continuous Infusions:  PRN Meds:  Current Facility-Administered Medications:     acetaminophen, 650 mg, Oral, Q4H PRN    albuterol-ipratropium, 3 mL, Nebulization, Q6H PRN    dextrose 50%, 12.5 g, Intravenous, PRN    dextrose 50%, 25 g, Intravenous, PRN    diphenhydrAMINE, 25 mg, Oral, Q6H PRN    glucagon (human recombinant), 1 mg, Intramuscular, PRN    glucose, 16 g, Oral, PRN    glucose, 24 g, Oral, PRN    HYDROcodone-acetaminophen, 1 tablet, Oral, Q4H PRN    HYDROcodone-acetaminophen, 1 tablet, Oral, Q4H PRN    insulin aspart U-100, 0-15 Units, Subcutaneous, QID (AC + HS) PRN    melatonin, 6 mg, Oral, Nightly PRN    metoclopramide, 5 mg, Intravenous, Q6H PRN    ondansetron, 8 mg, Oral, Q8H PRN    polyethylene glycol, 17 g, Oral, BID PRN    Review of patient's allergies indicates:   Allergen Reactions    Aller ext-american cockroach Itching and Other (See Comments)  "   Allerg ext-tree poll-red maple Itching and Other (See Comments)    Cat hair standardized allergenic extract Itching and Other (See Comments)    Dog hair standardized allergenic extract Itching and Other (See Comments)    Grass pollen-ruslan, standard Itching and Other (See Comments)    Horse dander Itching and Other (See Comments)    Tree pollen-box elder Itching and Other (See Comments)    Tree pollen-pecan Itching and Other (See Comments)    Trulicity [dulaglutide] Other (See Comments)     Patient had pancreatitis       There are no hospital problems to display for this patient.    Blood pressure 97/73, pulse 69, temperature 97.5 °F (36.4 °C), temperature source Oral, resp. rate 16, height 5' 5" (1.651 m), weight 66.3 kg (146 lb 2.6 oz), SpO2 96%, not currently breastfeeding.    Subjective:    NAEO   AF   Blood sugars elevated - restarted on home lantus 20   serosang        Objective:   Vitals:    04/30/25 0504   BP: 97/73   Pulse: 69   Resp: 16   Temp: 97.5 °F (36.4 °C)     Gen: NAD  HEENT: anicteric sclera, MMM  Chest: RR, unlabored respirations, CT x1 to suction, +air leak, serosang output   Abd: s/nt/nd  Ext: well perfused, no edema      CXR: persistent R apical PTX        Assesment/Plan:    60F s/p RUL 4/23    -Continue home BP meds, home lantus, sliding scale insulin   -Continue CT to waterseal - will discuss management w staff   -IS, OANNABELLA Johnson MD  4/30/2025    "

## 2025-05-01 LAB
POCT GLUCOSE: 218 MG/DL (ref 70–110)
POCT GLUCOSE: 224 MG/DL (ref 70–110)
POCT GLUCOSE: 273 MG/DL (ref 70–110)
POCT GLUCOSE: 323 MG/DL (ref 70–110)

## 2025-05-01 PROCEDURE — 25000003 PHARM REV CODE 250: Performed by: PHYSICIAN ASSISTANT

## 2025-05-01 PROCEDURE — 21400001 HC TELEMETRY ROOM

## 2025-05-01 PROCEDURE — 25000003 PHARM REV CODE 250: Performed by: STUDENT IN AN ORGANIZED HEALTH CARE EDUCATION/TRAINING PROGRAM

## 2025-05-01 PROCEDURE — 25000003 PHARM REV CODE 250

## 2025-05-01 PROCEDURE — 63600175 PHARM REV CODE 636 W HCPCS: Performed by: STUDENT IN AN ORGANIZED HEALTH CARE EDUCATION/TRAINING PROGRAM

## 2025-05-01 PROCEDURE — 25000003 PHARM REV CODE 250: Performed by: THORACIC SURGERY (CARDIOTHORACIC VASCULAR SURGERY)

## 2025-05-01 PROCEDURE — 63600175 PHARM REV CODE 636 W HCPCS

## 2025-05-01 RX ORDER — CLOPIDOGREL BISULFATE 75 MG/1
75 TABLET ORAL DAILY
Status: DISCONTINUED | OUTPATIENT
Start: 2025-05-02 | End: 2025-05-06 | Stop reason: HOSPADM

## 2025-05-01 RX ORDER — ALBUTEROL SULFATE 90 UG/1
2 INHALANT RESPIRATORY (INHALATION) EVERY 6 HOURS PRN
Status: DISCONTINUED | OUTPATIENT
Start: 2025-05-01 | End: 2025-05-06 | Stop reason: HOSPADM

## 2025-05-01 RX ORDER — PIOGLITAZONE 15 MG/1
30 TABLET ORAL DAILY
Status: DISCONTINUED | OUTPATIENT
Start: 2025-05-01 | End: 2025-05-06 | Stop reason: HOSPADM

## 2025-05-01 RX ORDER — ATORVASTATIN CALCIUM 40 MG/1
40 TABLET, FILM COATED ORAL DAILY
Status: DISCONTINUED | OUTPATIENT
Start: 2025-05-01 | End: 2025-05-06 | Stop reason: HOSPADM

## 2025-05-01 RX ORDER — GLIMEPIRIDE 1 MG/1
2 TABLET ORAL 2 TIMES DAILY
Status: DISCONTINUED | OUTPATIENT
Start: 2025-05-01 | End: 2025-05-06 | Stop reason: HOSPADM

## 2025-05-01 RX ORDER — CITALOPRAM 20 MG/1
20 TABLET ORAL DAILY
Status: DISCONTINUED | OUTPATIENT
Start: 2025-05-01 | End: 2025-05-06 | Stop reason: HOSPADM

## 2025-05-01 RX ADMIN — HYDROCODONE BITARTRATE AND ACETAMINOPHEN 1 TABLET: 10; 325 TABLET ORAL at 05:05

## 2025-05-01 RX ADMIN — FAMOTIDINE 20 MG: 20 TABLET, FILM COATED ORAL at 08:05

## 2025-05-01 RX ADMIN — HYDROCODONE BITARTRATE AND ACETAMINOPHEN 1 TABLET: 10; 325 TABLET ORAL at 03:05

## 2025-05-01 RX ADMIN — MIRTAZAPINE 15 MG: 15 TABLET, FILM COATED ORAL at 08:05

## 2025-05-01 RX ADMIN — INSULIN ASPART 6 UNITS: 100 INJECTION, SOLUTION INTRAVENOUS; SUBCUTANEOUS at 05:05

## 2025-05-01 RX ADMIN — HYDROCODONE BITARTRATE AND ACETAMINOPHEN 1 TABLET: 5; 325 TABLET ORAL at 01:05

## 2025-05-01 RX ADMIN — DOCUSATE SODIUM 100 MG: 100 CAPSULE, LIQUID FILLED ORAL at 08:05

## 2025-05-01 RX ADMIN — HYDROCODONE BITARTRATE AND ACETAMINOPHEN 1 TABLET: 10; 325 TABLET ORAL at 08:05

## 2025-05-01 RX ADMIN — INSULIN GLARGINE 20 UNITS: 100 INJECTION, SOLUTION SUBCUTANEOUS at 08:05

## 2025-05-01 RX ADMIN — INSULIN ASPART 4 UNITS: 100 INJECTION, SOLUTION INTRAVENOUS; SUBCUTANEOUS at 11:05

## 2025-05-01 RX ADMIN — ATORVASTATIN CALCIUM 40 MG: 40 TABLET, FILM COATED ORAL at 09:05

## 2025-05-01 RX ADMIN — NIFEDIPINE 60 MG: 60 TABLET, FILM COATED, EXTENDED RELEASE ORAL at 08:05

## 2025-05-01 RX ADMIN — HYDROCODONE BITARTRATE AND ACETAMINOPHEN 1 TABLET: 10; 325 TABLET ORAL at 10:05

## 2025-05-01 RX ADMIN — GLIMEPIRIDE 2 MG: 1 TABLET ORAL at 08:05

## 2025-05-01 RX ADMIN — POLYETHYLENE GLYCOL 3350 17 G: 17 POWDER, FOR SOLUTION ORAL at 05:05

## 2025-05-01 RX ADMIN — GLIMEPIRIDE 2 MG: 1 TABLET ORAL at 09:05

## 2025-05-01 RX ADMIN — PIOGLITAZONE 30 MG: 15 TABLET ORAL at 09:05

## 2025-05-01 RX ADMIN — CITALOPRAM HYDROBROMIDE 20 MG: 20 TABLET ORAL at 09:05

## 2025-05-01 RX ADMIN — LOSARTAN POTASSIUM 100 MG: 50 TABLET, FILM COATED ORAL at 08:05

## 2025-05-01 RX ADMIN — INSULIN ASPART 4 UNITS: 100 INJECTION, SOLUTION INTRAVENOUS; SUBCUTANEOUS at 05:05

## 2025-05-01 NOTE — PROGRESS NOTES
Jocelyne Dickinson is a 60 y.o. female patient.   1. Nodule of apex of right lung      Past Medical History:   Diagnosis Date    Abdominal pain     resolved    Anxiety disorder, unspecified     Carotid artery stenosis     Cellulitis of scalp     resolved    COPD (chronic obstructive pulmonary disease)     Diabetes mellitus     Digestive disorder     acid reflux    Disorder of pancreas     Disorder of pancreatic duct     Emphysema, unspecified     Fatigue     Gastrointestinal tract imaging abnormality     HTN (hypertension)     Mixed hyperlipidemia     Neuropathy     Nodule of apex of right lung     Osteoarthritis     Pancreatitis     Personal history of nicotine dependence     Pneumonia, unspecified organism     Shortness of breath     Syncope     Weight loss      No past surgical history pertinent negatives on file.  Scheduled Meds:   atorvastatin  40 mg Oral Daily    citalopram  20 mg Oral Daily    [START ON 5/2/2025] clopidogreL  75 mg Oral Daily    docusate sodium  100 mg Oral BID    famotidine  20 mg Oral BID    glimepiride  2 mg Oral BID    insulin glargine U-100 (Lantus)  20 Units Subcutaneous Daily    loperamide  4 mg Oral Once    losartan  100 mg Oral QHS    mirtazapine  15 mg Oral QHS    NIFEdipine  60 mg Oral Daily    pioglitazone  30 mg Oral Daily     Continuous Infusions:  PRN Meds:  Current Facility-Administered Medications:     acetaminophen, 650 mg, Oral, Q4H PRN    albuterol, 2 puff, Inhalation, Q6H PRN    ALPRAZolam, 0.5 mg, Oral, BID PRN    dextrose 50%, 12.5 g, Intravenous, PRN    dextrose 50%, 25 g, Intravenous, PRN    diphenhydrAMINE, 25 mg, Oral, Q6H PRN    glucagon (human recombinant), 1 mg, Intramuscular, PRN    glucose, 16 g, Oral, PRN    glucose, 24 g, Oral, PRN    HYDROcodone-acetaminophen, 1 tablet, Oral, Q4H PRN    HYDROcodone-acetaminophen, 1 tablet, Oral, Q4H PRN    insulin aspart U-100, 0-15 Units, Subcutaneous, QID (AC + HS) PRN    melatonin, 6 mg, Oral, Nightly PRN     "metoclopramide, 5 mg, Intravenous, Q6H PRN    ondansetron, 8 mg, Oral, Q8H PRN    polyethylene glycol, 17 g, Oral, BID PRN    Review of patient's allergies indicates:   Allergen Reactions    Aller ext-american cockroach Itching and Other (See Comments)    Allerg ext-tree poll-red maple Itching and Other (See Comments)    Cat hair standardized allergenic extract Itching and Other (See Comments)    Dog hair standardized allergenic extract Itching and Other (See Comments)    Grass pollen-ruslan, standard Itching and Other (See Comments)    Horse dander Itching and Other (See Comments)    Tree pollen-box elder Itching and Other (See Comments)    Tree pollen-pecan Itching and Other (See Comments)    Trulicity [dulaglutide] Other (See Comments)     Patient had pancreatitis       There are no hospital problems to display for this patient.    Blood pressure 116/68, pulse 97, temperature 98 °F (36.7 °C), temperature source Oral, resp. rate 19, height 5' 5" (1.651 m), weight 66.4 kg (146 lb 6.2 oz), SpO2 98%, not currently breastfeeding.    Subjective:  POD8 s/p RUL. NAEON. VSS. Room air. Ct to suction, + Airleak.  In good sprits      Objective:   Up in bed  AAOx3  RRR  Ctab  Abd soft          Assesment/Plan:    Cont Ct to suction  Home meds  Am cxr     The case and plan of care discussed with Adult Cardiothoracic Surgery attending, Dr. Lees.       NINA Celis  5/1/2025   "

## 2025-05-02 LAB
POCT GLUCOSE: 220 MG/DL (ref 70–110)
POCT GLUCOSE: 228 MG/DL (ref 70–110)
POCT GLUCOSE: 377 MG/DL (ref 70–110)

## 2025-05-02 PROCEDURE — 63600175 PHARM REV CODE 636 W HCPCS

## 2025-05-02 PROCEDURE — 63600175 PHARM REV CODE 636 W HCPCS: Performed by: STUDENT IN AN ORGANIZED HEALTH CARE EDUCATION/TRAINING PROGRAM

## 2025-05-02 PROCEDURE — 25000003 PHARM REV CODE 250

## 2025-05-02 PROCEDURE — 25000003 PHARM REV CODE 250: Performed by: PHYSICIAN ASSISTANT

## 2025-05-02 PROCEDURE — 25000003 PHARM REV CODE 250: Performed by: THORACIC SURGERY (CARDIOTHORACIC VASCULAR SURGERY)

## 2025-05-02 PROCEDURE — 25000003 PHARM REV CODE 250: Performed by: STUDENT IN AN ORGANIZED HEALTH CARE EDUCATION/TRAINING PROGRAM

## 2025-05-02 PROCEDURE — 21400001 HC TELEMETRY ROOM

## 2025-05-02 RX ADMIN — FAMOTIDINE 20 MG: 20 TABLET, FILM COATED ORAL at 08:05

## 2025-05-02 RX ADMIN — CITALOPRAM HYDROBROMIDE 20 MG: 20 TABLET ORAL at 08:05

## 2025-05-02 RX ADMIN — GLIMEPIRIDE 2 MG: 1 TABLET ORAL at 08:05

## 2025-05-02 RX ADMIN — PIOGLITAZONE 30 MG: 15 TABLET ORAL at 08:05

## 2025-05-02 RX ADMIN — HYDROCODONE BITARTRATE AND ACETAMINOPHEN 1 TABLET: 5; 325 TABLET ORAL at 05:05

## 2025-05-02 RX ADMIN — HYDROCODONE BITARTRATE AND ACETAMINOPHEN 1 TABLET: 10; 325 TABLET ORAL at 12:05

## 2025-05-02 RX ADMIN — INSULIN GLARGINE 20 UNITS: 100 INJECTION, SOLUTION SUBCUTANEOUS at 08:05

## 2025-05-02 RX ADMIN — LOSARTAN POTASSIUM 100 MG: 50 TABLET, FILM COATED ORAL at 08:05

## 2025-05-02 RX ADMIN — DOCUSATE SODIUM 100 MG: 100 CAPSULE, LIQUID FILLED ORAL at 08:05

## 2025-05-02 RX ADMIN — HYDROCODONE BITARTRATE AND ACETAMINOPHEN 1 TABLET: 5; 325 TABLET ORAL at 12:05

## 2025-05-02 RX ADMIN — CLOPIDOGREL 75 MG: 75 TABLET ORAL at 08:05

## 2025-05-02 RX ADMIN — ATORVASTATIN CALCIUM 40 MG: 40 TABLET, FILM COATED ORAL at 08:05

## 2025-05-02 RX ADMIN — MIRTAZAPINE 15 MG: 15 TABLET, FILM COATED ORAL at 08:05

## 2025-05-02 RX ADMIN — HYDROCODONE BITARTRATE AND ACETAMINOPHEN 1 TABLET: 5; 325 TABLET ORAL at 08:05

## 2025-05-02 RX ADMIN — HYDROCODONE BITARTRATE AND ACETAMINOPHEN 1 TABLET: 5; 325 TABLET ORAL at 10:05

## 2025-05-02 RX ADMIN — INSULIN ASPART 8 UNITS: 100 INJECTION, SOLUTION INTRAVENOUS; SUBCUTANEOUS at 08:05

## 2025-05-02 RX ADMIN — INSULIN ASPART 4 UNITS: 100 INJECTION, SOLUTION INTRAVENOUS; SUBCUTANEOUS at 10:05

## 2025-05-02 RX ADMIN — NIFEDIPINE 60 MG: 60 TABLET, FILM COATED, EXTENDED RELEASE ORAL at 08:05

## 2025-05-02 NOTE — PROGRESS NOTES
Jocelyne Dickinson is a 60 y.o. female patient.   1. Nodule of apex of right lung      Past Medical History:   Diagnosis Date    Abdominal pain     resolved    Anxiety disorder, unspecified     Carotid artery stenosis     Cellulitis of scalp     resolved    COPD (chronic obstructive pulmonary disease)     Diabetes mellitus     Digestive disorder     acid reflux    Disorder of pancreas     Disorder of pancreatic duct     Emphysema, unspecified     Fatigue     Gastrointestinal tract imaging abnormality     HTN (hypertension)     Mixed hyperlipidemia     Neuropathy     Nodule of apex of right lung     Osteoarthritis     Pancreatitis     Personal history of nicotine dependence     Pneumonia, unspecified organism     Shortness of breath     Syncope     Weight loss      No past surgical history pertinent negatives on file.  Scheduled Meds:   atorvastatin  40 mg Oral Daily    citalopram  20 mg Oral Daily    clopidogreL  75 mg Oral Daily    docusate sodium  100 mg Oral BID    famotidine  20 mg Oral BID    glimepiride  2 mg Oral BID    insulin glargine U-100 (Lantus)  20 Units Subcutaneous Daily    loperamide  4 mg Oral Once    losartan  100 mg Oral QHS    mirtazapine  15 mg Oral QHS    NIFEdipine  60 mg Oral Daily    pioglitazone  30 mg Oral Daily     Continuous Infusions:  PRN Meds:  Current Facility-Administered Medications:     acetaminophen, 650 mg, Oral, Q4H PRN    albuterol, 2 puff, Inhalation, Q6H PRN    ALPRAZolam, 0.5 mg, Oral, BID PRN    dextrose 50%, 12.5 g, Intravenous, PRN    dextrose 50%, 25 g, Intravenous, PRN    diphenhydrAMINE, 25 mg, Oral, Q6H PRN    glucagon (human recombinant), 1 mg, Intramuscular, PRN    glucose, 16 g, Oral, PRN    glucose, 24 g, Oral, PRN    HYDROcodone-acetaminophen, 1 tablet, Oral, Q4H PRN    HYDROcodone-acetaminophen, 1 tablet, Oral, Q4H PRN    insulin aspart U-100, 0-15 Units, Subcutaneous, QID (AC + HS) PRN    melatonin, 6 mg, Oral, Nightly PRN    metoclopramide, 5 mg, Intravenous,  "Q6H PRN    ondansetron, 8 mg, Oral, Q8H PRN    polyethylene glycol, 17 g, Oral, BID PRN    Review of patient's allergies indicates:   Allergen Reactions    Aller ext-american cockroach Itching and Other (See Comments)    Allerg ext-tree poll-red maple Itching and Other (See Comments)    Cat hair standardized allergenic extract Itching and Other (See Comments)    Dog hair standardized allergenic extract Itching and Other (See Comments)    Grass pollen-ruslan, standard Itching and Other (See Comments)    Horse dander Itching and Other (See Comments)    Tree pollen-box elder Itching and Other (See Comments)    Tree pollen-pecan Itching and Other (See Comments)    Trulicity [dulaglutide] Other (See Comments)     Patient had pancreatitis       There are no hospital problems to display for this patient.    Blood pressure 125/71, pulse 65, temperature 97.4 °F (36.3 °C), temperature source Oral, resp. rate 16, height 5' 5" (1.651 m), weight 66.7 kg (147 lb 0.8 oz), SpO2 95%, not currently breastfeeding.    Subjective:  POD9 s/p RUL. NAEON. VSS. Room air. Ct to suction, no Airleak.  In good sprits        Objective:   Up in bed  AAOx3  RRR  Ctab  Abd soft              Assesment/Plan:    Cont Ct to suction 1 more day  Home meds  Am cxr      The case and plan of care discussed with Adult Cardiothoracic Surgery attending, Dr. Lees.       NINA Celis  5/2/2025   "

## 2025-05-02 NOTE — PROGRESS NOTES
Inpatient Nutrition Assessment    Admit Date: 4/23/2025   Total duration of encounter: 9 days   Patient Age: 60 y.o.    Nutrition Recommendation/Prescription     Continue current diet (Diet Consistent Carbohydrate 2000 Calories (up to 75 gm per meal); Standard Tray) as tolerated.   Continue Boost Glucose Control (provides 190 kcal, 16 g protein per serving) TID    Communication of Recommendations: reviewed with patient and reviewed with family    Nutrition Assessment     Malnutrition Assessment/Nutrition-Focused Physical Exam  Does not meet criteria.                                                               A minimum of two characteristics is recommended for diagnosis of either severe or non-severe malnutrition.    Chart Review    Reason Seen: length of stay and follow-up    Malnutrition Screening Tool Results   Have you recently lost weight without trying?: No  Have you been eating poorly because of a decreased appetite?: No   MST Score: 0   Diagnosis:  Nodule of apex of right lung s/p lobectomy    Relevant Medical History:   COPD, DM, HTN, HLD, neuropathy, pancreatitis    Scheduled Medications:  atorvastatin, 40 mg, Daily  citalopram, 20 mg, Daily  clopidogreL, 75 mg, Daily  docusate sodium, 100 mg, BID  famotidine, 20 mg, BID  glimepiride, 2 mg, BID  insulin glargine U-100 (Lantus), 20 Units, Daily  loperamide, 4 mg, Once  losartan, 100 mg, QHS  mirtazapine, 15 mg, QHS  NIFEdipine, 60 mg, Daily  pioglitazone, 30 mg, Daily    Continuous Infusions:   PRN Medications:  acetaminophen, 650 mg, Q4H PRN  albuterol, 2 puff, Q6H PRN  ALPRAZolam, 0.5 mg, BID PRN  dextrose 50%, 12.5 g, PRN  dextrose 50%, 25 g, PRN  diphenhydrAMINE, 25 mg, Q6H PRN  glucagon (human recombinant), 1 mg, PRN  glucose, 16 g, PRN  glucose, 24 g, PRN  HYDROcodone-acetaminophen, 1 tablet, Q4H PRN  HYDROcodone-acetaminophen, 1 tablet, Q4H PRN  insulin aspart U-100, 0-15 Units, QID (AC + HS) PRN  melatonin, 6 mg, Nightly PRN  metoclopramide, 5 mg,  "Q6H PRN  ondansetron, 8 mg, Q8H PRN  polyethylene glycol, 17 g, BID PRN    Calorie Containing IV Medications: no significant kcals from medications at this time    No results for input(s): "NA", "K", "CALCIUM", "PHOS", "MG", "CL", "CO2", "BUN", "CREATININE", "EGFRNORACEVR", "GLU", "BILITOT", "ALKPHOS", "ALT", "AST", "ALBUMIN", "PREALB", "CRP", "HSCRP", "TRIG", "HGBA1C", "AMMONIA", "LIPASE", "AMYLASE", "WBC", "HGB", "HCT" in the last 168 hours.    Nutrition Orders:  Diet Consistent Carbohydrate 2000 Calories (up to 75 gm per meal); Standard Tray  Dietary nutrition supplements All Meals; Boost Glucose Control - Any flavor    Appetite/Oral Intake: fair/50-75% of meals  Factors Affecting Nutritional Intake: decreased appetite  Social Needs Impacting Access to Food: none identified  Food/Zoroastrian/Cultural Preferences: none reported  Food Allergies: no known food allergies  Last Bowel Movement: 25  Wound(s):  Incision to R upper quadrant noted    Comments    25: Pt reports fair appetite since admit, eating ~50% of most meals. Pt interested in trying Boost supplements to aid in meeting energy needs. No appetite issues or unintended wt loss PTA reported. Pt reports recent wt gain.     25: Patient reports fair oral intake, estimates eating about 50% of meals. Denies any nausea, vomiting, diarrhea or constipation. Drinking Boost Glucose supplements.     Anthropometrics    Height: 5' 5" (165.1 cm), Height Method: Stated  Last Weight: 66.7 kg (147 lb 0.8 oz) (25 0500), Weight Method: Standard Scale  BMI (Calculated): 24.5  BMI Classification: normal (BMI 18.5-24.9)     Ideal Body Weight (IBW), Female: 125 lb     % Ideal Body Weight, Female (lb): 120.8 %                    Usual Body Weight (UBW), k.9 kg  % Usual Body Weight: 93.71     Usual Weight Provided By: patient    Wt Readings from Last 5 Encounters:   25 66.7 kg (147 lb 0.8 oz)   04/10/25 72.1 kg (159 lb)   25 69.8 kg (153 lb 12.8 " oz)   03/11/25 68.7 kg (151 lb 6.4 oz)   02/19/25 70.6 kg (155 lb 9.6 oz)     Weight Change(s) Since Admission:   (4/30): -3.6 kg since admit  Wt Readings from Last 1 Encounters:   05/02/25 0500 66.7 kg (147 lb 0.8 oz)   05/01/25 0500 66.4 kg (146 lb 6.2 oz)   04/30/25 0504 66.3 kg (146 lb 2.6 oz)   04/29/25 0500 67.4 kg (148 lb 9.4 oz)   04/28/25 0600 68 kg (149 lb 14.6 oz)   04/27/25 0500 68.8 kg (151 lb 10.8 oz)   04/25/25 0500 69.6 kg (153 lb 7 oz)   04/23/25 2000 68.5 kg (151 lb)   04/23/25 0747 68.9 kg (151 lb 14.4 oz)   04/11/25 0852 69.9 kg (154 lb)   Admit Weight: 69.9 kg (154 lb) (04/11/25 0852), Weight Method: Stated    Estimated Needs    Weight Used For Calorie Calculations: 66.3 kg (146 lb 2.6 oz)  Energy Calorie Requirements (kcal): 1617 kcals (1.3 SFxMSJ)  Energy Need Method: [place]-St Jeor  Weight Used For Protein Calculations: 66.3 kg (146 lb 2.6 oz)  Protein Requirements:  g (1.2-1.5 g/kg)  Fluid Requirements (mL): 1617 mL (1 mL/kcal) or per MD  CHO Requirement: 182 g/day (45% of total EEN)     Enteral Nutrition     Patient not receiving enteral nutrition at this time.    Parenteral Nutrition     Patient not receiving parenteral nutrition support at this time.    Evaluation of Received Nutrient Intake    Calories: not meeting estimated needs  Protein: not meeting estimated needs    Patient Education     Not applicable.    Nutrition Diagnosis     PES: Inadequate energy intake related to acute illness as evidenced by decreased appetite, meeting <75% needs since admit. (active)     PES:            Nutrition Interventions     Intervention(s): modified composition of meals/snacks and commercial beverage  Intervention(s):      Goal: Meet greater than 80% of nutritional needs by follow-up. (goal progressing)  Goal: Consume % of oral supplements by follow-up. (goal progressing)    Nutrition Goals & Monitoring     Dietitian will monitor: energy intake, weight, and glucose/endocrine  profile  Discharge planning: continue Diabetic diet  Nutrition Risk/Follow-Up: patient at increased nutrition risk; dietitian will follow-up twice weekly   Please consult if re-assessment needed sooner.

## 2025-05-03 LAB
AMMONIA PLAS-MSCNC: 21 UMOL/L (ref 18–72)
ANION GAP SERPL CALC-SCNC: 9 MEQ/L
BASOPHILS # BLD AUTO: 0.03 X10(3)/MCL
BASOPHILS NFR BLD AUTO: 0.3 %
BUN SERPL-MCNC: 19.9 MG/DL (ref 9.8–20.1)
CALCIUM SERPL-MCNC: 8.9 MG/DL (ref 8.4–10.2)
CHLORIDE SERPL-SCNC: 102 MMOL/L (ref 98–107)
CO2 SERPL-SCNC: 22 MMOL/L (ref 22–29)
CREAT SERPL-MCNC: 0.86 MG/DL (ref 0.55–1.02)
CREAT/UREA NIT SERPL: 23
EOSINOPHIL # BLD AUTO: 0.1 X10(3)/MCL (ref 0–0.9)
EOSINOPHIL NFR BLD AUTO: 1.1 %
ERYTHROCYTE [DISTWIDTH] IN BLOOD BY AUTOMATED COUNT: 13.7 % (ref 11.5–17)
GFR SERPLBLD CREATININE-BSD FMLA CKD-EPI: >60 ML/MIN/1.73/M2
GLUCOSE SERPL-MCNC: 251 MG/DL (ref 74–100)
HCT VFR BLD AUTO: 39.3 % (ref 37–47)
HGB BLD-MCNC: 12.7 G/DL (ref 12–16)
IMM GRANULOCYTES # BLD AUTO: 0.03 X10(3)/MCL (ref 0–0.04)
IMM GRANULOCYTES NFR BLD AUTO: 0.3 %
LYMPHOCYTES # BLD AUTO: 1.15 X10(3)/MCL (ref 0.6–4.6)
LYMPHOCYTES NFR BLD AUTO: 12.9 %
MCH RBC QN AUTO: 28.6 PG (ref 27–31)
MCHC RBC AUTO-ENTMCNC: 32.3 G/DL (ref 33–36)
MCV RBC AUTO: 88.5 FL (ref 80–94)
MONOCYTES # BLD AUTO: 0.85 X10(3)/MCL (ref 0.1–1.3)
MONOCYTES NFR BLD AUTO: 9.6 %
NEUTROPHILS # BLD AUTO: 6.74 X10(3)/MCL (ref 2.1–9.2)
NEUTROPHILS NFR BLD AUTO: 75.8 %
NRBC BLD AUTO-RTO: 0 %
PLATELET # BLD AUTO: 342 X10(3)/MCL (ref 130–400)
PMV BLD AUTO: 10.4 FL (ref 7.4–10.4)
POCT GLUCOSE: 151 MG/DL (ref 70–110)
POCT GLUCOSE: 249 MG/DL (ref 70–110)
POCT GLUCOSE: 262 MG/DL (ref 70–110)
POTASSIUM SERPL-SCNC: 4.3 MMOL/L (ref 3.5–5.1)
RBC # BLD AUTO: 4.44 X10(6)/MCL (ref 4.2–5.4)
SODIUM SERPL-SCNC: 133 MMOL/L (ref 136–145)
WBC # BLD AUTO: 8.9 X10(3)/MCL (ref 4.5–11.5)

## 2025-05-03 PROCEDURE — 25000003 PHARM REV CODE 250

## 2025-05-03 PROCEDURE — 82140 ASSAY OF AMMONIA: CPT | Performed by: PHYSICIAN ASSISTANT

## 2025-05-03 PROCEDURE — 63600175 PHARM REV CODE 636 W HCPCS

## 2025-05-03 PROCEDURE — 85025 COMPLETE CBC W/AUTO DIFF WBC: CPT | Performed by: PHYSICIAN ASSISTANT

## 2025-05-03 PROCEDURE — 80048 BASIC METABOLIC PNL TOTAL CA: CPT | Performed by: PHYSICIAN ASSISTANT

## 2025-05-03 PROCEDURE — 36415 COLL VENOUS BLD VENIPUNCTURE: CPT | Performed by: PHYSICIAN ASSISTANT

## 2025-05-03 PROCEDURE — 25000003 PHARM REV CODE 250: Performed by: STUDENT IN AN ORGANIZED HEALTH CARE EDUCATION/TRAINING PROGRAM

## 2025-05-03 PROCEDURE — 25000003 PHARM REV CODE 250: Performed by: PHYSICIAN ASSISTANT

## 2025-05-03 PROCEDURE — 63600175 PHARM REV CODE 636 W HCPCS: Performed by: STUDENT IN AN ORGANIZED HEALTH CARE EDUCATION/TRAINING PROGRAM

## 2025-05-03 PROCEDURE — 99024 POSTOP FOLLOW-UP VISIT: CPT | Mod: ,,, | Performed by: PHYSICIAN ASSISTANT

## 2025-05-03 PROCEDURE — 25000003 PHARM REV CODE 250: Performed by: THORACIC SURGERY (CARDIOTHORACIC VASCULAR SURGERY)

## 2025-05-03 PROCEDURE — 21400001 HC TELEMETRY ROOM

## 2025-05-03 RX ADMIN — GLIMEPIRIDE 2 MG: 1 TABLET ORAL at 07:05

## 2025-05-03 RX ADMIN — LOSARTAN POTASSIUM 100 MG: 50 TABLET, FILM COATED ORAL at 07:05

## 2025-05-03 RX ADMIN — ATORVASTATIN CALCIUM 40 MG: 40 TABLET, FILM COATED ORAL at 09:05

## 2025-05-03 RX ADMIN — PIOGLITAZONE 30 MG: 15 TABLET ORAL at 09:05

## 2025-05-03 RX ADMIN — HYDROCODONE BITARTRATE AND ACETAMINOPHEN 1 TABLET: 5; 325 TABLET ORAL at 04:05

## 2025-05-03 RX ADMIN — DOCUSATE SODIUM 100 MG: 100 CAPSULE, LIQUID FILLED ORAL at 09:05

## 2025-05-03 RX ADMIN — HYDROCODONE BITARTRATE AND ACETAMINOPHEN 1 TABLET: 5; 325 TABLET ORAL at 05:05

## 2025-05-03 RX ADMIN — INSULIN ASPART 6 UNITS: 100 INJECTION, SOLUTION INTRAVENOUS; SUBCUTANEOUS at 04:05

## 2025-05-03 RX ADMIN — DOCUSATE SODIUM 100 MG: 100 CAPSULE, LIQUID FILLED ORAL at 07:05

## 2025-05-03 RX ADMIN — ALPRAZOLAM 0.5 MG: 0.5 TABLET ORAL at 04:05

## 2025-05-03 RX ADMIN — INSULIN ASPART 6 UNITS: 100 INJECTION, SOLUTION INTRAVENOUS; SUBCUTANEOUS at 08:05

## 2025-05-03 RX ADMIN — GLIMEPIRIDE 2 MG: 1 TABLET ORAL at 09:05

## 2025-05-03 RX ADMIN — Medication 6 MG: at 07:05

## 2025-05-03 RX ADMIN — CLOPIDOGREL 75 MG: 75 TABLET ORAL at 09:05

## 2025-05-03 RX ADMIN — MIRTAZAPINE 15 MG: 15 TABLET, FILM COATED ORAL at 07:05

## 2025-05-03 RX ADMIN — FAMOTIDINE 20 MG: 20 TABLET, FILM COATED ORAL at 07:05

## 2025-05-03 RX ADMIN — INSULIN GLARGINE 20 UNITS: 100 INJECTION, SOLUTION SUBCUTANEOUS at 09:05

## 2025-05-03 RX ADMIN — NIFEDIPINE 60 MG: 60 TABLET, FILM COATED, EXTENDED RELEASE ORAL at 09:05

## 2025-05-03 RX ADMIN — FAMOTIDINE 20 MG: 20 TABLET, FILM COATED ORAL at 09:05

## 2025-05-03 RX ADMIN — CITALOPRAM HYDROBROMIDE 20 MG: 20 TABLET ORAL at 09:05

## 2025-05-03 NOTE — PROGRESS NOTES
Jocelyne Dickinson is a 60 y.o. female patient.   1. Nodule of apex of right lung      Past Medical History:   Diagnosis Date    Abdominal pain     resolved    Anxiety disorder, unspecified     Carotid artery stenosis     Cellulitis of scalp     resolved    COPD (chronic obstructive pulmonary disease)     Diabetes mellitus     Digestive disorder     acid reflux    Disorder of pancreas     Disorder of pancreatic duct     Emphysema, unspecified     Fatigue     Gastrointestinal tract imaging abnormality     HTN (hypertension)     Mixed hyperlipidemia     Neuropathy     Nodule of apex of right lung     Osteoarthritis     Pancreatitis     Personal history of nicotine dependence     Pneumonia, unspecified organism     Shortness of breath     Syncope     Weight loss      No past surgical history pertinent negatives on file.  Scheduled Meds:   atorvastatin  40 mg Oral Daily    citalopram  20 mg Oral Daily    clopidogreL  75 mg Oral Daily    docusate sodium  100 mg Oral BID    famotidine  20 mg Oral BID    glimepiride  2 mg Oral BID    insulin glargine U-100 (Lantus)  20 Units Subcutaneous Daily    loperamide  4 mg Oral Once    losartan  100 mg Oral QHS    mirtazapine  15 mg Oral QHS    NIFEdipine  60 mg Oral Daily    pioglitazone  30 mg Oral Daily     Continuous Infusions:  PRN Meds:  Current Facility-Administered Medications:     acetaminophen, 650 mg, Oral, Q4H PRN    albuterol, 2 puff, Inhalation, Q6H PRN    ALPRAZolam, 0.5 mg, Oral, BID PRN    dextrose 50%, 12.5 g, Intravenous, PRN    dextrose 50%, 25 g, Intravenous, PRN    diphenhydrAMINE, 25 mg, Oral, Q6H PRN    glucagon (human recombinant), 1 mg, Intramuscular, PRN    glucose, 16 g, Oral, PRN    glucose, 24 g, Oral, PRN    HYDROcodone-acetaminophen, 1 tablet, Oral, Q4H PRN    HYDROcodone-acetaminophen, 1 tablet, Oral, Q4H PRN    insulin aspart U-100, 0-15 Units, Subcutaneous, QID (AC + HS) PRN    melatonin, 6 mg, Oral, Nightly PRN    metoclopramide, 5 mg, Intravenous,  "Q6H PRN    ondansetron, 8 mg, Oral, Q8H PRN    polyethylene glycol, 17 g, Oral, BID PRN    Review of patient's allergies indicates:   Allergen Reactions    Aller ext-american cockroach Itching and Other (See Comments)    Allerg ext-tree poll-red maple Itching and Other (See Comments)    Cat hair standardized allergenic extract Itching and Other (See Comments)    Dog hair standardized allergenic extract Itching and Other (See Comments)    Grass pollen-ruslan, standard Itching and Other (See Comments)    Horse dander Itching and Other (See Comments)    Tree pollen-box elder Itching and Other (See Comments)    Tree pollen-pecan Itching and Other (See Comments)    Trulicity [dulaglutide] Other (See Comments)     Patient had pancreatitis       There are no hospital problems to display for this patient.    Blood pressure (!) 157/74, pulse 85, temperature 98 °F (36.7 °C), temperature source Oral, resp. rate 18, height 5' 5" (1.651 m), weight 66.7 kg (147 lb 0.8 oz), SpO2 97%, not currently breastfeeding.    Subjective:  POD9 s/p RUL. NAEON. VSS. Room air. Ct to suction, no Airleak.  In good sprits        Objective:   Up in bed  AAOx3  RRR  Ctab  Abd soft         Assesment/Plan:    Change Ct to ray seal - eval to DC 24   Daily CXR      The case and plan of care discussed with Adult Cardiothoracic Surgery attending, Dr. Lees.       Macario Lombardo PA-C  5/3/2025   "

## 2025-05-04 LAB — POCT GLUCOSE: 313 MG/DL (ref 70–110)

## 2025-05-04 PROCEDURE — 25000003 PHARM REV CODE 250: Performed by: STUDENT IN AN ORGANIZED HEALTH CARE EDUCATION/TRAINING PROGRAM

## 2025-05-04 PROCEDURE — 63600175 PHARM REV CODE 636 W HCPCS

## 2025-05-04 PROCEDURE — 99024 POSTOP FOLLOW-UP VISIT: CPT | Mod: ,,, | Performed by: PHYSICIAN ASSISTANT

## 2025-05-04 PROCEDURE — 25000003 PHARM REV CODE 250

## 2025-05-04 PROCEDURE — 25000003 PHARM REV CODE 250: Performed by: THORACIC SURGERY (CARDIOTHORACIC VASCULAR SURGERY)

## 2025-05-04 PROCEDURE — 21400001 HC TELEMETRY ROOM

## 2025-05-04 PROCEDURE — 25000003 PHARM REV CODE 250: Performed by: PHYSICIAN ASSISTANT

## 2025-05-04 PROCEDURE — 63600175 PHARM REV CODE 636 W HCPCS: Performed by: STUDENT IN AN ORGANIZED HEALTH CARE EDUCATION/TRAINING PROGRAM

## 2025-05-04 RX ADMIN — GLIMEPIRIDE 2 MG: 1 TABLET ORAL at 08:05

## 2025-05-04 RX ADMIN — MIRTAZAPINE 15 MG: 15 TABLET, FILM COATED ORAL at 08:05

## 2025-05-04 RX ADMIN — ALPRAZOLAM 0.5 MG: 0.5 TABLET ORAL at 08:05

## 2025-05-04 RX ADMIN — NIFEDIPINE 60 MG: 60 TABLET, FILM COATED, EXTENDED RELEASE ORAL at 08:05

## 2025-05-04 RX ADMIN — ALPRAZOLAM 0.5 MG: 0.5 TABLET ORAL at 05:05

## 2025-05-04 RX ADMIN — HYDROCODONE BITARTRATE AND ACETAMINOPHEN 1 TABLET: 5; 325 TABLET ORAL at 05:05

## 2025-05-04 RX ADMIN — DOCUSATE SODIUM 100 MG: 100 CAPSULE, LIQUID FILLED ORAL at 08:05

## 2025-05-04 RX ADMIN — HYDROCODONE BITARTRATE AND ACETAMINOPHEN 1 TABLET: 5; 325 TABLET ORAL at 12:05

## 2025-05-04 RX ADMIN — INSULIN ASPART 8 UNITS: 100 INJECTION, SOLUTION INTRAVENOUS; SUBCUTANEOUS at 09:05

## 2025-05-04 RX ADMIN — PIOGLITAZONE 30 MG: 15 TABLET ORAL at 08:05

## 2025-05-04 RX ADMIN — INSULIN GLARGINE 20 UNITS: 100 INJECTION, SOLUTION SUBCUTANEOUS at 09:05

## 2025-05-04 RX ADMIN — ATORVASTATIN CALCIUM 40 MG: 40 TABLET, FILM COATED ORAL at 08:05

## 2025-05-04 RX ADMIN — FAMOTIDINE 20 MG: 20 TABLET, FILM COATED ORAL at 08:05

## 2025-05-04 RX ADMIN — Medication 6 MG: at 09:05

## 2025-05-04 RX ADMIN — INSULIN ASPART 6 UNITS: 100 INJECTION, SOLUTION INTRAVENOUS; SUBCUTANEOUS at 06:05

## 2025-05-04 RX ADMIN — HYDROCODONE BITARTRATE AND ACETAMINOPHEN 1 TABLET: 5; 325 TABLET ORAL at 09:05

## 2025-05-04 RX ADMIN — CLOPIDOGREL 75 MG: 75 TABLET ORAL at 08:05

## 2025-05-04 RX ADMIN — CITALOPRAM HYDROBROMIDE 20 MG: 20 TABLET ORAL at 08:05

## 2025-05-04 RX ADMIN — LOSARTAN POTASSIUM 100 MG: 50 TABLET, FILM COATED ORAL at 08:05

## 2025-05-04 NOTE — PLAN OF CARE
Problem: Diabetes Comorbidity  Goal: Blood Glucose Level Within Targeted Range  Outcome: Progressing     Problem: Infection  Goal: Absence of Infection Signs and Symptoms  Outcome: Progressing     Problem: Wound  Goal: Optimal Coping  Outcome: Progressing  Goal: Optimal Functional Ability  Outcome: Progressing  Goal: Absence of Infection Signs and Symptoms  Outcome: Progressing  Goal: Improved Oral Intake  Outcome: Progressing  Goal: Optimal Pain Control and Function  Outcome: Progressing  Goal: Skin Health and Integrity  Outcome: Progressing  Goal: Optimal Wound Healing  Outcome: Progressing

## 2025-05-04 NOTE — PROGRESS NOTES
Jocelyne Dickinson is a 60 y.o. female patient.   1. Nodule of apex of right lung      Past Medical History:   Diagnosis Date    Abdominal pain     resolved    Anxiety disorder, unspecified     Carotid artery stenosis     Cellulitis of scalp     resolved    COPD (chronic obstructive pulmonary disease)     Diabetes mellitus     Digestive disorder     acid reflux    Disorder of pancreas     Disorder of pancreatic duct     Emphysema, unspecified     Fatigue     Gastrointestinal tract imaging abnormality     HTN (hypertension)     Mixed hyperlipidemia     Neuropathy     Nodule of apex of right lung     Osteoarthritis     Pancreatitis     Personal history of nicotine dependence     Pneumonia, unspecified organism     Shortness of breath     Syncope     Weight loss      No past surgical history pertinent negatives on file.  Scheduled Meds:   atorvastatin  40 mg Oral Daily    citalopram  20 mg Oral Daily    clopidogreL  75 mg Oral Daily    docusate sodium  100 mg Oral BID    famotidine  20 mg Oral BID    glimepiride  2 mg Oral BID    insulin glargine U-100 (Lantus)  20 Units Subcutaneous Daily    loperamide  4 mg Oral Once    losartan  100 mg Oral QHS    mirtazapine  15 mg Oral QHS    NIFEdipine  60 mg Oral Daily    pioglitazone  30 mg Oral Daily     Continuous Infusions:  PRN Meds:  Current Facility-Administered Medications:     acetaminophen, 650 mg, Oral, Q4H PRN    albuterol, 2 puff, Inhalation, Q6H PRN    ALPRAZolam, 0.5 mg, Oral, BID PRN    dextrose 50%, 12.5 g, Intravenous, PRN    dextrose 50%, 25 g, Intravenous, PRN    diphenhydrAMINE, 25 mg, Oral, Q6H PRN    glucagon (human recombinant), 1 mg, Intramuscular, PRN    glucose, 16 g, Oral, PRN    glucose, 24 g, Oral, PRN    HYDROcodone-acetaminophen, 1 tablet, Oral, Q4H PRN    HYDROcodone-acetaminophen, 1 tablet, Oral, Q4H PRN    insulin aspart U-100, 0-15 Units, Subcutaneous, QID (AC + HS) PRN    melatonin, 6 mg, Oral, Nightly PRN    metoclopramide, 5 mg, Intravenous,  "Q6H PRN    ondansetron, 8 mg, Oral, Q8H PRN    polyethylene glycol, 17 g, Oral, BID PRN    Review of patient's allergies indicates:   Allergen Reactions    Aller ext-american cockroach Itching and Other (See Comments)    Allerg ext-tree poll-red maple Itching and Other (See Comments)    Cat hair standardized allergenic extract Itching and Other (See Comments)    Dog hair standardized allergenic extract Itching and Other (See Comments)    Grass pollen-ruslan, standard Itching and Other (See Comments)    Horse dander Itching and Other (See Comments)    Tree pollen-box elder Itching and Other (See Comments)    Tree pollen-pecan Itching and Other (See Comments)    Trulicity [dulaglutide] Other (See Comments)     Patient had pancreatitis       There are no hospital problems to display for this patient.    Blood pressure (!) 181/87, pulse 92, temperature 97.8 °F (36.6 °C), temperature source Oral, resp. rate 20, height 5' 5" (1.651 m), weight 66.2 kg (145 lb 15.1 oz), SpO2 98%, not currently breastfeeding.    Subjective:  s/p RUL. NAEON. VSS. Room air.    Objective:   Up in bed  AAOx3  RRR  Ctab  Abd soft         Results Release     PACS Images for Drill Map Viewer     Show images for X-Ray Chest 1 View  X-Ray Chest 1 View  Order: [id number]   Status: Final result       Next appt: 05/12/2025 at 10:00 AM in Family Medicine (Edi Roldan DO)    Test Result Released: Yes (not seen)    0 Result Notes  Details    Reading Physician Reading Date Result Priority   Jaswinder Wellington MD  [phone]  5/4/2025 Routine     Narrative & Impression  EXAMINATION:  XR CHEST 1 VIEW     CLINICAL HISTORY:  Chest tube;     TECHNIQUE:  Frontal view(s) of the chest.     COMPARISON:  Radiography 05/03/2025     FINDINGS:  Stable small right pneumothorax.  No significant change in overall lung aeration.     Impression:     Stable right pneumothorax.        Electronically signed by:Jaswinder Wellington  Date:                                  "           05/04/2025  Time:                                           08:29        Assesment/Plan:      NAD  CT is to water seal - fluctuates, small air leak   The remaining lobe is fully expanded on CXR   Will clamp the drain now and monitor closely before removal   mobilize  Daily CXR   DC planning     The case and plan of care discussed with Adult Cardiothoracic Surgery attending, Dr. Lees.       Macario Lombardo PA-C  5/4/2025

## 2025-05-05 LAB
POCT GLUCOSE: 156 MG/DL (ref 70–110)
POCT GLUCOSE: 215 MG/DL (ref 70–110)

## 2025-05-05 PROCEDURE — 25000003 PHARM REV CODE 250: Performed by: STUDENT IN AN ORGANIZED HEALTH CARE EDUCATION/TRAINING PROGRAM

## 2025-05-05 PROCEDURE — 25000003 PHARM REV CODE 250

## 2025-05-05 PROCEDURE — 63600175 PHARM REV CODE 636 W HCPCS: Performed by: STUDENT IN AN ORGANIZED HEALTH CARE EDUCATION/TRAINING PROGRAM

## 2025-05-05 PROCEDURE — 21400001 HC TELEMETRY ROOM

## 2025-05-05 PROCEDURE — 25000003 PHARM REV CODE 250: Performed by: PHYSICIAN ASSISTANT

## 2025-05-05 PROCEDURE — 63600175 PHARM REV CODE 636 W HCPCS

## 2025-05-05 PROCEDURE — 25000003 PHARM REV CODE 250: Performed by: THORACIC SURGERY (CARDIOTHORACIC VASCULAR SURGERY)

## 2025-05-05 RX ADMIN — DOCUSATE SODIUM 100 MG: 100 CAPSULE, LIQUID FILLED ORAL at 08:05

## 2025-05-05 RX ADMIN — NIFEDIPINE 60 MG: 60 TABLET, FILM COATED, EXTENDED RELEASE ORAL at 08:05

## 2025-05-05 RX ADMIN — LOSARTAN POTASSIUM 100 MG: 50 TABLET, FILM COATED ORAL at 08:05

## 2025-05-05 RX ADMIN — HYDROCODONE BITARTRATE AND ACETAMINOPHEN 1 TABLET: 10; 325 TABLET ORAL at 03:05

## 2025-05-05 RX ADMIN — HYDROCODONE BITARTRATE AND ACETAMINOPHEN 1 TABLET: 10; 325 TABLET ORAL at 04:05

## 2025-05-05 RX ADMIN — HYDROCODONE BITARTRATE AND ACETAMINOPHEN 1 TABLET: 10; 325 TABLET ORAL at 09:05

## 2025-05-05 RX ADMIN — INSULIN ASPART 3 UNITS: 100 INJECTION, SOLUTION INTRAVENOUS; SUBCUTANEOUS at 05:05

## 2025-05-05 RX ADMIN — FAMOTIDINE 20 MG: 20 TABLET, FILM COATED ORAL at 08:05

## 2025-05-05 RX ADMIN — HYDROCODONE BITARTRATE AND ACETAMINOPHEN 1 TABLET: 5; 325 TABLET ORAL at 08:05

## 2025-05-05 RX ADMIN — INSULIN GLARGINE 20 UNITS: 100 INJECTION, SOLUTION SUBCUTANEOUS at 08:05

## 2025-05-05 RX ADMIN — PIOGLITAZONE 30 MG: 15 TABLET ORAL at 08:05

## 2025-05-05 RX ADMIN — CITALOPRAM HYDROBROMIDE 20 MG: 20 TABLET ORAL at 08:05

## 2025-05-05 RX ADMIN — GLIMEPIRIDE 2 MG: 1 TABLET ORAL at 08:05

## 2025-05-05 RX ADMIN — INSULIN ASPART 6 UNITS: 100 INJECTION, SOLUTION INTRAVENOUS; SUBCUTANEOUS at 04:05

## 2025-05-05 RX ADMIN — MIRTAZAPINE 15 MG: 15 TABLET, FILM COATED ORAL at 08:05

## 2025-05-05 RX ADMIN — CLOPIDOGREL 75 MG: 75 TABLET ORAL at 08:05

## 2025-05-05 RX ADMIN — ATORVASTATIN CALCIUM 40 MG: 40 TABLET, FILM COATED ORAL at 08:05

## 2025-05-05 NOTE — PROGRESS NOTES
Jocelyne Dickinson is a 60 y.o. female patient.   1. Nodule of apex of right lung      Past Medical History:   Diagnosis Date    Abdominal pain     resolved    Anxiety disorder, unspecified     Carotid artery stenosis     Cellulitis of scalp     resolved    COPD (chronic obstructive pulmonary disease)     Diabetes mellitus     Digestive disorder     acid reflux    Disorder of pancreas     Disorder of pancreatic duct     Emphysema, unspecified     Fatigue     Gastrointestinal tract imaging abnormality     HTN (hypertension)     Mixed hyperlipidemia     Neuropathy     Nodule of apex of right lung     Osteoarthritis     Pancreatitis     Personal history of nicotine dependence     Pneumonia, unspecified organism     Shortness of breath     Syncope     Weight loss      No past surgical history pertinent negatives on file.  Scheduled Meds:   atorvastatin  40 mg Oral Daily    citalopram  20 mg Oral Daily    clopidogreL  75 mg Oral Daily    docusate sodium  100 mg Oral BID    famotidine  20 mg Oral BID    glimepiride  2 mg Oral BID    insulin glargine U-100 (Lantus)  20 Units Subcutaneous Daily    loperamide  4 mg Oral Once    losartan  100 mg Oral QHS    mirtazapine  15 mg Oral QHS    NIFEdipine  60 mg Oral Daily    pioglitazone  30 mg Oral Daily     Continuous Infusions:  PRN Meds:  Current Facility-Administered Medications:     acetaminophen, 650 mg, Oral, Q4H PRN    albuterol, 2 puff, Inhalation, Q6H PRN    ALPRAZolam, 0.5 mg, Oral, BID PRN    dextrose 50%, 12.5 g, Intravenous, PRN    dextrose 50%, 25 g, Intravenous, PRN    diphenhydrAMINE, 25 mg, Oral, Q6H PRN    glucagon (human recombinant), 1 mg, Intramuscular, PRN    glucose, 16 g, Oral, PRN    glucose, 24 g, Oral, PRN    HYDROcodone-acetaminophen, 1 tablet, Oral, Q4H PRN    HYDROcodone-acetaminophen, 1 tablet, Oral, Q4H PRN    insulin aspart U-100, 0-15 Units, Subcutaneous, QID (AC + HS) PRN    melatonin, 6 mg, Oral, Nightly PRN    metoclopramide, 5 mg, Intravenous,  "Q6H PRN    ondansetron, 8 mg, Oral, Q8H PRN    polyethylene glycol, 17 g, Oral, BID PRN    Review of patient's allergies indicates:   Allergen Reactions    Aller ext-american cockroach Itching and Other (See Comments)    Allerg ext-tree poll-red maple Itching and Other (See Comments)    Cat hair standardized allergenic extract Itching and Other (See Comments)    Dog hair standardized allergenic extract Itching and Other (See Comments)    Grass pollen-ruslan, standard Itching and Other (See Comments)    Horse dander Itching and Other (See Comments)    Tree pollen-box elder Itching and Other (See Comments)    Tree pollen-pecan Itching and Other (See Comments)    Trulicity [dulaglutide] Other (See Comments)     Patient had pancreatitis       There are no hospital problems to display for this patient.    Blood pressure (!) 148/73, pulse 74, temperature 97.6 °F (36.4 °C), resp. rate 18, height 5' 5" (1.651 m), weight 69.4 kg (153 lb), SpO2 96%, not currently breastfeeding.    Subjective: Seen and  examined. Chest tube clamped, stable cxr, path pending, room air      Objective:  Up in bed   Aaox3  Rrr  Ctab  Abd soft  No subq emphysema      Assesment/Plan:    DC chest tube today  CXR at 1330 and in am  Path pending    The case and plan of care discussed with Adult Cardiothoracic Surgery attending, Dr. Lees.         NINA Celis  5/5/2025   "

## 2025-05-06 VITALS
HEART RATE: 75 BPM | TEMPERATURE: 98 F | DIASTOLIC BLOOD PRESSURE: 69 MMHG | WEIGHT: 144.38 LBS | OXYGEN SATURATION: 97 % | SYSTOLIC BLOOD PRESSURE: 158 MMHG | RESPIRATION RATE: 18 BRPM | HEIGHT: 65 IN | BODY MASS INDEX: 24.06 KG/M2

## 2025-05-06 PROBLEM — Z72.0 TOBACCO USER: Status: ACTIVE | Noted: 2025-05-06

## 2025-05-06 PROBLEM — R91.1 SOLITARY PULMONARY NODULE: Status: ACTIVE | Noted: 2025-05-06

## 2025-05-06 LAB
POCT GLUCOSE: 124 MG/DL (ref 70–110)
POCT GLUCOSE: 186 MG/DL (ref 70–110)
POCT GLUCOSE: 248 MG/DL (ref 70–110)
POCT GLUCOSE: 251 MG/DL (ref 70–110)
POCT GLUCOSE: 335 MG/DL (ref 70–110)
PSYCHE PATHOLOGY RESULT: NORMAL

## 2025-05-06 PROCEDURE — 25000003 PHARM REV CODE 250: Performed by: STUDENT IN AN ORGANIZED HEALTH CARE EDUCATION/TRAINING PROGRAM

## 2025-05-06 PROCEDURE — 25000003 PHARM REV CODE 250

## 2025-05-06 PROCEDURE — 63600175 PHARM REV CODE 636 W HCPCS

## 2025-05-06 RX ORDER — HYDROCODONE BITARTRATE AND ACETAMINOPHEN 5; 325 MG/1; MG/1
1 TABLET ORAL EVERY 4 HOURS PRN
Qty: 28 TABLET | Refills: 0 | Status: SHIPPED | OUTPATIENT
Start: 2025-05-06 | End: 2025-05-12 | Stop reason: SDUPTHER

## 2025-05-06 RX ADMIN — DOCUSATE SODIUM 100 MG: 100 CAPSULE, LIQUID FILLED ORAL at 08:05

## 2025-05-06 RX ADMIN — NIFEDIPINE 60 MG: 60 TABLET, FILM COATED, EXTENDED RELEASE ORAL at 08:05

## 2025-05-06 RX ADMIN — INSULIN GLARGINE 20 UNITS: 100 INJECTION, SOLUTION SUBCUTANEOUS at 08:05

## 2025-05-06 RX ADMIN — GLIMEPIRIDE 2 MG: 1 TABLET ORAL at 08:05

## 2025-05-06 RX ADMIN — HYDROCODONE BITARTRATE AND ACETAMINOPHEN 1 TABLET: 5; 325 TABLET ORAL at 08:05

## 2025-05-06 RX ADMIN — ATORVASTATIN CALCIUM 40 MG: 40 TABLET, FILM COATED ORAL at 08:05

## 2025-05-06 RX ADMIN — CITALOPRAM HYDROBROMIDE 20 MG: 20 TABLET ORAL at 08:05

## 2025-05-06 RX ADMIN — HYDROCODONE BITARTRATE AND ACETAMINOPHEN 1 TABLET: 5; 325 TABLET ORAL at 02:05

## 2025-05-06 RX ADMIN — PIOGLITAZONE 30 MG: 15 TABLET ORAL at 08:05

## 2025-05-06 RX ADMIN — FAMOTIDINE 20 MG: 20 TABLET, FILM COATED ORAL at 08:05

## 2025-05-06 RX ADMIN — CLOPIDOGREL 75 MG: 75 TABLET ORAL at 08:05

## 2025-05-06 NOTE — DISCHARGE SUMMARY
[facility] 6th Floor Medical Telemetry  Discharge Note  Short Stay    Procedure(s) (LRB):  VATS (VIDEO-ASSISTED THORACOSCOPIC SURGERY) (Right)  THORACOTOMY (Right)      OUTCOME: Patient tolerated treatment/procedure well without complication and is now ready for discharge.    DISPOSITION: Home or Self Care    FINAL DIAGNOSIS:  Nodule of apex of right lung    FOLLOWUP: In clinic    DISCHARGE INSTRUCTIONS:    Discharge Procedure Orders   HOME HEALTH ORDERS   Order Comments: Eval and Treat All Services     Order Specific Question Answer Comments   What Home Health Agency is the patient currently using? Other/External         TIME SPENT ON DISCHARGE: 46 minutes

## 2025-05-06 NOTE — HOSPITAL COURSE
Mrs. Dickinson underwent RUL via thoracotomy with Dr. Lees. Patient tolerated procedure well and taken to PACU for recovery. She has not required supplemental oxygen since POD1. On POD5 she had 1 chest tube removed. Patient continued to have persistent airleak requiring clamping and water seal trial and finally had last chest tube dc'd on 5/5/25. On 5/6/25 patient deemed stable for discharge home with 2 week follow up. Pathology has not resulted in EMR.

## 2025-05-06 NOTE — PLAN OF CARE
05/06/25 1115   Final Note   Assessment Type Final Discharge Note   Anticipated Discharge Disposition Home-Health   Post-Acute Status   Post-Acute Authorization Home Health     Discharge documentation sent to [facility] via [email or web address]. Notified Dolores of discharge.

## 2025-05-06 NOTE — PLAN OF CARE
05/06/25 0927   Discharge Reassessment   Assessment Type Discharge Planning Reassessment   Discharge Plan discussed with: Patient   Discharge Plan A Home Health   Discharge Plan B Home Health   DME Needed Upon Discharge  none   Post-Acute Status   Post-Acute Authorization Home Health     Patient is set up with [facility]. No DME needed.   Satisfactory

## 2025-05-07 ENCOUNTER — PATIENT OUTREACH (OUTPATIENT)
Dept: ADMINISTRATIVE | Facility: CLINIC | Age: 60
End: 2025-05-07
Payer: COMMERCIAL

## 2025-05-07 PROCEDURE — G0180 MD CERTIFICATION HHA PATIENT: HCPCS | Mod: ,,, | Performed by: THORACIC SURGERY (CARDIOTHORACIC VASCULAR SURGERY)

## 2025-05-07 NOTE — PROGRESS NOTES
C3 nurse spoke with Jocelyne Dickinson for a TCC post hospital discharge follow up call. The patient has a scheduled HOSFU appointment with Edi Roldan DO (Family Medicine) on 5/16/2025; @8:20 am and with Lewis Lees MD (Cardiothoracic Surgery) on 5/22/2025; @8:20 am.

## 2025-05-09 DIAGNOSIS — C34.91 SQUAMOUS CELL CARCINOMA OF RIGHT LUNG: Primary | ICD-10-CM

## 2025-05-12 ENCOUNTER — OFFICE VISIT (OUTPATIENT)
Dept: FAMILY MEDICINE | Facility: CLINIC | Age: 60
End: 2025-05-12
Payer: COMMERCIAL

## 2025-05-12 ENCOUNTER — TELEPHONE (OUTPATIENT)
Dept: CARDIAC SURGERY | Facility: CLINIC | Age: 60
End: 2025-05-12
Payer: COMMERCIAL

## 2025-05-12 VITALS
DIASTOLIC BLOOD PRESSURE: 74 MMHG | RESPIRATION RATE: 18 BRPM | HEART RATE: 85 BPM | OXYGEN SATURATION: 96 % | WEIGHT: 148.63 LBS | HEIGHT: 65 IN | TEMPERATURE: 97 F | BODY MASS INDEX: 24.76 KG/M2 | SYSTOLIC BLOOD PRESSURE: 142 MMHG

## 2025-05-12 DIAGNOSIS — Z90.2 S/P PARTIAL LOBECTOMY OF LUNG: Primary | ICD-10-CM

## 2025-05-12 DIAGNOSIS — R91.1 NODULE OF APEX OF RIGHT LUNG: ICD-10-CM

## 2025-05-12 PROCEDURE — 3046F HEMOGLOBIN A1C LEVEL >9.0%: CPT | Mod: CPTII,,, | Performed by: FAMILY MEDICINE

## 2025-05-12 PROCEDURE — 99496 TRANSJ CARE MGMT HIGH F2F 7D: CPT | Mod: ,,, | Performed by: FAMILY MEDICINE

## 2025-05-12 PROCEDURE — 1159F MED LIST DOCD IN RCRD: CPT | Mod: CPTII,,, | Performed by: FAMILY MEDICINE

## 2025-05-12 PROCEDURE — 1111F DSCHRG MED/CURRENT MED MERGE: CPT | Mod: CPTII,,, | Performed by: FAMILY MEDICINE

## 2025-05-12 PROCEDURE — 3077F SYST BP >= 140 MM HG: CPT | Mod: CPTII,,, | Performed by: FAMILY MEDICINE

## 2025-05-12 PROCEDURE — 1160F RVW MEDS BY RX/DR IN RCRD: CPT | Mod: CPTII,,, | Performed by: FAMILY MEDICINE

## 2025-05-12 PROCEDURE — 3078F DIAST BP <80 MM HG: CPT | Mod: CPTII,,, | Performed by: FAMILY MEDICINE

## 2025-05-12 PROCEDURE — 4010F ACE/ARB THERAPY RXD/TAKEN: CPT | Mod: CPTII,,, | Performed by: FAMILY MEDICINE

## 2025-05-12 RX ORDER — HYDROCODONE BITARTRATE AND ACETAMINOPHEN 5; 325 MG/1; MG/1
1 TABLET ORAL EVERY 6 HOURS PRN
Qty: 28 TABLET | Refills: 0 | Status: SHIPPED | OUTPATIENT
Start: 2025-05-12 | End: 2025-05-12 | Stop reason: SDUPTHER

## 2025-05-12 RX ORDER — HYDROCODONE BITARTRATE AND ACETAMINOPHEN 5; 325 MG/1; MG/1
1 TABLET ORAL EVERY 6 HOURS PRN
Qty: 28 TABLET | Refills: 0 | Status: SHIPPED | OUTPATIENT
Start: 2025-05-12

## 2025-05-12 NOTE — TELEPHONE ENCOUNTER
Patient called to inform prescription for norco was refilled. Patient also inquired if she could  to her appt on May 22. Advised patient she should not drive until she sees there physician on her next visit.

## 2025-05-12 NOTE — PROGRESS NOTES
Transitional Care Note  Subjective:         Patient ID: Jocelyne Dickinson is a 60 y.o. female.  Chief Complaint: Transitional Care (Admitted 4/23--5/6. VAT S surgery. )    Family and/or Caretaker present at visit?  Yes.  Diagnostic tests reviewed/disposition: No diagnosic tests pending after this hospitalization.  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   Home health/community services discussion/referrals: Patient has home health established at [facility]..   Establishment or re-establishment of referral orders for community resources: No other necessary community resources.   Discussion with other health care providers: No discussion with other health care providers necessary.   S/P Right upper lobectomy for neuroendocrine and squamous cell carcinoma. On room air, doing well. Discharged 5/6/25. Some continued pain midaxillary line on right side. Incision healing well.       Review of Systems    Objective:      Physical Exam  Vitals reviewed.   Constitutional:       General: She is not in acute distress.     Appearance: Normal appearance.   Cardiovascular:      Rate and Rhythm: Normal rate and regular rhythm.      Heart sounds: No murmur heard.     No friction rub. No gallop.   Pulmonary:      Effort: No respiratory distress.      Breath sounds: No wheezing, rhonchi or rales.   Musculoskeletal:         General: No swelling, tenderness or deformity.      Right lower leg: No edema.      Left lower leg: No edema.   Skin:     General: Skin is warm and dry.      Findings: No lesion or rash.   Neurological:      General: No focal deficit present.      Mental Status: She is alert.   Psychiatric:         Mood and Affect: Mood normal.         Assessment:       1. S/P partial lobectomy of lung        Plan:         1. S/P partial lobectomy of lung  -Doing well post-op. Recommended patient wear mask in public. Keep upcoming appointment with surgeon.      Current Medications[1]                 [1]   Current Outpatient Medications:     albuterol (PROVENTIL/VENTOLIN HFA) 90 mcg/actuation inhaler, inhale TWO puffs EVERY 4 TO 6 HOURS AS NEEDED FOR wheezing, Disp: 6.7 g, Rfl: 6    ALPRAZolam (XANAX) 0.5 MG tablet, Take 1 tablet (0.5 mg total) by mouth 2 (two) times daily., Disp: 60 tablet, Rfl: 5    atorvastatin (LIPITOR) 40 MG tablet, Take 40 mg by mouth once daily., Disp: , Rfl:     azelastine (ASTELIN) 137 mcg (0.1 %) nasal spray, SMARTSI-2 Spray(s) Both Nares Twice Daily, Disp: , Rfl:     blood-glucose meter,continuous (DEXCOM G7 ) Misc, 1 each by Misc.(Non-Drug; Combo Route) route continuous., Disp: 1 each, Rfl: 0    blood-glucose sensor (DEXCOM G7 SENSOR) Kristen, 1 each by Misc.(Non-Drug; Combo Route) route every 10 days., Disp: 5 each, Rfl: 11    budesonide-formoterol 160-4.5 mcg (SYMBICORT) 160-4.5 mcg/actuation HFAA, INHALE TWO PUFFS TWICE A DAY, Disp: 10.2 g, Rfl: 6    citalopram (CELEXA) 20 MG tablet, TAKE ONE TABLET BY MOUTH EVERY DAY WITH 10 MG, Disp: 30 tablet, Rfl: 0    clopidogreL (PLAVIX) 75 mg tablet, TAKE ONE TABLET BY MOUTH DAILY, Disp: 30 tablet, Rfl: 4    esomeprazole (NEXIUM) 40 MG capsule, TAKE ONE CAPSULE BY MOUTH BEFORE breakfast, Disp: 30 capsule, Rfl: 9    gabapentin (NEURONTIN) 300 MG capsule, TAKE ONE CAPSULE BY MOUTH EVERY EVENING, Disp: 30 capsule, Rfl: 11    glimepiride (AMARYL) 2 MG tablet, TAKE ONE TABLET BY MOUTH TWICE DAILY FOR DIABETES, Disp: 180 tablet, Rfl: 5    HYDROcodone-acetaminophen (NORCO) 5-325 mg per tablet, Take 1 tablet by mouth every 4 (four) hours as needed for Pain., Disp: 28 tablet, Rfl: 0    hydrocortisone 2.5 % cream, SMARTSIG:sparingly Topical 3 Times Daily, Disp: , Rfl:     hydrOXYzine pamoate (VISTARIL) 25 MG Cap, TAKE ONE CAPSULE BY MOUTH DAILY, Disp: 30 capsule, Rfl: 5    insulin aspart U-100 (NOVOLOG FLEXPEN U-100 INSULIN) 100 unit/mL (3 mL) InPn pen, Inject 4 Units into the skin 3 (three) times  "daily with meals., Disp: 3.6 mL, Rfl: 2    insulin glargine U-100, Lantus, (LANTUS SOLOSTAR U-100 INSULIN) 100 unit/mL (3 mL) InPn pen, Inject 20 Units into the skin once daily., Disp: 6 mL, Rfl: 11    losartan (COZAAR) 100 MG tablet, TAKE 1 TABLET BY MOUTH DAILY FOR BLOOD PRESSURE, Disp: 30 tablet, Rfl: 5    mirtazapine (REMERON) 15 MG tablet, TAKE 1/2 TABLET BY MOUTH DAILY EVERY EVENING, Disp: 15 tablet, Rfl: 11    NIFEdipine (PROCARDIA-XL) 60 MG (OSM) 24 hr tablet, TAKE ONE TABLET BY MOUTH DAILY FOR BLOOD PRESSURE, Disp: 30 tablet, Rfl: 5    pen needle, diabetic 31 gauge x 3/16" Ndle, 1 Pen by Misc.(Non-Drug; Combo Route) route Daily., Disp: 100 each, Rfl: 1    pioglitazone (ACTOS) 30 MG tablet, TAKE ONE TABLET BY MOUTH DAILY, Disp: 30 tablet, Rfl: 2    SPIRIVA WITH HANDIHALER 18 mcg inhalation capsule, Inhale 1 capsule into the lungs Daily., Disp: , Rfl:     sucralfate (CARAFATE) 100 mg/mL suspension, Take 10 mLs (1 g total) by mouth as needed., Disp: 414 mL, Rfl: 6    TRADJENTA 5 mg Tab tablet, TAKE ONE TABLET BY MOUTH DAILY, Disp: 30 tablet, Rfl: 5    "

## 2025-05-12 NOTE — TELEPHONE ENCOUNTER
----- Message from Varinder sent at 5/12/2025 11:17 AM CDT -----  Contact: [phone]  REFILL ON [address] [place]

## 2025-05-15 ENCOUNTER — TELEPHONE (OUTPATIENT)
Dept: PHARMACY | Facility: CLINIC | Age: 60
End: 2025-05-15
Payer: COMMERCIAL

## 2025-05-15 NOTE — TELEPHONE ENCOUNTER
[facility]Population [facility] Chart Review & Patient Outreach Details For Medication Adherence Project    Reason for Outreach Encounter: 3rd Party payor non-compliance report ([employer], [employer], C, etc)  2.  Patient Outreach Method: Reviewed patient chart   3.   Medication in question:    Hypertension Medications              losartan (COZAAR) 100 MG tablet TAKE 1 TABLET BY MOUTH DAILY FOR BLOOD PRESSURE    NIFEdipine (PROCARDIA-XL) 60 MG (OSM) 24 hr tablet TAKE ONE TABLET BY MOUTH DAILY FOR BLOOD PRESSURE                 Losartan 100mg  last filled  5/9/25 for 30 day supply    4.  Reviewed and or Updates Made To: Patient Chart  5. Outreach Outcomes and/or actions taken: Patient filled medication and is on track to be adherent  Additional Notes:

## 2025-05-19 DIAGNOSIS — Z90.2 STATUS POST PARTIAL LOBECTOMY OF LUNG: Primary | ICD-10-CM

## 2025-05-21 ENCOUNTER — TELEPHONE (OUTPATIENT)
Dept: FAMILY MEDICINE | Facility: CLINIC | Age: 60
End: 2025-05-21

## 2025-05-21 ENCOUNTER — HOSPITAL ENCOUNTER (OUTPATIENT)
Dept: RADIOLOGY | Facility: HOSPITAL | Age: 60
Discharge: HOME | End: 2025-05-21
Attending: THORACIC SURGERY (CARDIOTHORACIC VASCULAR SURGERY)
Payer: COMMERCIAL

## 2025-05-21 DIAGNOSIS — Z90.2 STATUS POST PARTIAL LOBECTOMY OF LUNG: ICD-10-CM

## 2025-05-21 DIAGNOSIS — E11.65 TYPE 2 DIABETES MELLITUS WITH HYPERGLYCEMIA, WITHOUT LONG-TERM CURRENT USE OF INSULIN: ICD-10-CM

## 2025-05-21 PROCEDURE — 71046 X-RAY EXAM CHEST 2 VIEWS: CPT | Mod: TC

## 2025-05-21 RX ORDER — INSULIN GLARGINE 100 [IU]/ML
25 INJECTION, SOLUTION SUBCUTANEOUS DAILY
Qty: 7.5 ML | Refills: 11 | Status: SHIPPED | OUTPATIENT
Start: 2025-05-21 | End: 2025-06-03 | Stop reason: SDUPTHER

## 2025-05-21 NOTE — TELEPHONE ENCOUNTER
Will increase Lantus to 25 units daily. Please have patient go obtain her fasting labs as soon as possible and schedule her for follow up.

## 2025-05-22 ENCOUNTER — OFFICE VISIT (OUTPATIENT)
Dept: CARDIAC SURGERY | Facility: CLINIC | Age: 60
End: 2025-05-22
Payer: COMMERCIAL

## 2025-05-22 VITALS
SYSTOLIC BLOOD PRESSURE: 126 MMHG | WEIGHT: 142 LBS | BODY MASS INDEX: 23.66 KG/M2 | OXYGEN SATURATION: 96 % | DIASTOLIC BLOOD PRESSURE: 72 MMHG | HEIGHT: 65 IN | HEART RATE: 76 BPM

## 2025-05-22 DIAGNOSIS — C34.91 SQUAMOUS CELL LUNG CANCER, RIGHT: ICD-10-CM

## 2025-05-22 DIAGNOSIS — R91.1 NODULE OF APEX OF RIGHT LUNG: ICD-10-CM

## 2025-05-22 DIAGNOSIS — E11.65 TYPE 2 DIABETES MELLITUS WITH HYPERGLYCEMIA, WITHOUT LONG-TERM CURRENT USE OF INSULIN: ICD-10-CM

## 2025-05-22 DIAGNOSIS — G89.18 POST-OPERATIVE PAIN: Primary | ICD-10-CM

## 2025-05-22 DIAGNOSIS — Z90.2 STATUS POST PARTIAL LOBECTOMY OF LUNG: Primary | ICD-10-CM

## 2025-05-22 PROCEDURE — 3046F HEMOGLOBIN A1C LEVEL >9.0%: CPT | Mod: CPTII,,, | Performed by: THORACIC SURGERY (CARDIOTHORACIC VASCULAR SURGERY)

## 2025-05-22 PROCEDURE — 3008F BODY MASS INDEX DOCD: CPT | Mod: CPTII,,, | Performed by: THORACIC SURGERY (CARDIOTHORACIC VASCULAR SURGERY)

## 2025-05-22 PROCEDURE — 4010F ACE/ARB THERAPY RXD/TAKEN: CPT | Mod: CPTII,,, | Performed by: THORACIC SURGERY (CARDIOTHORACIC VASCULAR SURGERY)

## 2025-05-22 PROCEDURE — 1159F MED LIST DOCD IN RCRD: CPT | Mod: CPTII,,, | Performed by: THORACIC SURGERY (CARDIOTHORACIC VASCULAR SURGERY)

## 2025-05-22 PROCEDURE — 3078F DIAST BP <80 MM HG: CPT | Mod: CPTII,,, | Performed by: THORACIC SURGERY (CARDIOTHORACIC VASCULAR SURGERY)

## 2025-05-22 PROCEDURE — 3074F SYST BP LT 130 MM HG: CPT | Mod: CPTII,,, | Performed by: THORACIC SURGERY (CARDIOTHORACIC VASCULAR SURGERY)

## 2025-05-22 PROCEDURE — 1111F DSCHRG MED/CURRENT MED MERGE: CPT | Mod: CPTII,,, | Performed by: THORACIC SURGERY (CARDIOTHORACIC VASCULAR SURGERY)

## 2025-05-22 PROCEDURE — 99024 POSTOP FOLLOW-UP VISIT: CPT | Mod: ,,, | Performed by: THORACIC SURGERY (CARDIOTHORACIC VASCULAR SURGERY)

## 2025-05-22 PROCEDURE — 1160F RVW MEDS BY RX/DR IN RCRD: CPT | Mod: CPTII,,, | Performed by: THORACIC SURGERY (CARDIOTHORACIC VASCULAR SURGERY)

## 2025-05-22 RX ORDER — HYDROCODONE BITARTRATE AND ACETAMINOPHEN 5; 325 MG/1; MG/1
1 TABLET ORAL EVERY 6 HOURS PRN
Qty: 28 TABLET | Refills: 0 | Status: SHIPPED | OUTPATIENT
Start: 2025-05-22

## 2025-05-29 NOTE — PROGRESS NOTES
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     She is  doing well without complaints   Vital signs stable/afebrile   Regular rate and rhythm without murmurs rubs or gallops  Coarse breath sounds bilaterally   Wounds CDI   Echocardiogram reviewed   A/P:  Doing well without complaints.  Plan per Oncology

## 2025-06-02 ENCOUNTER — LAB VISIT (OUTPATIENT)
Dept: LAB | Facility: HOSPITAL | Age: 60
End: 2025-06-02
Attending: FAMILY MEDICINE
Payer: COMMERCIAL

## 2025-06-02 DIAGNOSIS — Z79.4 TYPE 2 DIABETES MELLITUS WITH HYPERGLYCEMIA, WITH LONG-TERM CURRENT USE OF INSULIN: ICD-10-CM

## 2025-06-02 DIAGNOSIS — E11.65 TYPE 2 DIABETES MELLITUS WITH HYPERGLYCEMIA, WITH LONG-TERM CURRENT USE OF INSULIN: ICD-10-CM

## 2025-06-02 DIAGNOSIS — E11.65 TYPE 2 DIABETES MELLITUS WITH HYPERGLYCEMIA, WITHOUT LONG-TERM CURRENT USE OF INSULIN: ICD-10-CM

## 2025-06-02 LAB
ALBUMIN SERPL-MCNC: 3.2 G/DL (ref 3.5–5)
ALBUMIN/GLOB SERPL: 1.1 RATIO (ref 1.1–2)
ALP SERPL-CCNC: 82 UNIT/L (ref 40–150)
ALT SERPL-CCNC: 9 UNIT/L (ref 0–55)
ANION GAP SERPL CALC-SCNC: 8 MEQ/L
AST SERPL-CCNC: 10 UNIT/L (ref 11–45)
BILIRUB SERPL-MCNC: 0.3 MG/DL
BUN SERPL-MCNC: 15 MG/DL (ref 9.8–20.1)
CALCIUM SERPL-MCNC: 8.8 MG/DL (ref 8.4–10.2)
CHLORIDE SERPL-SCNC: 112 MMOL/L (ref 98–107)
CHOLEST SERPL-MCNC: 143 MG/DL
CHOLEST/HDLC SERPL: 3 {RATIO} (ref 0–5)
CO2 SERPL-SCNC: 25 MMOL/L (ref 22–29)
CREAT SERPL-MCNC: 0.79 MG/DL (ref 0.55–1.02)
CREAT/UREA NIT SERPL: 19
EST. AVERAGE GLUCOSE BLD GHB EST-MCNC: 185.8 MG/DL
GFR SERPLBLD CREATININE-BSD FMLA CKD-EPI: >60 ML/MIN/1.73/M2
GLOBULIN SER-MCNC: 2.8 GM/DL (ref 2.4–3.5)
GLUCOSE SERPL-MCNC: 141 MG/DL (ref 74–100)
HBA1C MFR BLD: 8.1 %
HDLC SERPL-MCNC: 47 MG/DL (ref 35–60)
LDLC SERPL CALC-MCNC: 53 MG/DL (ref 50–140)
POTASSIUM SERPL-SCNC: 3.9 MMOL/L (ref 3.5–5.1)
PROT SERPL-MCNC: 6 GM/DL (ref 6.4–8.3)
SODIUM SERPL-SCNC: 145 MMOL/L (ref 136–145)
TRIGL SERPL-MCNC: 213 MG/DL (ref 37–140)
VLDLC SERPL CALC-MCNC: 43 MG/DL

## 2025-06-02 PROCEDURE — 80061 LIPID PANEL: CPT

## 2025-06-02 PROCEDURE — 80053 COMPREHEN METABOLIC PANEL: CPT

## 2025-06-02 PROCEDURE — 83036 HEMOGLOBIN GLYCOSYLATED A1C: CPT

## 2025-06-02 PROCEDURE — 36415 COLL VENOUS BLD VENIPUNCTURE: CPT

## 2025-06-03 ENCOUNTER — OFFICE VISIT (OUTPATIENT)
Facility: CLINIC | Age: 60
End: 2025-06-03
Payer: COMMERCIAL

## 2025-06-03 ENCOUNTER — OFFICE VISIT (OUTPATIENT)
Dept: FAMILY MEDICINE | Facility: CLINIC | Age: 60
End: 2025-06-03
Payer: COMMERCIAL

## 2025-06-03 VITALS
SYSTOLIC BLOOD PRESSURE: 122 MMHG | HEIGHT: 65 IN | DIASTOLIC BLOOD PRESSURE: 69 MMHG | BODY MASS INDEX: 24.29 KG/M2 | WEIGHT: 145.81 LBS | HEART RATE: 76 BPM | RESPIRATION RATE: 18 BRPM | OXYGEN SATURATION: 98 % | TEMPERATURE: 98 F

## 2025-06-03 VITALS
WEIGHT: 145.88 LBS | TEMPERATURE: 98 F | SYSTOLIC BLOOD PRESSURE: 148 MMHG | RESPIRATION RATE: 16 BRPM | DIASTOLIC BLOOD PRESSURE: 60 MMHG | OXYGEN SATURATION: 99 % | HEIGHT: 65 IN | HEART RATE: 63 BPM | BODY MASS INDEX: 24.3 KG/M2

## 2025-06-03 DIAGNOSIS — C7A.1 LARGE CELL NEUROENDOCRINE CARCINOMA OF RIGHT MAIN BRONCHUS: Primary | ICD-10-CM

## 2025-06-03 DIAGNOSIS — C7A.1 LARGE CELL NEUROENDOCRINE CARCINOMA: ICD-10-CM

## 2025-06-03 DIAGNOSIS — E11.65 TYPE 2 DIABETES MELLITUS WITH HYPERGLYCEMIA, WITH LONG-TERM CURRENT USE OF INSULIN: ICD-10-CM

## 2025-06-03 DIAGNOSIS — C34.90 MALIGNANT NEOPLASM OF UNSPECIFIED PART OF UNSPECIFIED BRONCHUS OR LUNG: Primary | ICD-10-CM

## 2025-06-03 DIAGNOSIS — Z79.4 TYPE 2 DIABETES MELLITUS WITH HYPERGLYCEMIA, WITH LONG-TERM CURRENT USE OF INSULIN: ICD-10-CM

## 2025-06-03 DIAGNOSIS — C34.91 SQUAMOUS CELL LUNG CANCER, RIGHT: ICD-10-CM

## 2025-06-03 DIAGNOSIS — K59.03 DRUG-INDUCED CONSTIPATION: Primary | ICD-10-CM

## 2025-06-03 DIAGNOSIS — Z90.2 STATUS POST PARTIAL LOBECTOMY OF LUNG: ICD-10-CM

## 2025-06-03 PROCEDURE — 1159F MED LIST DOCD IN RCRD: CPT | Mod: CPTII,S$GLB,, | Performed by: INTERNAL MEDICINE

## 2025-06-03 PROCEDURE — 3008F BODY MASS INDEX DOCD: CPT | Mod: CPTII,S$GLB,, | Performed by: INTERNAL MEDICINE

## 2025-06-03 PROCEDURE — 99999 PR PBB SHADOW E&M-EST. PATIENT-LVL IV: CPT | Mod: PBBFAC,,, | Performed by: INTERNAL MEDICINE

## 2025-06-03 PROCEDURE — 1111F DSCHRG MED/CURRENT MED MERGE: CPT | Mod: CPTII,S$GLB,, | Performed by: INTERNAL MEDICINE

## 2025-06-03 PROCEDURE — 3077F SYST BP >= 140 MM HG: CPT | Mod: CPTII,S$GLB,, | Performed by: INTERNAL MEDICINE

## 2025-06-03 PROCEDURE — 1160F RVW MEDS BY RX/DR IN RCRD: CPT | Mod: CPTII,S$GLB,, | Performed by: INTERNAL MEDICINE

## 2025-06-03 PROCEDURE — 4010F ACE/ARB THERAPY RXD/TAKEN: CPT | Mod: CPTII,S$GLB,, | Performed by: INTERNAL MEDICINE

## 2025-06-03 PROCEDURE — 3078F DIAST BP <80 MM HG: CPT | Mod: CPTII,S$GLB,, | Performed by: INTERNAL MEDICINE

## 2025-06-03 PROCEDURE — 99205 OFFICE O/P NEW HI 60 MIN: CPT | Mod: S$GLB,,, | Performed by: INTERNAL MEDICINE

## 2025-06-03 PROCEDURE — 3052F HG A1C>EQUAL 8.0%<EQUAL 9.0%: CPT | Mod: CPTII,S$GLB,, | Performed by: INTERNAL MEDICINE

## 2025-06-03 RX ORDER — INSULIN GLARGINE 100 [IU]/ML
30 INJECTION, SOLUTION SUBCUTANEOUS DAILY
Qty: 9 ML | Refills: 11 | Status: SHIPPED | OUTPATIENT
Start: 2025-06-03 | End: 2026-06-03

## 2025-06-03 RX ORDER — INSULIN ASPART 100 [IU]/ML
6 INJECTION, SOLUTION INTRAVENOUS; SUBCUTANEOUS
Qty: 5.4 ML | Refills: 11 | Status: SHIPPED | OUTPATIENT
Start: 2025-06-03 | End: 2026-06-03

## 2025-06-03 RX ORDER — DOCUSATE SODIUM 100 MG/1
100 CAPSULE, LIQUID FILLED ORAL 2 TIMES DAILY
Qty: 60 CAPSULE | Refills: 5 | Status: SHIPPED | OUTPATIENT
Start: 2025-06-03 | End: 2025-11-30

## 2025-06-04 ENCOUNTER — PATIENT MESSAGE (OUTPATIENT)
Dept: HEMATOLOGY/ONCOLOGY | Facility: CLINIC | Age: 60
End: 2025-06-04
Payer: COMMERCIAL

## 2025-06-04 DIAGNOSIS — C7A.1 LARGE CELL NEUROENDOCRINE CARCINOMA: Primary | ICD-10-CM

## 2025-06-09 ENCOUNTER — CLINICAL SUPPORT (OUTPATIENT)
Dept: HEMATOLOGY/ONCOLOGY | Facility: CLINIC | Age: 60
End: 2025-06-09
Payer: COMMERCIAL

## 2025-06-09 ENCOUNTER — OFFICE VISIT (OUTPATIENT)
Dept: HEMATOLOGY/ONCOLOGY | Facility: CLINIC | Age: 60
End: 2025-06-09
Payer: COMMERCIAL

## 2025-06-09 VITALS
DIASTOLIC BLOOD PRESSURE: 72 MMHG | OXYGEN SATURATION: 98 % | WEIGHT: 146 LBS | HEIGHT: 65 IN | BODY MASS INDEX: 24.32 KG/M2 | HEART RATE: 70 BPM | SYSTOLIC BLOOD PRESSURE: 155 MMHG | TEMPERATURE: 98 F

## 2025-06-09 DIAGNOSIS — C7A.1 LARGE CELL NEUROENDOCRINE CARCINOMA OF RIGHT MAIN BRONCHUS: Primary | ICD-10-CM

## 2025-06-09 DIAGNOSIS — F41.9 ANXIETY AND DEPRESSION: ICD-10-CM

## 2025-06-09 DIAGNOSIS — F32.A ANXIETY AND DEPRESSION: ICD-10-CM

## 2025-06-09 DIAGNOSIS — C34.91 SQUAMOUS CELL LUNG CANCER, RIGHT: ICD-10-CM

## 2025-06-09 DIAGNOSIS — E11.9 TYPE 2 DIABETES MELLITUS WITHOUT COMPLICATIONS: ICD-10-CM

## 2025-06-09 PROCEDURE — 99999 PR PBB SHADOW E&M-EST. PATIENT-LVL I: CPT | Mod: PBBFAC,,,

## 2025-06-09 PROCEDURE — 4010F ACE/ARB THERAPY RXD/TAKEN: CPT | Mod: CPTII,S$GLB,,

## 2025-06-09 PROCEDURE — 99999 PR PBB SHADOW E&M-EST. PATIENT-LVL III: CPT | Mod: PBBFAC,,,

## 2025-06-09 PROCEDURE — 3078F DIAST BP <80 MM HG: CPT | Mod: CPTII,S$GLB,,

## 2025-06-09 PROCEDURE — 1159F MED LIST DOCD IN RCRD: CPT | Mod: CPTII,S$GLB,,

## 2025-06-09 PROCEDURE — 3052F HG A1C>EQUAL 8.0%<EQUAL 9.0%: CPT | Mod: CPTII,S$GLB,,

## 2025-06-09 PROCEDURE — 3008F BODY MASS INDEX DOCD: CPT | Mod: CPTII,S$GLB,,

## 2025-06-09 PROCEDURE — 3077F SYST BP >= 140 MM HG: CPT | Mod: CPTII,S$GLB,,

## 2025-06-09 PROCEDURE — 99215 OFFICE O/P EST HI 40 MIN: CPT | Mod: S$GLB,,,

## 2025-06-09 RX ORDER — CITALOPRAM 10 MG/1
TABLET ORAL
Qty: 30 TABLET | Refills: 5 | Status: SHIPPED | OUTPATIENT
Start: 2025-06-09

## 2025-06-09 RX ORDER — DEXAMETHASONE 4 MG/1
8 TABLET ORAL DAILY
Qty: 6 TABLET | Refills: 5 | Status: SHIPPED | OUTPATIENT
Start: 2025-06-16

## 2025-06-09 RX ORDER — LINAGLIPTIN 5 MG/1
5 TABLET, FILM COATED ORAL
Qty: 30 TABLET | Refills: 5 | Status: SHIPPED | OUTPATIENT
Start: 2025-06-09

## 2025-06-09 RX ORDER — PROCHLORPERAZINE MALEATE 5 MG
10 TABLET ORAL EVERY 6 HOURS PRN
Qty: 20 TABLET | Refills: 5 | Status: SHIPPED | OUTPATIENT
Start: 2025-06-16

## 2025-06-09 RX ORDER — OLANZAPINE 5 MG/1
5 TABLET, FILM COATED ORAL NIGHTLY
Qty: 4 TABLET | Refills: 5 | Status: SHIPPED | OUTPATIENT
Start: 2025-06-16

## 2025-06-09 NOTE — PROGRESS NOTES
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    Treatment/Oncology history:  --RUL Lobectomy w/ Mediastinal LND 4/23/25- Dr. Lewis Lees    Plan of care:     Imaging:  CT lung screening 06/28/2023 at [place]:  there is an 8.2 mm anterior pleural-based nodule in the right upper lobe.  5.7 mm semi-solid nodule in the posterior right upper lobe with surrounding interstitial prominence.  Focal scarring seen in the superior segment of the right lower lobe.  3.8 mm pleural-based nodule in the lateral aspect of the lingula.  Lung rads category 4A-suspicious abnormality.  Three-month follow-up low-dose CT recommended.  Alternatively PET-CT.  7/13/23 PET/CT @ [facility]: No PET evidence of metabolically active disease. Tiny left lower lobe nodule without abnormal uptake, however, it is too small to characterize by PET and should be monitor.  1/11/2024 CT Low Dose Lung Screening @ [facility]:Previously noted anterior right upper lobe spiculated nodule is again appreciated and is unchanged in size and appearance.  Previously seen groundglass nodule posterior right upper lobe is no longer identified.  Pleural-based nodule noted in the lingula is unchanged in size and appearance.  Lung rads category 2-benign appearance or behavior  1/16/25 CT Low Dose Lung Screening @ [facility]:  Significant changes in the appearance of the spiculated nodule in the anterior right upper lobe when compared with the 2 previous examinations.  No measures 10.3 than 19.3 mm with spiculation extending to the pleural surface anteriorly and medially.  Associated atelectasis in the anteromedial right upper lobe.  Previously seen juxtapleural nodule in the lingula is unchanged in size and appearance. Lung rads category 4A-suspicious abnormality.   PET-CT and/or biopsy considered    3/3/25 PET/CT Oncologic's: Mild uptake has developed in the right pulmonary hilum with SUV at 3.9, which compared to the uptake in the left pulmonary hilum with peak SUV of 2.6.  Uptake measured 1.3 cm.  Hypermetabolic spiculated nodule developing the anterior medially right upper lobe measuring 1.7 cm with SUV of 8.7, highly concerning for primary lung cancer.  Impression new hypermetabolic spiculated nodule in the anteromedial right upper lobe, concerning of malignancy.  New mild uptake in the right pulmonary hilum which is somewhat concerning and should be closely monitored.  No other suspicious uptake    Pathology:  3/21/25:  10R:  Negative for malignancy.  Cellular smear showing benign bronchial epithelial cells and lymphoid tissue admixed with hemosiderin laden macrophage.    4/23/25 RUL Lobectomy:  1. LYMPH NODES, 10R, EXCISION:    - THREE LYMPH NODES, NEGATIVE FOR METASTATIC CARCINOMA.   2. LUNG, RIGHT UPPER LOBE, LOBECTOMY:    - COMBINED LARGE CELL NEUROENDOCRINE AND SQUAMOUS CELL CARCINOMA,   - ONE HILAR LYMPH NODE, NEGATIVE FOR METASTATIC CARCINOMA.   3. LYMPH NODE 11R, EXCISION:    - ONE LYMPH NODE, NEGATIVE FOR METASTATIC CARCINOMA   G3, pT2a pN0  [facility]us/NGS- pending    CLINICAL HISTORY:      Patient: Jocelyne Dickinson is a 60 y.o. female with history of diabetes, COPD, hypertension, hyperlipidemia, smoker (now vaping) kindly referred for newly diagnosed non-small-cell lung carcinoma.    Patient was undergoing low-dose CT scan lung cancer screening and most recent showed significant changes in a spiculated nodule in the right upper lobe.  PET-CT was performed that showed activity in that area but no distant metastases.    Patient underwent VATS right thoracotomy on 4/23/2025 by Dr. Lewis Lees.  Pathology revealed combined large cell neuroendocrine and squamous cell carcinoma.  All lymph nodes were negative for metastatic disease with a pathologic staging of 1B  (T2aN0). Patient was presented at the multidisciplinary tumor board on 05/14/2025 and recommendations to repeat PET-CT or MRI and for adjuvant chemotherapy.  No role for radiation therapy.    Patient is here today and has been healing well from the surgery.  She does report some wheezing for about a with secondary to allergies.  No coughing.  No shortness of breath.  No chest pain.  No fever, chills, sweats.  She is here with her sister that has history of lung cancer.  Father had history of throat cancer.  They will also smoker.  Brother with renal cell carcinoma (no smoker).    Chief Complaint: Therapy Education       Interval History:    6/9/25: Ms. Dickinson presents to the clinic by herself for therapy education. Patient reports no major complaints at today's visit. Denies fever, chills, SOB, N/V/D, constipation, recent infection, chest pain or unexplained bleeding or bruising.        Past Medical History:   Diagnosis Date    Abdominal pain     resolved    Anxiety disorder, unspecified     Carotid artery stenosis     Cellulitis of scalp     resolved    COPD (chronic obstructive pulmonary disease)     Diabetes mellitus     Digestive disorder     acid reflux    Disorder of pancreas     Disorder of pancreatic duct     Emphysema, unspecified     Fatigue     Gastrointestinal tract imaging abnormality     HTN (hypertension)     Mixed hyperlipidemia     Neuropathy     Nodule of apex of right lung     Osteoarthritis     Pancreatitis     Personal history of nicotine dependence     Pneumonia, unspecified organism     Shortness of breath     Syncope     Weight loss       Past Surgical History:   Procedure Laterality Date    BRONCHOSCOPY  03/19/2025    [facility]-    CATARACT EXTRACTION      COLONOSCOPY  07/02/2021    Dr. Nila Sharma    EGD, WITH CLOSED BIOPSY  03/12/2024    Procedure: EGD, WITH CLOSED BIOPSY;  Surgeon: Diego Atkinson MD;  Location: [facility];  Service: Gastroenterology;;  CBD 5 mm, HOP  Cyst 4.5mm x 5 mm, Chronic Pancreatitis, PD Head 2.2 mm, PD Body 4.2 mm,    EYE SURGERY  2022    Cataract surgery on both eyes    REPAIR OF CAROTID ARTERY      THORACOTOMY Right 4/23/2025    Procedure: THORACOTOMY;  Surgeon: Lewis Lees MD;  Location: [facility] OR;  Service: Cardiovascular;  Laterality: Right;    TONSILLECTOMY  1969    I was 4 years old when tonsils were removed    ULTRASOUND, ENDOSCOPIC, WITH FINE NEEDLE ASPIRATION N/A 03/12/2024    Procedure: UPPER EUS W/ POSS FNA;  Surgeon: Diego Atkinson MD;  Location: [facility] OR;  Service: Gastroenterology;  Laterality: N/A;  MRI abd- Limited exam due to image quality. Overall pancreatic atrophy. MAin PD dilation to 9mm in the tail with dialtied side branches. Rerlative abrupt caliber change of main PD at body and tail junction.    VIDEO-ASSISTED THORACOSCOPIC SURGERY (VATS) Right 4/23/2025    Procedure: VATS (VIDEO-ASSISTED THORACOSCOPIC SURGERY);  Surgeon: Lewis Lees MD;  Location: [facility] OR;  Service: Cardiovascular;  Laterality: Right;  RIGHT VATS, RIGHT UPPER LOBECTOMY    WRIST SURGERY Bilateral      Family History   Problem Relation Name Age of Onset    Hypertension Mother      Hypertension Father Yordy Dartedenice     Heart disease Father Yordy Dartez     Cancer Father Yordy Markelltez     Stroke Father Yordy Dartez     Cancer Maternal Grandmother      Vision loss Maternal Grandmother      Cancer Sister Maribel Dickinson Arcement 50 - 59    Cancer Brother Moustapha Dickinson 60 - 69          Review of patient's allergies indicates:   Allergen Reactions    Aller ext-american cockroach Itching and Other (See Comments)    Allerg ext-tree poll-red maple Itching and Other (See Comments)    Cat hair standardized allergenic extract Itching and Other (See Comments)    Dog hair standardized allergenic extract Itching and Other (See Comments)    Grass pollen-ruslan, standard Itching and Other (See Comments)    Horse dander Itching and Other (See Comments)    Tree pollen-box  "elder Itching and Other (See Comments)    Tree pollen-pecan Itching and Other (See Comments)    Trulicity [dulaglutide] Other (See Comments)     Patient had pancreatitis        Medications Ordered Prior to Encounter[1]   Review of Systems   Constitutional:  Negative for activity change, appetite change, chills, fatigue, fever, night sweats and unexpected weight change.   HENT:  Negative for mouth dryness, mouth sores, nosebleeds, sore throat and trouble swallowing.    Eyes: Negative.  Negative for visual disturbance.   Respiratory:  Negative for cough and shortness of breath.    Cardiovascular:  Negative for chest pain, palpitations and leg swelling.   Gastrointestinal:  Negative for abdominal distention, abdominal pain, blood in stool, change in bowel habit, constipation, diarrhea, nausea and vomiting.   Endocrine: Negative.    Genitourinary:  Negative for dysuria, frequency, hematuria and urgency.   Musculoskeletal:  Negative for arthralgias, back pain, myalgias and neck pain.   Integumentary:  Negative for rash.   Neurological:  Negative for dizziness, tremors, syncope, speech difficulty, weakness, light-headedness, numbness, headaches and memory loss.   Hematological:  Negative for adenopathy. Does not bruise/bleed easily.   Psychiatric/Behavioral:  Negative for confusion and suicidal ideas. The patient is not nervous/anxious.        Vitals:    06/09/25 1342   BP: (!) 155/72   BP Location: Right arm   Patient Position: Sitting   Pulse: 70   Temp: 97.6 °F (36.4 °C)   TempSrc: Oral   SpO2: 98%   Weight: 66.2 kg (146 lb)   Height: 5' 5" (1.651 m)        Wt Readings from Last 6 Encounters:   06/03/25 66.2 kg (145 lb 14.4 oz)   06/03/25 66.1 kg (145 lb 12.8 oz)   05/22/25 64.4 kg (142 lb)   05/12/25 67.4 kg (148 lb 9.6 oz)   05/06/25 65.5 kg (144 lb 6.4 oz)   04/10/25 72.1 kg (159 lb)     Body mass index is 24.3 kg/m².  Body surface area is 1.74 meters squared.      Laboratory:  CBC with Differential:  Lab Results "   Component Value Date    WBC 8.90 05/03/2025    RBC 4.44 05/03/2025    HGB 12.7 05/03/2025    HCT 39.3 05/03/2025    MCV 88.5 05/03/2025    MCH 28.6 05/03/2025    MCHC 32.3 (L) 05/03/2025    RDW 13.7 05/03/2025     05/03/2025    MPV 10.4 05/03/2025        CMP:  Sodium   Date Value Ref Range Status   06/02/2025 145 136 - 145 mmol/L Final   04/21/2021 143 136 - 145 mmol/L Final     Potassium   Date Value Ref Range Status   06/02/2025 3.9 3.5 - 5.1 mmol/L Final   04/21/2021 4.5 3.5 - 5.1 mmol/L Final     Chloride   Date Value Ref Range Status   06/02/2025 112 (H) 98 - 107 mmol/L Final   04/21/2021 106 100 - 109 mmol/L Final     CO2   Date Value Ref Range Status   06/02/2025 25 22 - 29 mmol/L Final     Carbon Dioxide   Date Value Ref Range Status   04/21/2021 31 22 - 33 mmol/L Final     Glucose   Date Value Ref Range Status   06/02/2025 141 (H) 74 - 100 mg/dL Final     Blood Urea Nitrogen   Date Value Ref Range Status   06/02/2025 15.0 9.8 - 20.1 mg/dL Final   04/21/2021 10 7 - 18 mg/dL Final     Creatinine   Date Value Ref Range Status   06/02/2025 0.79 0.55 - 1.02 mg/dL Final   04/21/2021 0.90 0.57 - 1.25 mg/dL Final     Calcium   Date Value Ref Range Status   06/02/2025 8.8 8.4 - 10.2 mg/dL Final   04/21/2021 8.8 8.8 - 10.6 mg/dL Final     Protein Total   Date Value Ref Range Status   06/02/2025 6.0 (L) 6.4 - 8.3 gm/dL Final     Albumin   Date Value Ref Range Status   06/02/2025 3.2 (L) 3.5 - 5.0 g/dL Final     Bilirubin Total   Date Value Ref Range Status   06/02/2025 0.3 <=1.5 mg/dL Final     ALP   Date Value Ref Range Status   06/02/2025 82 40 - 150 unit/L Final     AST   Date Value Ref Range Status   06/02/2025 10 (L) 11 - 45 unit/L Final     ALT   Date Value Ref Range Status   06/02/2025 9 0 - 55 unit/L Final     Anion Gap   Date Value Ref Range Status   04/21/2021 6 (L) 8 - 16 mmol/L Final     eGFR    Date Value Ref Range Status   04/21/2021 79 >=60 mL/min/1.73mSq Final     Estimated  GFR-Non    Date Value Ref Range Status   04/30/2022 58               Assessment:   1) [id number] 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 Lewis Lees  --NCCN guidelines for stage IB, margins neg (R0): Observe vs adjuvant systemic chemotherapy  Plan:   -Therapy education completed on 6/9/25.  -PET/CT to complete staging awaiting to be scheduled.  - MRI Brain to complete staging scheduled for 6/10/25  -Plans to start Cisplatin/Etoposide q3w D1,2,3 x 4-6 cycles C1 on 6/18/25. Patient understood that she will receive premedications given 30 minutes prior to each treatment to help prevent nausea. Patient also aware that she will receive pre and post hydration. Per MD, repeat PET/CT after C2  -Mediport placement awaiting to be scheduled.  -Compazine PRN prescribed for at home with instructions to be taken by mouth every 6 hours as needed for nausea.   -OTC Imodium AD recommended for diarrhea (4-5 BMs a day). Take 2 tablets after the first loose bowel movement, and 1 tablet after each loose bowel movement after the first dose has been taken. No more than 4 tablets should be taken in any 24-hour period. If not working, Lomotil can be prescribed as 2nd choice.    -Mouth sore prevention with 1-quart warm water with 1 tsp of baking soda and salt and alcohol-free mouthwash.   -Emphasized adequate hand-hygiene and limited contact with people who are sick.   -Monitor and notify any bleeding in urine, stool, or sputum.  As well as unusual bleeding or bruising and stomach pain.   - Emphasized hydration with 4 16 oz bottles of water a day.    -Call clinic if fever >100.4, shakes or chills, shortness of breath, chest pain, uncontrolled vomiting or diarrhea, pain and tingling in the chest or arm, or just not feeling well.    -Plans to RTC on 6/17/25 via telemedicine with MD for scan results/labs    DISCUSSION:    1.  A total  of 60 minutes were spent in counseling today, in which 100% were face-to-face.  At today's therapy teaching session, we discussed the patient's cancer diagnosis as well as planned therapy regimen, protocol, side effects and toxicities.  A handout of each therapeutic agent in the regimen was provided and reviewed in detail.    2.  The following side effects were discussed but not limited to:    Cisplatin Side Effects:  Allergic Reactions  Anemia  Breathing Problems  Bruising  Chills  Cough  Feeling Faint  Fever  Infection  Injury  Itching  Mouth Sores  Nausea  Numbness  Pain  Rash  Swelling  Tingling  Vomiting    Etoposide (Injection) Side Effects:  Allergic Reactions  Anemia  Breathing Problems  Bruising  Chills  Cough  Diarrhea  Feeling Faint  Fever  Hair Loss  Infection  Itching  Low Blood Counts  Mouth Sores  Nausea  Pain  Rash  Swelling  Vomiting                a.  Discussed the risk of infection while on therapy related to pancytopenia, specifically a decrease in their white blood cell count.  Instructed to contact our office for temperature >100.4 F, chills, sudden onset cough or shortness of breath, symptoms of a urinary tract infection.                b.  Discussed the risk of anemia. Instructed to contact our office for dizziness, heart palpitations, or extreme or sudden changes in weakness.                c.  Discussed the risk of thrombocytopenia, which increases the risk of bruising or bleeding.  Instructed the patient to contact our office for spontaneous signs of bleeding, including nose bleeds, bleeding from the gums or mouth, blood in sputum, urine or stool and unusual or excessive bruising or rash.                d.  Discussed GI side effects including weight changes, changes in appetite, altered sense of taste, stomatitis, nausea, vomiting, diarrhea, constipation, and heartburn.                e.  Discussed  side effects including painful urination, changes in the amount of urination, possible  urine color changes.  Discussed fertility issues and to prevent  pregnancy if of child bearing age.                f.  Discussed neurological side effects including the risk of peripheral neuropathy, either temporary or permanent.                g.  Discussed the potential for skin, hair, and nail changes.       3.  Instructed to contact our office for discussion of medication changes, the addition of vitamin and/or herbal supplementation as they may interact with some chemotherapy agents.    4.  Discussed dietary modifications and the need to maintain adequate caloric intake and proper oral hydration.  Recommended 64 ounces of fluid per day.    5.  Discussed anti-emetic protocol and bowel regimen protocol.    6.  Office contact information given including after hours number.  Discussed there is an oncologist on call , 365 days including weekends.  Provided primary nurse's information .    7.  In summary, the patient is in agreement with the plan of care.  Questions appeared to be answered to their satisfaction. Consented the patient to the treatment plan and the patient was educated on the planned duration of the treatment and schedule of the treatment administration. Copy to be scanned into the chart.    All questions answered to the satisfaction of the patient and family.     Follow up appointments given to patient.       GARRETT BEAVER  PATIENT EDUCATOR  [facility]                                 [1]   Current Outpatient Medications on File Prior to Visit   Medication Sig Dispense Refill    albuterol (PROVENTIL/VENTOLIN HFA) 90 mcg/actuation inhaler inhale TWO puffs EVERY 4 TO 6 HOURS AS NEEDED FOR wheezing 6.7 g 6    ALPRAZolam (XANAX) 0.5 MG tablet Take 1 tablet (0.5 mg total) by mouth 2 (two) times daily. 60 tablet 5    atorvastatin (LIPITOR) 40 MG tablet Take 40 mg by mouth once daily.      azelastine (ASTELIN) 137 mcg (0.1 %) nasal spray SMARTSI-2 Spray(s) Both Nares Twice Daily       blood-glucose meter,continuous (DEXCOM G7 ) Misc 1 each by Misc.(Non-Drug; Combo Route) route continuous. 1 each 0    blood-glucose sensor (DEXCOM G7 SENSOR) Kristen 1 each by Misc.(Non-Drug; Combo Route) route every 10 days. 5 each 11    budesonide-formoterol 160-4.5 mcg (SYMBICORT) 160-4.5 mcg/actuation HFAA INHALE TWO PUFFS TWICE A DAY 10.2 g 6    citalopram (CELEXA) 20 MG tablet TAKE ONE TABLET BY MOUTH EVERY DAY WITH 10 MG 30 tablet 0    clopidogreL (PLAVIX) 75 mg tablet TAKE ONE TABLET BY MOUTH DAILY 30 tablet 4    docusate sodium (COLACE) 100 MG capsule Take 1 capsule (100 mg total) by mouth 2 (two) times daily. 60 capsule 5    esomeprazole (NEXIUM) 40 MG capsule TAKE ONE CAPSULE BY MOUTH BEFORE breakfast 30 capsule 9    gabapentin (NEURONTIN) 300 MG capsule TAKE ONE CAPSULE BY MOUTH EVERY EVENING 30 capsule 11    glimepiride (AMARYL) 2 MG tablet TAKE ONE TABLET BY MOUTH TWICE DAILY FOR DIABETES 180 tablet 5    HYDROcodone-acetaminophen (NORCO) 5-325 mg per tablet Take 1 tablet by mouth every 6 (six) hours as needed for Pain. 28 tablet 0    hydrocortisone 2.5 % cream SMARTSIG:sparingly Topical 3 Times Daily      hydrOXYzine pamoate (VISTARIL) 25 MG Cap TAKE ONE CAPSULE BY MOUTH DAILY 30 capsule 5    insulin aspart U-100 (NOVOLOG FLEXPEN U-100 INSULIN) 100 unit/mL (3 mL) InPn pen Inject 6 Units into the skin 3 (three) times daily with meals. 5.4 mL 11    insulin glargine U-100, Lantus, (LANTUS SOLOSTAR U-100 INSULIN) 100 unit/mL (3 mL) InPn pen Inject 30 Units into the skin once daily. 9 mL 11    linaCLOtide (LINZESS) 145 mcg Cap capsule Take 1 capsule (145 mcg total) by mouth before breakfast. 30 capsule 5    losartan (COZAAR) 100 MG tablet TAKE 1 TABLET BY MOUTH DAILY FOR BLOOD PRESSURE 30 tablet 5    mirtazapine (REMERON) 15 MG tablet TAKE 1/2 TABLET BY MOUTH DAILY EVERY EVENING 15 tablet 11    NIFEdipine (PROCARDIA-XL) 60 MG (OSM) 24 hr tablet TAKE ONE TABLET BY MOUTH DAILY FOR BLOOD PRESSURE 30  "tablet 5    pen needle, diabetic 31 gauge x 3/16" Ndle 1 Pen by Misc.(Non-Drug; Combo Route) route Daily. 100 each 1    pioglitazone (ACTOS) 30 MG tablet TAKE ONE TABLET BY MOUTH DAILY 30 tablet 2    SPIRIVA WITH HANDIHALER 18 mcg inhalation capsule Inhale 1 capsule into the lungs Daily.      sucralfate (CARAFATE) 100 mg/mL suspension Take 10 mLs (1 g total) by mouth as needed. 414 mL 6    [DISCONTINUED] TRADJENTA 5 mg Tab tablet TAKE ONE TABLET BY MOUTH DAILY 30 tablet 5     No current facility-administered medications on file prior to visit.     "

## 2025-06-09 NOTE — NURSING
Oncology Navigation   Intake  Cancer Type: -- (large cell neuroendocrine and squamous cell carcinoma)     Treatment     Surgery: Completed  Surgical Oncologist: Dr. Lewis Lees  Type of Surgery: VATS right thoracotomy  Surgery Schedule Date: 25    Medical Oncologist: Dr. Jany Powers  Consult Date: 25  Chemotherapy: Planned  Chemotherapy Regimen: OP EP (CISplatin etoposide) Q3W       Procedures: MRI; PET scan; Port / PICC  MRI Schedule Date: 25  Port / PICC Schedule Date: 25    General Referrals: Conrad Vickers; Financial Assistance          Support Systems: Children; Family members  Barriers of Care: Financial concerns  Financial Concerns: Financial hardship     Acuity  Stage: 1  Systemic Treatment - predicted or initiated: Chemotherapy Regimen with Multiple drugs (+1)  Treatment Tolerability: Has not started treatment yet/treatment fully completed and side effects resolved  ECO  Comorbidities in Medical History: 2  Hospitalization Within the Past Month: 0   Needed: 0  Support: 0  Verbalizes Financial Concerns: 1  Transportation: 0  Mental Health: PHQ Score: 0 (Distress 0/10)  Psychological Factors (+1 each): Emotional during conversation  History of noncompliance/frequent no shows and cancellations: 0  Verbalizes the need for more education: 0  Other Factors (+1 for Each): 0  Navigation Acuity: 8     Follow Up  2 weeks     Met with patient today following education visit. Introduced self and role of navigation. Assessed for barriers to care. She denies transportation concerns, need for DME, and need for home health. She does express anxiety but mostly around financial concerns. She is in the process of applying for disability in case she needs to stop working. I have referred her to [facility] and financial counseling. I will search for grants to send to her as well. Patient takes medication for anxiety. This is managed by her PCP. She expressed that she thinks her medications may  need to be changed. Encouraged her to reach out to PCP. We discussed meeting with LCSW. Patient does not wish to at this time. Patient has no further questions or concerns today. They are aware of how to reach navigation should they need to.

## 2025-06-10 ENCOUNTER — TELEPHONE (OUTPATIENT)
Dept: FAMILY MEDICINE | Facility: CLINIC | Age: 60
End: 2025-06-10
Payer: COMMERCIAL

## 2025-06-10 DIAGNOSIS — F41.9 ANXIETY AND DEPRESSION: ICD-10-CM

## 2025-06-10 DIAGNOSIS — F32.A ANXIETY AND DEPRESSION: ICD-10-CM

## 2025-06-10 RX ORDER — CITALOPRAM 20 MG/1
20 TABLET ORAL DAILY
Qty: 30 TABLET | Refills: 5 | Status: SHIPPED | OUTPATIENT
Start: 2025-06-10

## 2025-06-10 RX ORDER — INSULIN GLARGINE 100 [IU]/ML
30 INJECTION, SOLUTION SUBCUTANEOUS DAILY
COMMUNITY
Start: 2025-02-05

## 2025-06-10 NOTE — TELEPHONE ENCOUNTER
Copied from CRM #[id number]. Topic: Medications - Medication Refill  >> Hermes 10, 2025 10:26 AM Dannie wrote:  Who Called: Jocelyne Dickinson    Refill or New Rx:Refill  RX Name and Strength:citalopram (CELEXA) 20 MG tablet   How is the patient currently taking it? (ex. 1XDay):1x/day  Is this a 30 day or 90 day RX:30  Local or Mail Order:local  List of preferred pharmacies on file (remove unneeded): [facility] - [place], [place] - [address]   Phone: [phone]  Fax: [phone]        If different Pharmacy is requested, enter Pharmacy information here including location and phone number:    Ordering Provider:      Preferred Method of Contact: Phone Call  Patient's Preferred Phone Number on File: [phone]   Best Call Back Number, if different:  Additional Information: Patient is requesting a refill on medication. Also states she was advised by oncology to increase the dosage of ALPRAZolam (XANAX) 0.5 MG tablet.

## 2025-06-11 ENCOUNTER — HOSPITAL ENCOUNTER (OUTPATIENT)
Dept: RADIOLOGY | Facility: HOSPITAL | Age: 60
Discharge: HOME | End: 2025-06-11
Attending: INTERNAL MEDICINE
Payer: COMMERCIAL

## 2025-06-11 DIAGNOSIS — C7A.1 LARGE CELL NEUROENDOCRINE CARCINOMA: ICD-10-CM

## 2025-06-11 DIAGNOSIS — C34.90 MALIGNANT NEOPLASM OF UNSPECIFIED PART OF UNSPECIFIED BRONCHUS OR LUNG: ICD-10-CM

## 2025-06-11 DIAGNOSIS — C34.91 SQUAMOUS CELL LUNG CANCER, RIGHT: ICD-10-CM

## 2025-06-11 PROCEDURE — A9577 INJ MULTIHANCE: HCPCS | Performed by: INTERNAL MEDICINE

## 2025-06-11 PROCEDURE — 70553 MRI BRAIN STEM W/O & W/DYE: CPT | Mod: TC

## 2025-06-11 PROCEDURE — 25500020 PHARM REV CODE 255: Performed by: INTERNAL MEDICINE

## 2025-06-11 RX ORDER — IOPAMIDOL 755 MG/ML
100 INJECTION, SOLUTION INTRAVASCULAR
Status: DISCONTINUED | OUTPATIENT
Start: 2025-06-11 | End: 2025-06-11

## 2025-06-11 RX ADMIN — GADOBENATE DIMEGLUMINE 13 ML: 529 INJECTION, SOLUTION INTRAVENOUS at 08:06

## 2025-06-12 ENCOUNTER — OFFICE VISIT (OUTPATIENT)
Dept: CARDIAC SURGERY | Facility: CLINIC | Age: 60
End: 2025-06-12
Payer: COMMERCIAL

## 2025-06-12 ENCOUNTER — ANESTHESIA EVENT (OUTPATIENT)
Dept: SURGERY | Facility: HOSPITAL | Age: 60
End: 2025-06-12
Payer: COMMERCIAL

## 2025-06-12 VITALS
HEIGHT: 65 IN | HEART RATE: 68 BPM | SYSTOLIC BLOOD PRESSURE: 172 MMHG | BODY MASS INDEX: 23.82 KG/M2 | DIASTOLIC BLOOD PRESSURE: 82 MMHG | WEIGHT: 143 LBS

## 2025-06-12 DIAGNOSIS — C80.1 CARCINOMA: Primary | ICD-10-CM

## 2025-06-12 DIAGNOSIS — Z90.2 S/P PARTIAL LOBECTOMY OF LUNG: ICD-10-CM

## 2025-06-12 DIAGNOSIS — C34.91 SQUAMOUS CELL LUNG CANCER, RIGHT: Primary | ICD-10-CM

## 2025-06-12 DIAGNOSIS — C7A.1 LARGE CELL NEUROENDOCRINE CARCINOMA: ICD-10-CM

## 2025-06-12 DIAGNOSIS — C34.90 MALIGNANT NEOPLASM OF UNSPECIFIED PART OF UNSPECIFIED BRONCHUS OR LUNG: ICD-10-CM

## 2025-06-12 NOTE — PROGRESS NOTES
[facility]  Cardiothoracic Surgery  Consult Note    Patient Name: Jocelyne Dickinson  MRN: [id number]  Date of Service: 6/12/2025  Referring Provider: Jany Crum MD    Patient information was obtained from patient, past medical records, and primary team    Subjective:     Chief Complaint   Patient presents with    Pre-op Exam     ^REF'D DR. CRUM, DISCUSS MEDIPORT PLACEMENT. *on Plavix  PMH: R VATS, RT UPPER LOBECTOMY, MEDIASTINAL LN DX (4/23/2025).  CXR: 5/21/25  PATHOLOGY: FINAL DIAGNOSIS   1. LYMPH NODES: THREE LYMPH NODES, NEGATIVE FOR METS.   2. 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  ONE HILAR LYMPH NODE, NEGATIVE FOR METASTATIC CARCINOMA.   3. 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     She now needs MediPort access for chemotherapy    Current Outpatient Medications on File Prior to Visit   Medication Sig    albuterol (PROVENTIL/VENTOLIN HFA) 90 mcg/actuation inhaler inhale TWO puffs EVERY 4 TO 6 HOURS AS NEEDED FOR wheezing    ALPRAZolam (XANAX) 0.5 MG tablet Take 1 tablet (0.5 mg total) by mouth 2 (two) times daily.    atorvastatin (LIPITOR) 40 MG tablet Take 40 mg by mouth once daily.    azelastine (ASTELIN) 137 mcg (0.1 %) nasal spray daily as needed.    blood-glucose meter,continuous (DEXCOM G7 ) Misc 1 each by Misc.(Non-Drug; Combo Route) route continuous.    blood-glucose sensor (DEXCOM G7 SENSOR) Kristen 1 each by Misc.(Non-Drug; Combo Route) route every 10 days.    budesonide-formoterol 160-4.5 mcg (SYMBICORT) 160-4.5 mcg/actuation HFAA INHALE TWO PUFFS TWICE A DAY    citalopram (CELEXA) 10 MG tablet TAKE ONE TABLET BY MOUTH ONCE DAILY WITH 20 MG    citalopram (CELEXA) 20 MG tablet Take 1 tablet (20 mg total) by mouth once daily.     "clopidogreL (PLAVIX) 75 mg tablet TAKE ONE TABLET BY MOUTH DAILY    [START ON 6/16/2025] dexAMETHasone (DECADRON) 4 MG Tab Take 2 tablets (8 mg total) by mouth once daily. on days 2-4 of each chemotherapy cycle.    docusate sodium (COLACE) 100 MG capsule Take 1 capsule (100 mg total) by mouth 2 (two) times daily.    esomeprazole (NEXIUM) 40 MG capsule TAKE ONE CAPSULE BY MOUTH BEFORE breakfast    gabapentin (NEURONTIN) 300 MG capsule TAKE ONE CAPSULE BY MOUTH EVERY EVENING    glimepiride (AMARYL) 2 MG tablet TAKE ONE TABLET BY MOUTH TWICE DAILY FOR DIABETES    hydrocortisone 2.5 % cream SMARTSIG:sparingly Topical 3 Times Daily    hydrOXYzine pamoate (VISTARIL) 25 MG Cap TAKE ONE CAPSULE BY MOUTH DAILY    insulin aspart U-100 (NOVOLOG FLEXPEN U-100 INSULIN) 100 unit/mL (3 mL) InPn pen Inject 6 Units into the skin 3 (three) times daily with meals.    insulin glargine U-100, Lantus, (LANTUS SOLOSTAR U-100 INSULIN) 100 unit/mL (3 mL) InPn pen Inject 30 Units into the skin once daily.    linaCLOtide (LINZESS) 145 mcg Cap capsule Take 145 mcg by mouth before breakfast.    losartan (COZAAR) 100 MG tablet TAKE 1 TABLET BY MOUTH DAILY FOR BLOOD PRESSURE    mirtazapine (REMERON) 15 MG tablet TAKE 1/2 TABLET BY MOUTH DAILY EVERY EVENING    NIFEdipine (PROCARDIA-XL) 60 MG (OSM) 24 hr tablet TAKE ONE TABLET BY MOUTH DAILY FOR BLOOD PRESSURE    [START ON 6/16/2025] OLANZapine (ZYPREXA) 5 MG tablet Take 1 tablet (5 mg total) by mouth every evening. on days 1-4 of each chemotherapy cycle.    pen needle, diabetic 31 gauge x 3/16" Ndle 1 Pen by Misc.(Non-Drug; Combo Route) route Daily.    pioglitazone (ACTOS) 30 MG tablet TAKE ONE TABLET BY MOUTH DAILY    [START ON 6/16/2025] prochlorperazine (COMPAZINE) 5 MG tablet Take 2 tablets (10 mg total) by mouth every 6 (six) hours as needed for Nausea.    SPIRIVA WITH HANDIHALER 18 mcg inhalation capsule Inhale 1 capsule into the lungs Daily.    sucralfate (CARAFATE) 100 mg/mL suspension " Take 10 mLs (1 g total) by mouth as needed.    TRADJENTA 5 mg Tab tablet TAKE ONE TABLET BY MOUTH DAILY    [DISCONTINUED] HYDROcodone-acetaminophen (NORCO) 5-325 mg per tablet Take 1 tablet by mouth every 6 (six) hours as needed for Pain. (Patient not taking: Reported on 6/12/2025)     No current facility-administered medications on file prior to visit.       Review of patient's allergies indicates:   Allergen Reactions    Aller ext-american cockroach Itching and Other (See Comments)    Allerg ext-tree poll-red maple Itching and Other (See Comments)    Cat hair standardized allergenic extract Itching and Other (See Comments)    Dog hair standardized allergenic extract Itching and Other (See Comments)    Grass pollen-ruslan, standard Itching and Other (See Comments)    Horse dander Itching and Other (See Comments)    Tree pollen-box elder Itching and Other (See Comments)    Tree pollen-pecan Itching and Other (See Comments)    Trulicity [dulaglutide] Other (See Comments)     Patient had pancreatitis         Past Medical History:   Diagnosis Date    Abdominal pain     resolved    Anxiety disorder, unspecified     Carotid artery stenosis     Cellulitis of scalp     resolved    COPD (chronic obstructive pulmonary disease)     Diabetes mellitus     Digestive disorder     acid reflux    Disorder of pancreas     Disorder of pancreatic duct     Emphysema, unspecified     Fatigue     Gastrointestinal tract imaging abnormality     HTN (hypertension)     Mixed hyperlipidemia     Neuropathy     Nodule of apex of right lung     Osteoarthritis     Pancreatitis     Personal history of nicotine dependence     Pneumonia, unspecified organism     Shortness of breath     Syncope     Weight loss      Past Surgical History:   Procedure Laterality Date    BRONCHOSCOPY  03/19/2025    [facility]-    CATARACT EXTRACTION      COLONOSCOPY  07/02/2021    Dr. Nila Sharma    EGD, WITH CLOSED BIOPSY  03/12/2024     Procedure: EGD, WITH CLOSED BIOPSY;  Surgeon: Diego Atkinson MD;  Location: [facility] OR;  Service: Gastroenterology;;  CBD 5 mm, HOP Cyst 4.5mm x 5 mm, Chronic Pancreatitis, PD Head 2.2 mm, PD Body 4.2 mm,    EYE SURGERY      Cataract surgery on both eyes    REPAIR OF CAROTID ARTERY      THORACOTOMY Right 2025    Procedure: THORACOTOMY;  Surgeon: Lewis Lees MD;  Location: [facility] OR;  Service: Cardiovascular;  Laterality: Right;    TONSILLECTOMY  1969    I was 4 years old when tonsils were removed    ULTRASOUND, ENDOSCOPIC, WITH FINE NEEDLE ASPIRATION N/A 2024    Procedure: UPPER EUS W/ POSS FNA;  Surgeon: Diego Atkinson MD;  Location: [facility] OR;  Service: Gastroenterology;  Laterality: N/A;  MRI abd- Limited exam due to image quality. Overall pancreatic atrophy. MAin PD dilation to 9mm in the tail with dialtied side branches. Rerlative abrupt caliber change of main PD at body and tail junction.    VIDEO-ASSISTED THORACOSCOPIC SURGERY (VATS) Right 2025    Procedure: VATS (VIDEO-ASSISTED THORACOSCOPIC SURGERY);  Surgeon: Lewis Lees MD;  Location: [facility] OR;  Service: Cardiovascular;  Laterality: Right;  RIGHT VATS, RIGHT UPPER LOBECTOMY    WRIST SURGERY Bilateral      Family History       Problem Relation (Age of Onset)    Cancer Father, Maternal Grandmother, Sister (50 - 59), Brother (60 - 69)    Heart disease Father    Hypertension Mother, Father    Stroke Father    Vision loss Maternal Grandmother          Tobacco Use    Smoking status: Former     Types: Vaping with nicotine     Start date:      Quit date:      Years since quittin.4    Smokeless tobacco: Never    Tobacco comments:     Don't remember the dates   Substance and Sexual Activity    Alcohol use: Not Currently     Comment: pt has not drank in 2 years.    Drug use: Never    Sexual activity: Not Currently     Review of Systems   Constitutional: Negative. Negative for chills, diaphoresis, fever and night sweats.   HENT:   Negative for hoarse voice, sore throat and stridor.    Eyes: Negative.  Negative for blurred vision and double vision.   Cardiovascular:  Negative for chest pain, claudication, cyanosis, dyspnea on exertion, irregular heartbeat, leg swelling, orthopnea, palpitations, paroxysmal nocturnal dyspnea and syncope.   Respiratory:  Negative for cough, hemoptysis, shortness of breath, sputum production and wheezing.    Endocrine: Negative for polydipsia and polyuria.   Hematologic/Lymphatic: Negative for adenopathy and bleeding problem.   Gastrointestinal:  Negative for abdominal pain, diarrhea, heartburn, hematemesis, hematochezia, nausea and vomiting.   Neurological:  Negative for brief paralysis, disturbances in coordination, dizziness, focal weakness, headaches, numbness and paresthesias.     Objective:     Vitals:    06/12/25 0905   BP: (!) 172/82   Pulse: 68        Weight: 64.9 kg (143 lb)  Body mass index is 23.8 kg/m².     STS Risk Score:  Not applicable    Physical Exam  Vitals and nursing note reviewed.   Constitutional:       Appearance: Normal appearance.   HENT:      Head: Normocephalic and atraumatic.      Mouth/Throat:      Mouth: Mucous membranes are moist.   Eyes:      Extraocular Movements: Extraocular movements intact.      Conjunctiva/sclera: Conjunctivae normal.      Pupils: Pupils are equal, round, and reactive to light.   Neck:      Vascular: No carotid bruit.   Cardiovascular:      Rate and Rhythm: Normal rate and regular rhythm.      Pulses: Normal pulses.      Heart sounds: Normal heart sounds. No murmur heard.     No friction rub. No gallop.   Pulmonary:      Effort: Pulmonary effort is normal. No respiratory distress.      Breath sounds: Normal breath sounds. No wheezing, rhonchi or rales.   Abdominal:      General: Abdomen is flat. Bowel sounds are normal. There is no distension.      Palpations: Abdomen is soft. There is no mass.      Tenderness: There is no abdominal tenderness.    Musculoskeletal:      Cervical back: Normal range of motion and neck supple.   Lymphadenopathy:      Cervical: No cervical adenopathy.   Skin:     General: Skin is warm and dry.      Capillary Refill: Capillary refill takes less than 2 seconds.      Findings: No erythema or rash.   Neurological:      General: No focal deficit present.      Mental Status: She is alert and oriented to person, place, and time. Mental status is at baseline.          Significant Labs:  Reviewed    Significant Diagnostics:  Reviewed    Assessment/Plan:     Problem List Items Addressed This Visit       S/P partial lobectomy of lung    Plan right subclavian MediPort.  Risks, benefits, and alternatives have been discussed.  Questions have been answered.  The patient voices understanding and agrees to proceed.          Squamous cell lung cancer, right - Primary     Other Visit Diagnoses         Malignant neoplasm of unspecified part of unspecified bronchus or lung          Large cell neuroendocrine carcinoma                 Thank you for your consult. I will follow-up with patient. Please contact us if you have any additional questions.    AZAM Lees MD, FACC, FACS  Cardiothoracic Surgery  [facility]

## 2025-06-12 NOTE — ASSESSMENT & PLAN NOTE
Plan right subclavian MediPort.  Risks, benefits, and alternatives have been discussed.  Questions have been answered.  The patient voices understanding and agrees to proceed.

## 2025-06-12 NOTE — H&P (VIEW-ONLY)
[facility]  Cardiothoracic Surgery  Consult Note    Patient Name: Jocelyne Dickinson  MRN: [id number]  Date of Service: 6/12/2025  Referring Provider: Jany Crum MD    Patient information was obtained from patient, past medical records, and primary team    Subjective:     Chief Complaint   Patient presents with    Pre-op Exam     ^REF'D DR. CRUM, DISCUSS MEDIPORT PLACEMENT. *on Plavix  PMH: R VATS, RT UPPER LOBECTOMY, MEDIASTINAL LN DX (4/23/2025).  CXR: 5/21/25  PATHOLOGY: FINAL DIAGNOSIS   1. LYMPH NODES: THREE LYMPH NODES, NEGATIVE FOR METS.   2. 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  ONE HILAR LYMPH NODE, NEGATIVE FOR METASTATIC CARCINOMA.   3. 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     She now needs MediPort access for chemotherapy    Current Outpatient Medications on File Prior to Visit   Medication Sig    albuterol (PROVENTIL/VENTOLIN HFA) 90 mcg/actuation inhaler inhale TWO puffs EVERY 4 TO 6 HOURS AS NEEDED FOR wheezing    ALPRAZolam (XANAX) 0.5 MG tablet Take 1 tablet (0.5 mg total) by mouth 2 (two) times daily.    atorvastatin (LIPITOR) 40 MG tablet Take 40 mg by mouth once daily.    azelastine (ASTELIN) 137 mcg (0.1 %) nasal spray daily as needed.    blood-glucose meter,continuous (DEXCOM G7 ) Misc 1 each by Misc.(Non-Drug; Combo Route) route continuous.    blood-glucose sensor (DEXCOM G7 SENSOR) Kristen 1 each by Misc.(Non-Drug; Combo Route) route every 10 days.    budesonide-formoterol 160-4.5 mcg (SYMBICORT) 160-4.5 mcg/actuation HFAA INHALE TWO PUFFS TWICE A DAY    citalopram (CELEXA) 10 MG tablet TAKE ONE TABLET BY MOUTH ONCE DAILY WITH 20 MG    citalopram (CELEXA) 20 MG tablet Take 1 tablet (20 mg total) by mouth once daily.     "clopidogreL (PLAVIX) 75 mg tablet TAKE ONE TABLET BY MOUTH DAILY    [START ON 6/16/2025] dexAMETHasone (DECADRON) 4 MG Tab Take 2 tablets (8 mg total) by mouth once daily. on days 2-4 of each chemotherapy cycle.    docusate sodium (COLACE) 100 MG capsule Take 1 capsule (100 mg total) by mouth 2 (two) times daily.    esomeprazole (NEXIUM) 40 MG capsule TAKE ONE CAPSULE BY MOUTH BEFORE breakfast    gabapentin (NEURONTIN) 300 MG capsule TAKE ONE CAPSULE BY MOUTH EVERY EVENING    glimepiride (AMARYL) 2 MG tablet TAKE ONE TABLET BY MOUTH TWICE DAILY FOR DIABETES    hydrocortisone 2.5 % cream SMARTSIG:sparingly Topical 3 Times Daily    hydrOXYzine pamoate (VISTARIL) 25 MG Cap TAKE ONE CAPSULE BY MOUTH DAILY    insulin aspart U-100 (NOVOLOG FLEXPEN U-100 INSULIN) 100 unit/mL (3 mL) InPn pen Inject 6 Units into the skin 3 (three) times daily with meals.    insulin glargine U-100, Lantus, (LANTUS SOLOSTAR U-100 INSULIN) 100 unit/mL (3 mL) InPn pen Inject 30 Units into the skin once daily.    linaCLOtide (LINZESS) 145 mcg Cap capsule Take 145 mcg by mouth before breakfast.    losartan (COZAAR) 100 MG tablet TAKE 1 TABLET BY MOUTH DAILY FOR BLOOD PRESSURE    mirtazapine (REMERON) 15 MG tablet TAKE 1/2 TABLET BY MOUTH DAILY EVERY EVENING    NIFEdipine (PROCARDIA-XL) 60 MG (OSM) 24 hr tablet TAKE ONE TABLET BY MOUTH DAILY FOR BLOOD PRESSURE    [START ON 6/16/2025] OLANZapine (ZYPREXA) 5 MG tablet Take 1 tablet (5 mg total) by mouth every evening. on days 1-4 of each chemotherapy cycle.    pen needle, diabetic 31 gauge x 3/16" Ndle 1 Pen by Misc.(Non-Drug; Combo Route) route Daily.    pioglitazone (ACTOS) 30 MG tablet TAKE ONE TABLET BY MOUTH DAILY    [START ON 6/16/2025] prochlorperazine (COMPAZINE) 5 MG tablet Take 2 tablets (10 mg total) by mouth every 6 (six) hours as needed for Nausea.    SPIRIVA WITH HANDIHALER 18 mcg inhalation capsule Inhale 1 capsule into the lungs Daily.    sucralfate (CARAFATE) 100 mg/mL suspension " Take 10 mLs (1 g total) by mouth as needed.    TRADJENTA 5 mg Tab tablet TAKE ONE TABLET BY MOUTH DAILY    [DISCONTINUED] HYDROcodone-acetaminophen (NORCO) 5-325 mg per tablet Take 1 tablet by mouth every 6 (six) hours as needed for Pain. (Patient not taking: Reported on 6/12/2025)     No current facility-administered medications on file prior to visit.       Review of patient's allergies indicates:   Allergen Reactions    Aller ext-american cockroach Itching and Other (See Comments)    Allerg ext-tree poll-red maple Itching and Other (See Comments)    Cat hair standardized allergenic extract Itching and Other (See Comments)    Dog hair standardized allergenic extract Itching and Other (See Comments)    Grass pollen-ruslan, standard Itching and Other (See Comments)    Horse dander Itching and Other (See Comments)    Tree pollen-box elder Itching and Other (See Comments)    Tree pollen-pecan Itching and Other (See Comments)    Trulicity [dulaglutide] Other (See Comments)     Patient had pancreatitis         Past Medical History:   Diagnosis Date    Abdominal pain     resolved    Anxiety disorder, unspecified     Carotid artery stenosis     Cellulitis of scalp     resolved    COPD (chronic obstructive pulmonary disease)     Diabetes mellitus     Digestive disorder     acid reflux    Disorder of pancreas     Disorder of pancreatic duct     Emphysema, unspecified     Fatigue     Gastrointestinal tract imaging abnormality     HTN (hypertension)     Mixed hyperlipidemia     Neuropathy     Nodule of apex of right lung     Osteoarthritis     Pancreatitis     Personal history of nicotine dependence     Pneumonia, unspecified organism     Shortness of breath     Syncope     Weight loss      Past Surgical History:   Procedure Laterality Date    BRONCHOSCOPY  03/19/2025    [facility]-    CATARACT EXTRACTION      COLONOSCOPY  07/02/2021    Dr. Nila Sharma    EGD, WITH CLOSED BIOPSY  03/12/2024     Procedure: EGD, WITH CLOSED BIOPSY;  Surgeon: Diego Atkinson MD;  Location: [facility] OR;  Service: Gastroenterology;;  CBD 5 mm, HOP Cyst 4.5mm x 5 mm, Chronic Pancreatitis, PD Head 2.2 mm, PD Body 4.2 mm,    EYE SURGERY      Cataract surgery on both eyes    REPAIR OF CAROTID ARTERY      THORACOTOMY Right 2025    Procedure: THORACOTOMY;  Surgeon: Lewis Lees MD;  Location: [facility] OR;  Service: Cardiovascular;  Laterality: Right;    TONSILLECTOMY  1969    I was 4 years old when tonsils were removed    ULTRASOUND, ENDOSCOPIC, WITH FINE NEEDLE ASPIRATION N/A 2024    Procedure: UPPER EUS W/ POSS FNA;  Surgeon: Diego Atkinson MD;  Location: [facility] OR;  Service: Gastroenterology;  Laterality: N/A;  MRI abd- Limited exam due to image quality. Overall pancreatic atrophy. MAin PD dilation to 9mm in the tail with dialtied side branches. Rerlative abrupt caliber change of main PD at body and tail junction.    VIDEO-ASSISTED THORACOSCOPIC SURGERY (VATS) Right 2025    Procedure: VATS (VIDEO-ASSISTED THORACOSCOPIC SURGERY);  Surgeon: Lewis Lees MD;  Location: [facility] OR;  Service: Cardiovascular;  Laterality: Right;  RIGHT VATS, RIGHT UPPER LOBECTOMY    WRIST SURGERY Bilateral      Family History       Problem Relation (Age of Onset)    Cancer Father, Maternal Grandmother, Sister (50 - 59), Brother (60 - 69)    Heart disease Father    Hypertension Mother, Father    Stroke Father    Vision loss Maternal Grandmother          Tobacco Use    Smoking status: Former     Types: Vaping with nicotine     Start date:      Quit date:      Years since quittin.4    Smokeless tobacco: Never    Tobacco comments:     Don't remember the dates   Substance and Sexual Activity    Alcohol use: Not Currently     Comment: pt has not drank in 2 years.    Drug use: Never    Sexual activity: Not Currently     Review of Systems   Constitutional: Negative. Negative for chills, diaphoresis, fever and night sweats.   HENT:   Negative for hoarse voice, sore throat and stridor.    Eyes: Negative.  Negative for blurred vision and double vision.   Cardiovascular:  Negative for chest pain, claudication, cyanosis, dyspnea on exertion, irregular heartbeat, leg swelling, orthopnea, palpitations, paroxysmal nocturnal dyspnea and syncope.   Respiratory:  Negative for cough, hemoptysis, shortness of breath, sputum production and wheezing.    Endocrine: Negative for polydipsia and polyuria.   Hematologic/Lymphatic: Negative for adenopathy and bleeding problem.   Gastrointestinal:  Negative for abdominal pain, diarrhea, heartburn, hematemesis, hematochezia, nausea and vomiting.   Neurological:  Negative for brief paralysis, disturbances in coordination, dizziness, focal weakness, headaches, numbness and paresthesias.     Objective:     Vitals:    06/12/25 0905   BP: (!) 172/82   Pulse: 68        Weight: 64.9 kg (143 lb)  Body mass index is 23.8 kg/m².     STS Risk Score:  Not applicable    Physical Exam  Vitals and nursing note reviewed.   Constitutional:       Appearance: Normal appearance.   HENT:      Head: Normocephalic and atraumatic.      Mouth/Throat:      Mouth: Mucous membranes are moist.   Eyes:      Extraocular Movements: Extraocular movements intact.      Conjunctiva/sclera: Conjunctivae normal.      Pupils: Pupils are equal, round, and reactive to light.   Neck:      Vascular: No carotid bruit.   Cardiovascular:      Rate and Rhythm: Normal rate and regular rhythm.      Pulses: Normal pulses.      Heart sounds: Normal heart sounds. No murmur heard.     No friction rub. No gallop.   Pulmonary:      Effort: Pulmonary effort is normal. No respiratory distress.      Breath sounds: Normal breath sounds. No wheezing, rhonchi or rales.   Abdominal:      General: Abdomen is flat. Bowel sounds are normal. There is no distension.      Palpations: Abdomen is soft. There is no mass.      Tenderness: There is no abdominal tenderness.    Musculoskeletal:      Cervical back: Normal range of motion and neck supple.   Lymphadenopathy:      Cervical: No cervical adenopathy.   Skin:     General: Skin is warm and dry.      Capillary Refill: Capillary refill takes less than 2 seconds.      Findings: No erythema or rash.   Neurological:      General: No focal deficit present.      Mental Status: She is alert and oriented to person, place, and time. Mental status is at baseline.          Significant Labs:  Reviewed    Significant Diagnostics:  Reviewed    Assessment/Plan:     Problem List Items Addressed This Visit       S/P partial lobectomy of lung    Plan right subclavian MediPort.  Risks, benefits, and alternatives have been discussed.  Questions have been answered.  The patient voices understanding and agrees to proceed.          Squamous cell lung cancer, right - Primary     Other Visit Diagnoses         Malignant neoplasm of unspecified part of unspecified bronchus or lung          Large cell neuroendocrine carcinoma                 Thank you for your consult. I will follow-up with patient. Please contact us if you have any additional questions.    AZAM Lees MD, FACC, FACS  Cardiothoracic Surgery  [facility]

## 2025-06-16 ENCOUNTER — HOSPITAL ENCOUNTER (OUTPATIENT)
Dept: RADIOLOGY | Facility: HOSPITAL | Age: 60
Discharge: HOME | End: 2025-06-16
Attending: THORACIC SURGERY (CARDIOTHORACIC VASCULAR SURGERY)
Payer: COMMERCIAL

## 2025-06-16 DIAGNOSIS — C80.1 CARCINOMA: ICD-10-CM

## 2025-06-16 PROCEDURE — 71046 X-RAY EXAM CHEST 2 VIEWS: CPT | Mod: TC

## 2025-06-16 RX ORDER — IBUPROFEN 800 MG/1
800 TABLET, FILM COATED ORAL DAILY PRN
COMMUNITY

## 2025-06-16 RX ORDER — ACETAMINOPHEN 500 MG
1000 TABLET ORAL NIGHTLY PRN
COMMUNITY

## 2025-06-16 RX ORDER — MULTIVIT WITH MINERALS/HERBS
1 TABLET ORAL DAILY
COMMUNITY

## 2025-06-17 NOTE — CLINICAL REVIEW
Plavix last dose 6/12/25. labs/EKG/CXR reviewed. cardiology notes utd. Stress/Echo reviewed. chart review complete. ccv

## 2025-06-18 ENCOUNTER — HOSPITAL ENCOUNTER (OUTPATIENT)
Facility: HOSPITAL | Age: 60
Discharge: HOME | End: 2025-06-18
Attending: THORACIC SURGERY (CARDIOTHORACIC VASCULAR SURGERY) | Admitting: THORACIC SURGERY (CARDIOTHORACIC VASCULAR SURGERY)
Payer: COMMERCIAL

## 2025-06-18 ENCOUNTER — ANESTHESIA (OUTPATIENT)
Dept: SURGERY | Facility: HOSPITAL | Age: 60
End: 2025-06-18
Payer: COMMERCIAL

## 2025-06-18 VITALS
HEIGHT: 64 IN | WEIGHT: 142.88 LBS | SYSTOLIC BLOOD PRESSURE: 150 MMHG | RESPIRATION RATE: 19 BRPM | HEART RATE: 69 BPM | BODY MASS INDEX: 24.39 KG/M2 | TEMPERATURE: 97 F | OXYGEN SATURATION: 98 % | DIASTOLIC BLOOD PRESSURE: 90 MMHG

## 2025-06-18 DIAGNOSIS — C80.1 CARCINOMA: ICD-10-CM

## 2025-06-18 LAB — POCT GLUCOSE: 180 MG/DL (ref 70–110)

## 2025-06-18 PROCEDURE — 37000009 HC ANESTHESIA EA ADD 15 MINS: Performed by: THORACIC SURGERY (CARDIOTHORACIC VASCULAR SURGERY)

## 2025-06-18 PROCEDURE — 37000008 HC ANESTHESIA 1ST 15 MINUTES: Performed by: THORACIC SURGERY (CARDIOTHORACIC VASCULAR SURGERY)

## 2025-06-18 PROCEDURE — 71000015 HC POSTOP RECOV 1ST HR: Performed by: THORACIC SURGERY (CARDIOTHORACIC VASCULAR SURGERY)

## 2025-06-18 PROCEDURE — 63600175 PHARM REV CODE 636 W HCPCS: Performed by: NURSE ANESTHETIST, CERTIFIED REGISTERED

## 2025-06-18 PROCEDURE — 82962 GLUCOSE BLOOD TEST: CPT | Performed by: THORACIC SURGERY (CARDIOTHORACIC VASCULAR SURGERY)

## 2025-06-18 PROCEDURE — 63600175 PHARM REV CODE 636 W HCPCS: Performed by: PHYSICIAN ASSISTANT

## 2025-06-18 PROCEDURE — 36000707: Performed by: THORACIC SURGERY (CARDIOTHORACIC VASCULAR SURGERY)

## 2025-06-18 PROCEDURE — C1788 PORT, INDWELLING, IMP: HCPCS | Performed by: THORACIC SURGERY (CARDIOTHORACIC VASCULAR SURGERY)

## 2025-06-18 PROCEDURE — 36000706: Performed by: THORACIC SURGERY (CARDIOTHORACIC VASCULAR SURGERY)

## 2025-06-18 PROCEDURE — 63600175 PHARM REV CODE 636 W HCPCS: Performed by: THORACIC SURGERY (CARDIOTHORACIC VASCULAR SURGERY)

## 2025-06-18 PROCEDURE — 71000016 HC POSTOP RECOV ADDL HR: Performed by: THORACIC SURGERY (CARDIOTHORACIC VASCULAR SURGERY)

## 2025-06-18 PROCEDURE — 25000003 PHARM REV CODE 250: Performed by: NURSE ANESTHETIST, CERTIFIED REGISTERED

## 2025-06-18 DEVICE — POWERPORT SLIM IMPLANTABLE PORT WITH ATTACHABLE 6F CHRONOFLEX OPEN-ENDED SINGLE-LUMEN VENOUS CATHETER INTERMEDIATE KIT (WITH SUTURE PLUGS)
Type: IMPLANTABLE DEVICE | Site: CHEST  WALL | Status: FUNCTIONAL
Brand: POWERPORT, CHRONOFLEX

## 2025-06-18 RX ORDER — CEFUROXIME SODIUM 1.5 G/16ML
1.5 INJECTION, POWDER, FOR SOLUTION INTRAVENOUS
Status: COMPLETED | OUTPATIENT
Start: 2025-06-18 | End: 2025-06-18

## 2025-06-18 RX ORDER — MIDAZOLAM HYDROCHLORIDE 1 MG/ML
INJECTION INTRAMUSCULAR; INTRAVENOUS
Status: DISCONTINUED | OUTPATIENT
Start: 2025-06-18 | End: 2025-06-18

## 2025-06-18 RX ORDER — LIDOCAINE HYDROCHLORIDE 10 MG/ML
INJECTION, SOLUTION INFILTRATION; PERINEURAL
Status: DISCONTINUED | OUTPATIENT
Start: 2025-06-18 | End: 2025-06-18 | Stop reason: HOSPADM

## 2025-06-18 RX ORDER — PROPOFOL 10 MG/ML
VIAL (ML) INTRAVENOUS
Status: DISCONTINUED | OUTPATIENT
Start: 2025-06-18 | End: 2025-06-18

## 2025-06-18 RX ORDER — PROPOFOL 10 MG/ML
VIAL (ML) INTRAVENOUS CONTINUOUS PRN
Status: DISCONTINUED | OUTPATIENT
Start: 2025-06-18 | End: 2025-06-18

## 2025-06-18 RX ORDER — HEPARIN SOD,PORCINE/0.9 % NACL 1000/500ML
INTRAVENOUS SOLUTION INTRAVENOUS
Status: DISCONTINUED | OUTPATIENT
Start: 2025-06-18 | End: 2025-06-18 | Stop reason: HOSPADM

## 2025-06-18 RX ORDER — ONDANSETRON HYDROCHLORIDE 2 MG/ML
INJECTION, SOLUTION INTRAVENOUS
Status: DISCONTINUED | OUTPATIENT
Start: 2025-06-18 | End: 2025-06-18

## 2025-06-18 RX ORDER — MORPHINE SULFATE 4 MG/ML
2 INJECTION, SOLUTION INTRAMUSCULAR; INTRAVENOUS ONCE
Refills: 0 | Status: COMPLETED | OUTPATIENT
Start: 2025-06-18 | End: 2025-06-18

## 2025-06-18 RX ADMIN — ONDANSETRON 4 MG: 2 INJECTION INTRAMUSCULAR; INTRAVENOUS at 07:06

## 2025-06-18 RX ADMIN — MIDAZOLAM HYDROCHLORIDE 2 MG: 1 INJECTION, SOLUTION INTRAMUSCULAR; INTRAVENOUS at 06:06

## 2025-06-18 RX ADMIN — CEFUROXIME 1.5 G: 1.5 INJECTION, POWDER, FOR SOLUTION INTRAVENOUS at 06:06

## 2025-06-18 RX ADMIN — PROPOFOL 115 MCG/KG/MIN: 10 INJECTION, EMULSION INTRAVENOUS at 06:06

## 2025-06-18 RX ADMIN — SODIUM CHLORIDE, SODIUM GLUCONATE, SODIUM ACETATE, POTASSIUM CHLORIDE AND MAGNESIUM CHLORIDE: 526; 502; 368; 37; 30 INJECTION, SOLUTION INTRAVENOUS at 06:06

## 2025-06-18 RX ADMIN — MORPHINE SULFATE 2 MG: 4 INJECTION, SOLUTION INTRAMUSCULAR; INTRAVENOUS at 08:06

## 2025-06-18 RX ADMIN — PROPOFOL 50 MG: 10 INJECTION, EMULSION INTRAVENOUS at 06:06

## 2025-06-18 NOTE — TRANSFER OF CARE
"Anesthesia Transfer of Care Note    Patient: Jocelyne Dickinson    Procedure(s) Performed: Procedure(s) (LRB):  [id number] (N/A)    Patient location: OPS    Anesthesia Type: MAC    Transport from OR: Transported from OR on room air with adequate spontaneous ventilation    Post pain: adequate analgesia    Post assessment: no apparent anesthetic complications    Post vital signs: stable    Level of consciousness: awake, alert and oriented    Nausea/Vomiting: no nausea/vomiting    Complications: none    Transfer of care protocol was followed      Last vitals: Visit Vitals  /78 (BP Location: Left forearm, Patient Position: Lying)   Pulse 78   Temp 36.6 °C (97.9 °F) (Oral)   Resp 18   Ht 5' 4" (1.626 m)   Wt 64.8 kg (142 lb 13.7 oz)   SpO2 100%   Breastfeeding No   BMI 24.52 kg/m²     "

## 2025-06-18 NOTE — OP NOTE
[facility] - Periop Services  Cardiothoracic Surgery  Operative Note    SUMMARY     Date of Procedure: 6/18/2025     Procedure:  Right subclavian vein MediPort placement    Surgeon: AZAM Lees     Assistant: Frank    Pre-Operative Diagnosis:  Lung cancer    Post-Operative Diagnosis:  Same    Anesthesia: General    Operative Findings (including complications, if any):  Dictated    Description of Technical Procedures: The patient was lived to the operating room, and monitored anesthesia care was performed.  1% lidocaine was used to anesthetize the anterior chest wall and venipuncture was performed in the subclavian vein.  Under fluoroscopic guidance a wire was advanced to the superior vena cava/right atrial junction.  The peel-away sheath was then placed.  4 cm incision was made just inferior to the clavicle and subcutaneous tissues were dissected with the Bovie electrocautery.  The prepectoral fascia was identified.  A subcutaneous pocket was created and the MediPort was appropriately prepared and flushed.  Two 0 Prolene sutures were used to anchor the port to the chest wall.  The MediPort tube was then placed through the peel-away sheath and terminated in the level of the right atrial/superior vena cava junction.  The peel-away sheath was removed.  An anchor suture was placed at the venipuncture site to obviate back bleeding.  The port was flushed.  Subcutaneous tissues were irrigated with saline irrigation and closed in layers with Vicryl.  The patient tolerated the procedure well will be delivered to the recovery room in stable condition.  Chest x-ray thick occlusion of the procedure revealed no evidence of pneumothorax and the port was appropriately placed.     Estimated Blood Loss (EBL): 1 mL          Specimens:   Specimen (24h ago, onward)      None                    Condition: Stable    Disposition: PACU - hemodynamically stable.

## 2025-06-18 NOTE — ANESTHESIA PREPROCEDURE EVALUATION
06/18/2025  Jocelyne Dickinson is a 60 y.o., female.      Pre-op Assessment    I have reviewed the Patient Summary Reports.    I have reviewed the NPO Status.   I have reviewed the Medications.     Review of Systems  Anesthesia Hx:  No problems with previous Anesthesia             Denies Family Hx of Anesthesia complications.    Denies Personal Hx of Anesthesia complications.                    Cardiovascular:  Cardiovascular Normal                   No Cardiac Complaints                           Pulmonary:  Pulmonary Normal        No Pulmonary Complaints               Hepatic/GI:        No Current GERD Sx               Physical Exam  General: Cooperative    Airway:  Mallampati: II / I  Mouth Opening: Normal    Chest/Lungs:  Normal Respiratory Rate    Heart:  Rate: Normal    Anesthesia Plan  Type of Anesthesia, risks & benefits discussed:    Anesthesia Type: MAC  Intra-op Monitoring Plan: Standard ASA Monitors  Induction:  IV  Airway Plan: , Awake  Informed Consent: Informed consent signed with the Patient and all parties understand the risks and agree with anesthesia plan.  All questions answered.   ASA Score: 3  Day of Surgery Review of History & Physical: H&P Update referred to the surgeon/provider.  Anesthesia Plan Notes: Nasal Cannula vs O2 Via Facemask  Possible transition to General Anesthesia discussed.with  Pt/family. Accected    Ready For Surgery From Anesthesia Perspective.   .

## 2025-06-19 ENCOUNTER — CLINICAL SUPPORT (OUTPATIENT)
Dept: HEMATOLOGY/ONCOLOGY | Facility: CLINIC | Age: 60
End: 2025-06-19
Payer: COMMERCIAL

## 2025-06-19 DIAGNOSIS — C7A.1 LARGE CELL NEUROENDOCRINE CARCINOMA OF RIGHT MAIN BRONCHUS: Primary | ICD-10-CM

## 2025-06-19 NOTE — PROGRESS NOTES
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 Lewis Lees     Nutrition Recommendations:  1. Diabetic diet as tolerated    Nutrition Assessment    6/19/25: This is a 60 y.o.female with a medical diagnosis of lung CA. She reports good appetite and po intake; notes that her work schedule does make regular meal intake more difficult, but she eats well when she can. She does not really follow a diabetes diet at home but is aware of recommendations. No c/o n/v/c/d. She has lost ~5# in the past couple months which she attributes to sx recovery.    Nutrition Factors Affecting Intake  none identified    PMHx: COPD, DM, GERD, HTN, HLD, pancreatitis    Allergies: Aller ext-american cockroach; Allerg ext-tree poll-red maple; Cat hair standardized allergenic extract; Dog hair standardized allergenic extract; Grass pollen-ruslan, standard; Horse dander; Tramadol; Tree pollen-box elder; Tree pollen-pecan; and Trulicity [dulaglutide]    Current Medications:  Current Medications[1]    Labs: 6/16/25 chloride 110 (H), glu 210 (H)    Anthropometrics    Height:   Ht Readings from Last 1 Encounters:   06/16/25 5' 4" (1.626 m)      Weight:   Wt Readings from Last 1 Encounters:   06/18/25 64.8 kg (142 lb 13.7 oz)        Usual Body Weight: 66.8 kg (147 lb)  % Weight Change: -3.4% in 2 months    BMI: 24.5 m2  Normal (18.5-24.9)    Ideal Weight: 54.5 kg (120 lb)  % Ideal Weight: 118%      Nutrition Diagnosis    PES: Food and nutrition related knowledge deficit related to chronic illness as evidenced by lack of prior exposure to oncology nutrition. (resolved)     Nutrition Risk  low    Nutrition " Intervention    Interventions(treatment strategy):  general/healthful diet and purpose of nutrition education    Education Provided: diabetic diet and food safety guidelines  Teaching Method: explanation  Comprehension: verbalizes understanding  Barriers to Learning: none evident  Expected Compliance: good  Comments: All questions were answered and dietitian's contact information was provided.      Nutrition Monitoring and Evaluation    Ongoing monitoring not warranted at this time. Please consult RD prn.        Shobha Eisenberg, MS, RD, , LDN                                                                                                                                                                                                                                                                                       [1]   Current Outpatient Medications:     acetaminophen (TYLENOL) 500 MG tablet, Take 1,000 mg by mouth nightly as needed for Pain., Disp: , Rfl:     albuterol (PROVENTIL/VENTOLIN HFA) 90 mcg/actuation inhaler, inhale TWO puffs EVERY 4 TO 6 HOURS AS NEEDED FOR wheezing, Disp: 6.7 g, Rfl: 6    ALPRAZolam (XANAX) 0.5 MG tablet, Take 1 tablet (0.5 mg total) by mouth 2 (two) times daily., Disp: 60 tablet, Rfl: 5    atorvastatin (LIPITOR) 40 MG tablet, Take 40 mg by mouth once daily., Disp: , Rfl:     azelastine (ASTELIN) 137 mcg (0.1 %) nasal spray, 1 spray by Nasal route daily as needed for Rhinitis., Disp: , Rfl:     b complex vitamins tablet, Take 1 tablet by mouth once daily., Disp: , Rfl:     blood-glucose meter,continuous (DEXCOM G7 ) Misc, 1 each by Misc.(Non-Drug; Combo Route) route continuous., Disp: 1 each, Rfl: 0    blood-glucose sensor (DEXCOM G7 SENSOR) Kristen, 1 each by Misc.(Non-Drug; Combo Route) route every 10 days., Disp: 5 each, Rfl: 11    budesonide-formoterol 160-4.5 mcg (SYMBICORT) 160-4.5 mcg/actuation HFAA, INHALE TWO PUFFS TWICE A DAY, Disp: 10.2 g, Rfl: 6    citalopram (CELEXA)  10 MG tablet, TAKE ONE TABLET BY MOUTH ONCE DAILY WITH 20 MG, Disp: 30 tablet, Rfl: 5    citalopram (CELEXA) 20 MG tablet, Take 1 tablet (20 mg total) by mouth once daily., Disp: 30 tablet, Rfl: 5    clopidogreL (PLAVIX) 75 mg tablet, TAKE ONE TABLET BY MOUTH DAILY, Disp: 30 tablet, Rfl: 4    dexAMETHasone (DECADRON) 4 MG Tab, Take 2 tablets (8 mg total) by mouth once daily. on days 2-4 of each chemotherapy cycle., Disp: 6 tablet, Rfl: 5    docusate sodium (COLACE) 100 MG capsule, Take 1 capsule (100 mg total) by mouth 2 (two) times daily., Disp: 60 capsule, Rfl: 5    esomeprazole (NEXIUM) 40 MG capsule, TAKE ONE CAPSULE BY MOUTH BEFORE breakfast, Disp: 30 capsule, Rfl: 9    gabapentin (NEURONTIN) 300 MG capsule, TAKE ONE CAPSULE BY MOUTH EVERY EVENING, Disp: 30 capsule, Rfl: 11    glimepiride (AMARYL) 2 MG tablet, TAKE ONE TABLET BY MOUTH TWICE DAILY FOR DIABETES, Disp: 180 tablet, Rfl: 5    hydrocortisone 2.5 % cream, SMARTSIG:sparingly Topical 3 Times Daily, Disp: , Rfl:     hydrOXYzine pamoate (VISTARIL) 25 MG Cap, TAKE ONE CAPSULE BY MOUTH DAILY, Disp: 30 capsule, Rfl: 5    ibuprofen (ADVIL,MOTRIN) 800 MG tablet, Take 800 mg by mouth daily as needed for Pain., Disp: , Rfl:     insulin aspart U-100 (NOVOLOG FLEXPEN U-100 INSULIN) 100 unit/mL (3 mL) InPn pen, Inject 6 Units into the skin 3 (three) times daily with meals., Disp: 5.4 mL, Rfl: 11    insulin glargine U-100, Lantus, (LANTUS SOLOSTAR U-100 INSULIN) 100 unit/mL (3 mL) InPn pen, Inject 30 Units into the skin once daily., Disp: , Rfl:     linaCLOtide (LINZESS) 145 mcg Cap capsule, Take 145 mcg by mouth before breakfast., Disp: , Rfl:     losartan (COZAAR) 100 MG tablet, TAKE 1 TABLET BY MOUTH DAILY FOR BLOOD PRESSURE, Disp: 30 tablet, Rfl: 5    mirtazapine (REMERON) 15 MG tablet, TAKE 1/2 TABLET BY MOUTH DAILY EVERY EVENING, Disp: 15 tablet, Rfl: 11    NIFEdipine (PROCARDIA-XL) 60 MG (OSM) 24 hr tablet, TAKE ONE TABLET BY MOUTH DAILY FOR BLOOD PRESSURE,  "Disp: 30 tablet, Rfl: 5    OLANZapine (ZYPREXA) 5 MG tablet, Take 1 tablet (5 mg total) by mouth every evening. on days 1-4 of each chemotherapy cycle., Disp: 4 tablet, Rfl: 5    pen needle, diabetic 31 gauge x 3/16" Ndle, 1 Pen by Misc.(Non-Drug; Combo Route) route Daily., Disp: 100 each, Rfl: 1    pioglitazone (ACTOS) 30 MG tablet, TAKE ONE TABLET BY MOUTH DAILY, Disp: 30 tablet, Rfl: 2    prochlorperazine (COMPAZINE) 5 MG tablet, Take 2 tablets (10 mg total) by mouth every 6 (six) hours as needed for Nausea., Disp: 20 tablet, Rfl: 5    SPIRIVA WITH HANDIHALER 18 mcg inhalation capsule, Inhale 1 capsule into the lungs Daily., Disp: , Rfl:     sucralfate (CARAFATE) 100 mg/mL suspension, Take 10 mLs (1 g total) by mouth as needed., Disp: 414 mL, Rfl: 6    TRADJENTA 5 mg Tab tablet, TAKE ONE TABLET BY MOUTH DAILY, Disp: 30 tablet, Rfl: 5    "

## 2025-06-22 ENCOUNTER — TELEPHONE (OUTPATIENT)
Dept: PHARMACY | Facility: CLINIC | Age: 60
End: 2025-06-22
Payer: COMMERCIAL

## 2025-06-22 NOTE — TELEPHONE ENCOUNTER
[facility]/Population Health Chart Review & Patient Outreach Details For Medication Adherence Project    Reason for Outreach Encounter: 3rd Party payor non-compliance report ([employer], [employer], C, etc)  2.  Patient Outreach Method: Reviewed Patient Chart  3.   Medication in question: Losartan 100mg   LAST FILLED: 6/9/25 for 30 day supply  Hypertension Medications              losartan (COZAAR) 100 MG tablet TAKE 1 TABLET BY MOUTH DAILY FOR BLOOD PRESSURE    NIFEdipine (PROCARDIA-XL) 60 MG (OSM) 24 hr tablet TAKE ONE TABLET BY MOUTH DAILY FOR BLOOD PRESSURE              4.  Reviewed and or Updates Made To: Patient Chart  5. Outreach Outcomes and/or actions taken: Patient filled medication and is on track to be adherent

## 2025-06-27 ENCOUNTER — TELEPHONE (OUTPATIENT)
Dept: PHARMACY | Facility: CLINIC | Age: 60
End: 2025-06-27
Payer: COMMERCIAL

## 2025-06-27 DIAGNOSIS — C34.91 SQUAMOUS CELL LUNG CANCER, RIGHT: ICD-10-CM

## 2025-06-27 DIAGNOSIS — C7A.1 LARGE CELL NEUROENDOCRINE CARCINOMA OF RIGHT MAIN BRONCHUS: Primary | ICD-10-CM

## 2025-06-27 NOTE — PROGRESS NOTES
HEMATOLOGY/ONCOLOGY OFFICE CLINIC VISIT    Visit Information: follow up    Initial Evaluation: 6/3/25  Referring Provider: Dr. Lewis Lees- cardiothoracic   Other providers: Dr. 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 Lewis Lees    Plan of care:     Imaging:  CT lung screening 06/28/2023 at [place]:  there is an 8.2 mm anterior pleural-based nodule in the right upper lobe.  5.7 mm semi-solid nodule in the posterior right upper lobe with surrounding interstitial prominence.  Focal scarring seen in the superior segment of the right lower lobe.  3.8 mm pleural-based nodule in the lateral aspect of the lingula.  Lung rads category 4A-suspicious abnormality.  Three-month follow-up low-dose CT recommended.  Alternatively PET-CT.  7/13/23 PET/CT @ [facility]: No PET evidence of metabolically active disease. Tiny left lower lobe nodule without abnormal uptake, however, it is too small to characterize by PET and should be monitor.  1/11/2024 CT Low Dose Lung Screening @ [facility]:Previously noted anterior right upper lobe spiculated nodule is again appreciated and is unchanged in size and appearance.  Previously seen groundglass nodule posterior right upper lobe is no longer identified.  Pleural-based nodule noted in the lingula is unchanged in size and appearance.  Lung rads category 2-benign appearance or behavior  1/16/25 CT Low Dose Lung Screening @ [facility]:  Significant changes in the appearance of the spiculated nodule in the anterior right upper lobe when compared with the 2 previous examinations.  No measures 10.3 than 19.3 mm with spiculation extending to the pleural surface anteriorly and  medially.  Associated atelectasis in the anteromedial right upper lobe.  Previously seen juxtapleural nodule in the lingula is unchanged in size and appearance. Lung rads category 4A-suspicious abnormality.  PET-CT and/or biopsy considered  3/3/25 PET/CT Oncologic's: Mild uptake has developed in the right pulmonary hilum with SUV at 3.9, which compared to the uptake in the left pulmonary hilum with peak SUV of 2.6.  Uptake measured 1.3 cm.  Hypermetabolic spiculated nodule developing the anterior medially right upper lobe measuring 1.7 cm with SUV of 8.7, highly concerning for primary lung cancer.  Impression new hypermetabolic spiculated nodule in the anteromedial right upper lobe, concerning of malignancy.  New mild uptake in the right pulmonary hilum which is somewhat concerning and should be closely monitored.  No other suspicious uptake  6/24/25 PET/CT Oncologics:  Interval postsurgical changes of right upper lobectomy without abnormal uptake to suggest metabolically-active residual/recurrent or metastatic disease  Interval resolution of mild uptake in the right pulmonary hilum  No new suspicious uptake    Pathology:  3/21/25:  10R:  Negative for malignancy.  Cellular smear showing benign bronchial epithelial cells and lymphoid tissue admixed with hemosiderin laden macrophage.    4/23/25 RUL Lobectomy:  1. LYMPH NODES, 10R, EXCISION:    - THREE LYMPH NODES, NEGATIVE FOR METASTATIC CARCINOMA.   2. LUNG, RIGHT UPPER LOBE, LOBECTOMY:    - COMBINED LARGE CELL NEUROENDOCRINE AND SQUAMOUS CELL CARCINOMA,   - ONE HILAR LYMPH NODE, NEGATIVE FOR METASTATIC CARCINOMA.   3. LYMPH NODE 11R, EXCISION:    - ONE LYMPH NODE, NEGATIVE FOR METASTATIC CARCINOMA   G3, pT2a pN0  PDL-1: 1%      [place] 5/9/2025:  CDKN2A ( Pathogenic ) p.R99P - c.296G>C Missense variant - LOFVAF: 17.1%   FGFR1 ( Pathogenic ) FGFR1 Copy number gain   PTEN ( Pathogenic ) p.E307* - c.919G>T Stop gain - LOFVAF: 25.8%   TP53 ( Pathogenic ) p.R249M -  c.746G>T Missense variant - LOF    HER2 Result NEGATIVE   Score 0         CLINICAL HISTORY:       Patient: Jocelyne Dickinson is a 60 y.o. female with history of diabetes, COPD, hypertension, hyperlipidemia, smoker (now vaping) kindly referred for newly diagnosed non-small-cell lung carcinoma.    Patient was undergoing low-dose CT scan lung cancer screening and most recent showed significant changes in a spiculated nodule in the right upper lobe.  PET-CT was performed that showed activity in that area but no distant metastases.    Patient underwent VATS right thoracotomy on 4/23/2025 by Dr. Lewis Lees.  Pathology revealed combined large cell neuroendocrine and squamous cell carcinoma.  All lymph nodes were negative for metastatic disease with a pathologic staging of 1B (T2aN0). Patient was presented at the multidisciplinary tumor board on 05/14/2025 and recommendations to repeat PET-CT or MRI and for adjuvant chemotherapy.  No role for radiation therapy.    Patient is here today and has been healing well from the surgery.  She does report some wheezing for about a with secondary to allergies.  No coughing.  No shortness of breath.  No chest pain.  No fever, chills, sweats.  She is here with her sister that has history of lung cancer.  Father had history of throat cancer.  They will also smoker.  Brother with renal cell carcinoma (no smoker).    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 )      Interval History:  Patient returns to clinic for a follow up for scan results and treatment clearance for C1D1 of Cisplatin/Etoposide D1,2,3 q3w x 4-6 cycles.  Overall, she is doing well today, without any new concerns.   Continues with some wheezing when taking deep breaths.  Denies SOB, chest pain, fever, chills, recent infections.  Scans and labs reviewed in detail with patient and sisters, in agreeance with plan.      Past Medical History:   Diagnosis Date    Abdominal pain      resolved    Anxiety disorder, unspecified     Carcinoma     Carotid artery stenosis     Cellulitis of scalp     resolved    Constipation     COPD (chronic obstructive pulmonary disease)     Diabetes mellitus     Digestive disorder     acid reflux    Disorder of pancreas     Disorder of pancreatic duct     Emphysema, unspecified     Family history of adverse effect to anesthesia     Fatigue     Gastrointestinal tract imaging abnormality     GERD (gastroesophageal reflux disease)     HTN (hypertension)     Insomnia     Mixed hyperlipidemia     Neuropathy     Nodule of apex of right lung     Osteoarthritis     Pancreatitis     Personal history of nicotine dependence     Pneumonia, unspecified organism     Shortness of breath     SOB (shortness of breath) on exertion     Syncope     Weight loss       Past Surgical History:   Procedure Laterality Date    BRONCHOSCOPY  03/19/2025    [facility]-    CATARACT EXTRACTION      COLONOSCOPY  07/02/2021    Dr. Nila Sharma    EGD, WITH CLOSED BIOPSY  03/12/2024    Procedure: EGD, WITH CLOSED BIOPSY;  Surgeon: Diego Atkinson MD;  Location: [facility];  Service: Gastroenterology;;  CBD 5 mm, HOP Cyst 4.5mm x 5 mm, Chronic Pancreatitis, PD Head 2.2 mm, PD Body 4.2 mm,    EYE SURGERY  2022    Cataract surgery on both eyes    INSERTION OF TUNNELED CENTRAL VENOUS CATHETER (CVC) WITH SUBCUTANEOUS PORT N/A 6/18/2025    Procedure: [id number];  Surgeon: Lewis Lees MD;  Location: [facility] OR;  Service: Cardiovascular;  Laterality: N/A;  requests 700    REPAIR OF CAROTID ARTERY      THORACOTOMY Right 4/23/2025    Procedure: THORACOTOMY;  Surgeon: Lewis Lees MD;  Location: [facility] OR;  Service: Cardiovascular;  Laterality: Right;    TONSILLECTOMY  1967    I was 4 years old when tonsils were removed    ULTRASOUND, ENDOSCOPIC, WITH FINE NEEDLE ASPIRATION N/A 03/12/2024    Procedure: UPPER EUS W/ POSS FNA;  Surgeon: Diego Atkinson MD;  Location: [facility] OR;   Service: Gastroenterology;  Laterality: N/A;  MRI abd- Limited exam due to image quality. Overall pancreatic atrophy. MAin PD dilation to 9mm in the tail with dialtied side branches. Rerlative abrupt caliber change of main PD at body and tail junction.    VIDEO-ASSISTED THORACOSCOPIC SURGERY (VATS) Right 4/23/2025    Procedure: VATS (VIDEO-ASSISTED THORACOSCOPIC SURGERY);  Surgeon: Lewis Lees MD;  Location: [facility] OR;  Service: Cardiovascular;  Laterality: Right;  RIGHT VATS, RIGHT UPPER LOBECTOMY    WRIST SURGERY Bilateral      Family History   Problem Relation Name Age of Onset    Hypertension Mother      Hypertension Father Yordy Teena     Heart disease Father Yordy Teena     Cancer Father Yordy Markelltedenice     Stroke Father Yordy Markelltedenice     Cancer Maternal Grandmother      Vision loss Maternal Grandmother      Cancer Sister Maribel Dickinson Arcement 50 - 59    Cancer Brother Moustapha Dickinson 60 - 69          Review of patient's allergies indicates:   Allergen Reactions    Aller ext-american cockroach Itching and Other (See Comments)    Allerg ext-tree poll-red maple Itching and Other (See Comments)    Cat hair standardized allergenic extract Itching and Other (See Comments)    Dog hair standardized allergenic extract Itching and Other (See Comments)    Grass pollen-ruslan, standard Itching and Other (See Comments)    Horse dander Itching and Other (See Comments)    Tramadol Itching    Tree pollen-box elder Itching and Other (See Comments)    Tree pollen-pecan Itching and Other (See Comments)    Trulicity [dulaglutide] Other (See Comments)     Patient had pancreatitis        Medications Ordered Prior to Encounter[1]   Review of Systems   Constitutional:  Negative for activity change, appetite change, chills, fatigue, fever, night sweats and unexpected weight change.   HENT:  Negative for mouth dryness, mouth sores, nosebleeds, sore throat and trouble swallowing.    Eyes: Negative.  Negative for visual  "disturbance.   Respiratory:  Negative for cough and shortness of breath.    Cardiovascular:  Negative for chest pain, palpitations and leg swelling.   Gastrointestinal:  Negative for abdominal distention, abdominal pain, blood in stool, change in bowel habit, constipation, diarrhea, nausea and vomiting.   Endocrine: Negative.    Genitourinary:  Negative for dysuria, frequency, hematuria and urgency.   Musculoskeletal:  Negative for arthralgias, back pain, myalgias and neck pain.   Integumentary:  Negative for rash.   Neurological:  Negative for dizziness, tremors, syncope, speech difficulty, weakness, light-headedness, numbness, headaches and memory loss.   Hematological:  Negative for adenopathy. Does not bruise/bleed easily.   Psychiatric/Behavioral:  Negative for confusion and suicidal ideas. The patient is not nervous/anxious.               Vitals:    07/01/25 0840   BP: (!) 151/71   BP Location: Right arm   Patient Position: Sitting   Pulse: 62   Resp: 16   Temp: 97.6 °F (36.4 °C)   TempSrc: Oral   SpO2: 98%   Weight: 65.6 kg (144 lb 9.6 oz)   Height: 5' 4.02" (1.626 m)        Wt Readings from Last 6 Encounters:   07/01/25 65.6 kg (144 lb 9.6 oz)   07/01/25 65.6 kg (144 lb 9.6 oz)   06/18/25 64.8 kg (142 lb 13.7 oz)   06/12/25 64.9 kg (143 lb)   06/09/25 66.2 kg (146 lb)   06/03/25 66.2 kg (145 lb 14.4 oz)     Body mass index is 24.81 kg/m².  Body surface area is 1.72 meters squared.  Physical Exam  Vitals and nursing note reviewed. Exam conducted with a chaperone present.   Constitutional:       General: She is not in acute distress.     Appearance: Normal appearance. She is well-developed.   HENT:      Head: Normocephalic and atraumatic.      Mouth/Throat:      Mouth: Mucous membranes are moist.   Eyes:      General: No scleral icterus.     Extraocular Movements: Extraocular movements intact.      Conjunctiva/sclera: Conjunctivae normal.      Pupils: Pupils are equal, round, and reactive to light.   Neck:     "  Vascular: No JVD.   Cardiovascular:      Rate and Rhythm: Normal rate and regular rhythm.      Heart sounds: No murmur heard.  Pulmonary:      Effort: Pulmonary effort is normal.      Breath sounds: Wheezing present. No rhonchi.   Abdominal:      General: Bowel sounds are normal. There is no distension.      Palpations: Abdomen is soft. There is no mass.      Tenderness: There is no abdominal tenderness.   Musculoskeletal:         General: No swelling or deformity.      Cervical back: Neck supple.   Lymphadenopathy:      Head:      Right side of head: No submental or submandibular adenopathy.      Left side of head: No submental or submandibular adenopathy.      Cervical: No cervical adenopathy.      Lower Body: No right inguinal adenopathy. No left inguinal adenopathy.   Skin:     General: Skin is warm.      Coloration: Skin is not jaundiced.      Findings: No lesion or rash.      Nails: There is no clubbing.   Neurological:      General: No focal deficit present.      Mental Status: She is alert and oriented to person, place, and time.      Cranial Nerves: Cranial nerves 2-12 are intact.   Psychiatric:         Attention and Perception: Attention normal.         Behavior: Behavior is cooperative.         Judgment: Judgment normal.       ECOG SCORE    0 - Fully active-able to carry on all pre-disease performance without restriction         Laboratory:  CBC with Differential:  Lab Results   Component Value Date    WBC 5.12 07/01/2025    RBC 3.86 (L) 07/01/2025    HGB 11.0 (L) 07/01/2025    HCT 34.4 (L) 07/01/2025    MCV 89.1 07/01/2025    MCH 28.5 07/01/2025    MCHC 32.0 (L) 07/01/2025    RDW 14.5 07/01/2025     07/01/2025    MPV 9.9 07/01/2025        CMP:  Sodium   Date Value Ref Range Status   07/01/2025 140 136 - 145 mmol/L Final   04/21/2021 143 136 - 145 mmol/L Final     Potassium   Date Value Ref Range Status   07/01/2025 3.8 3.5 - 5.1 mmol/L Final   04/21/2021 4.5 3.5 - 5.1 mmol/L Final     Chloride    Date Value Ref Range Status   07/01/2025 110 (H) 98 - 107 mmol/L Final   04/21/2021 106 100 - 109 mmol/L Final     CO2   Date Value Ref Range Status   07/01/2025 23 22 - 29 mmol/L Final     Carbon Dioxide   Date Value Ref Range Status   04/21/2021 31 22 - 33 mmol/L Final     Glucose   Date Value Ref Range Status   07/01/2025 235 (H) 74 - 100 mg/dL Final     Blood Urea Nitrogen   Date Value Ref Range Status   07/01/2025 13.0 9.8 - 20.1 mg/dL Final   04/21/2021 10 7 - 18 mg/dL Final     Creatinine   Date Value Ref Range Status   07/01/2025 0.75 0.55 - 1.02 mg/dL Final   04/21/2021 0.90 0.57 - 1.25 mg/dL Final     Calcium   Date Value Ref Range Status   07/01/2025 8.6 8.4 - 10.2 mg/dL Final   04/21/2021 8.8 8.8 - 10.6 mg/dL Final     Protein Total   Date Value Ref Range Status   07/01/2025 6.3 (L) 6.4 - 8.3 gm/dL Final     Albumin   Date Value Ref Range Status   07/01/2025 3.4 (L) 3.5 - 5.0 g/dL Final     Bilirubin Total   Date Value Ref Range Status   07/01/2025 0.3 <=1.5 mg/dL Final     ALP   Date Value Ref Range Status   07/01/2025 77 40 - 150 unit/L Final     AST   Date Value Ref Range Status   07/01/2025 17 11 - 45 unit/L Final     ALT   Date Value Ref Range Status   07/01/2025 8 0 - 55 unit/L Final     Anion Gap   Date Value Ref Range Status   04/21/2021 6 (L) 8 - 16 mmol/L Final     eGFR    Date Value Ref Range Status   04/21/2021 79 >=60 mL/min/1.73mSq Final     Estimated GFR-Non    Date Value Ref Range Status   04/30/2022 58               Assessment:       1. 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 Squamous cell lung cancer, right    3. 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 Lewis Lees  --NCCN guidelines for stage IB, margins neg (R0): Observe vs adjuvant systemic chemotherapy    I explained to  the patient that she has a combined carcinoma.  Pathology came back as: Large cell neuroendocrine carcinoma and squamous cell carcinoma and both are subtypes of non-small cell lung cancer (NSCLC), but they differ significantly in epidemiology, biology, and therapeutic approach.   Large cell neuroendocrine carcinoma is rare but highly aggressive, accounting for approximately 3% of lung cancers.  Squamous cell carcinoma is more common, representing 20-30% of NSCLC cases.     Comprehensive molecular testing for actionable  mutations (e.g., EGFR, ALK, ROS1, BRAF) and PD-L1 expression is recommended in all advanced NSCLC, This was done and pending.   For large cell neuroendocrine carcinoma, treatment is stage-dependent. In early-stage (I-III) disease, surgical resection is preferred for operable patients. However, due to the high risk of recurrence, adjuvant platinum-etoposide chemotherapy is recommended even for stage I disease.    Discussed briefly chemotherapy, risks versus benefits as well as toxicities associated with it.  Educated the patient on the risks versus benefits as well as toxicities associated with treatment.  Verbally consented the patient to the treatment plan and the patient was educated on the planned duration of the treatment and schedule of the treatment administration.  All questions were answered.        Plan:       - Labs and scans reviewed  - Okay to continue with C1D1 of Cisplatin/Etoposide D1,2,3 q3w x 4-6 cycles  - Continue follow up with Dr. Lees  - Repeat PET/CT after C2  - RTC in 1 week with NP for FU/labs/tox check  - Cbc, cmp, mag- 1 day prior @ [facility]    The patient was seen, interviewed and examined. Pertinent lab and radiology studies were reviewed.   The patient was given ample opportunity to ask questions, and to the best of my abilities, all questions answered to satisfaction; patient demonstrated understanding of what we discussed and agreeable to the plan. Pt instructed  to call should develop concerning signs/symptoms or have further questions.     Patient requires or will require continuous, longitudinal, and active collaborative plan of care related to this patient's health condition, Combined LCNEC - the management of which requires the direction of a practitioner with specialized clinical knowledge, skill, and expertise.     Jany Powers MD  Hematology/Oncology  [facility]    I spent a total of 40 minutes on the day of the visit.This includes face to face time and non-face to face time preparing to see the patient (eg, review of tests), obtaining and/or reviewing separately obtained history, documenting clinical information in the electronic or other health record, independently interpreting results and communicating results to the patient/family/caregiver, or care coordinator.    Professional Services   I, Chelsea Peck LPN, acted solely as a scribe for and in the presence of Dr. Jany Powers, who performed these services.              [1]   Current Outpatient Medications on File Prior to Visit   Medication Sig Dispense Refill    acetaminophen (TYLENOL) 500 MG tablet Take 1,000 mg by mouth nightly as needed for Pain.      albuterol (PROVENTIL/VENTOLIN HFA) 90 mcg/actuation inhaler inhale TWO puffs EVERY 4 TO 6 HOURS AS NEEDED FOR wheezing 6.7 g 6    ALPRAZolam (XANAX) 0.5 MG tablet Take 1 tablet (0.5 mg total) by mouth 2 (two) times daily. 60 tablet 5    atorvastatin (LIPITOR) 40 MG tablet Take 40 mg by mouth once daily.      azelastine (ASTELIN) 137 mcg (0.1 %) nasal spray 1 spray by Nasal route daily as needed for Rhinitis.      b complex vitamins tablet Take 1 tablet by mouth once daily.      blood-glucose meter,continuous (DEXCOM G7 ) Misc 1 each by Misc.(Non-Drug; Combo Route) route continuous. 1 each 0    blood-glucose sensor (DEXCOM G7 SENSOR) Kristen 1 each by Misc.(Non-Drug; Combo Route) route every 10 days. 5 each 11     budesonide-formoterol 160-4.5 mcg (SYMBICORT) 160-4.5 mcg/actuation HFAA INHALE TWO PUFFS TWICE A DAY 10.2 g 6    citalopram (CELEXA) 10 MG tablet TAKE ONE TABLET BY MOUTH ONCE DAILY WITH 20 MG 30 tablet 5    citalopram (CELEXA) 20 MG tablet Take 1 tablet (20 mg total) by mouth once daily. 30 tablet 5    clopidogreL (PLAVIX) 75 mg tablet TAKE ONE TABLET BY MOUTH DAILY 30 tablet 4    dexAMETHasone (DECADRON) 4 MG Tab Take 2 tablets (8 mg total) by mouth once daily. on days 2-4 of each chemotherapy cycle. 6 tablet 5    docusate sodium (COLACE) 100 MG capsule Take 1 capsule (100 mg total) by mouth 2 (two) times daily. 60 capsule 5    esomeprazole (NEXIUM) 40 MG capsule TAKE ONE CAPSULE BY MOUTH BEFORE breakfast 30 capsule 9    gabapentin (NEURONTIN) 300 MG capsule TAKE ONE CAPSULE BY MOUTH EVERY EVENING 30 capsule 11    glimepiride (AMARYL) 2 MG tablet TAKE ONE TABLET BY MOUTH TWICE DAILY FOR DIABETES 180 tablet 5    hydrocortisone 2.5 % cream SMARTSIG:sparingly Topical 3 Times Daily      hydrOXYzine pamoate (VISTARIL) 25 MG Cap TAKE ONE CAPSULE BY MOUTH DAILY 30 capsule 5    ibuprofen (ADVIL,MOTRIN) 800 MG tablet Take 800 mg by mouth daily as needed for Pain.      insulin aspart U-100 (NOVOLOG FLEXPEN U-100 INSULIN) 100 unit/mL (3 mL) InPn pen Inject 6 Units into the skin 3 (three) times daily with meals. 5.4 mL 11    insulin glargine U-100, Lantus, (LANTUS SOLOSTAR U-100 INSULIN) 100 unit/mL (3 mL) InPn pen Inject 30 Units into the skin once daily.      linaCLOtide (LINZESS) 145 mcg Cap capsule Take 145 mcg by mouth before breakfast.      losartan (COZAAR) 100 MG tablet TAKE 1 TABLET BY MOUTH DAILY FOR BLOOD PRESSURE 30 tablet 5    mirtazapine (REMERON) 15 MG tablet TAKE 1/2 TABLET BY MOUTH DAILY EVERY EVENING 15 tablet 11    NIFEdipine (PROCARDIA-XL) 60 MG (OSM) 24 hr tablet TAKE ONE TABLET BY MOUTH DAILY FOR BLOOD PRESSURE 30 tablet 5    OLANZapine (ZYPREXA) 5 MG tablet Take 1 tablet (5 mg total) by mouth every  "evening. on days 1-4 of each chemotherapy cycle. 4 tablet 5    pen needle, diabetic 31 gauge x 3/16" Ndle 1 Pen by Misc.(Non-Drug; Combo Route) route Daily. 100 each 1    pioglitazone (ACTOS) 30 MG tablet TAKE ONE TABLET BY MOUTH DAILY 30 tablet 2    prochlorperazine (COMPAZINE) 5 MG tablet Take 2 tablets (10 mg total) by mouth every 6 (six) hours as needed for Nausea. 20 tablet 5    SPIRIVA WITH HANDIHALER 18 mcg inhalation capsule Inhale 1 capsule into the lungs Daily.      sucralfate (CARAFATE) 100 mg/mL suspension Take 10 mLs (1 g total) by mouth as needed. 414 mL 6    TRADJENTA 5 mg Tab tablet TAKE ONE TABLET BY MOUTH DAILY 30 tablet 5     No current facility-administered medications on file prior to visit.     "

## 2025-06-27 NOTE — TELEPHONE ENCOUNTER
[facility]/Population Health Chart Review & Patient Outreach Details For Medication Adherence Project    Reason for Outreach Encounter: 3rd Party payor non-compliance report ([employer], [employer], [employer], etc)  2.  Patient Outreach Method: Reviewed patient chart   3.   Medication in question:    Hyperlipidemia Medications              atorvastatin (LIPITOR) 40 MG tablet Take 40 mg by mouth once daily.                  LF 30 ds 6/9/25    4.  Reviewed and or Updates Made To: Patient Chart  5. Outreach Outcomes and/or actions taken: Patient filled medication and is on track to be adherent  Additional Notes:

## 2025-06-30 ENCOUNTER — TELEPHONE (OUTPATIENT)
Dept: HEMATOLOGY/ONCOLOGY | Facility: CLINIC | Age: 60
End: 2025-06-30
Payer: COMMERCIAL

## 2025-06-30 NOTE — TELEPHONE ENCOUNTER
Received call from patient. She would like to know if its ok to wear her dexcom while receiving chemotherapy.

## 2025-07-01 ENCOUNTER — LAB VISIT (OUTPATIENT)
Dept: LAB | Facility: HOSPITAL | Age: 60
End: 2025-07-01
Payer: COMMERCIAL

## 2025-07-01 ENCOUNTER — INFUSION (OUTPATIENT)
Facility: HOSPITAL | Age: 60
End: 2025-07-01
Attending: FAMILY MEDICINE
Payer: COMMERCIAL

## 2025-07-01 ENCOUNTER — OFFICE VISIT (OUTPATIENT)
Facility: CLINIC | Age: 60
End: 2025-07-01
Payer: COMMERCIAL

## 2025-07-01 VITALS
RESPIRATION RATE: 16 BRPM | WEIGHT: 144.63 LBS | OXYGEN SATURATION: 98 % | HEART RATE: 62 BPM | BODY MASS INDEX: 24.69 KG/M2 | HEIGHT: 64 IN | SYSTOLIC BLOOD PRESSURE: 151 MMHG | TEMPERATURE: 98 F | DIASTOLIC BLOOD PRESSURE: 71 MMHG

## 2025-07-01 VITALS
DIASTOLIC BLOOD PRESSURE: 71 MMHG | OXYGEN SATURATION: 98 % | RESPIRATION RATE: 16 BRPM | TEMPERATURE: 98 F | HEIGHT: 64 IN | WEIGHT: 144.63 LBS | HEART RATE: 62 BPM | SYSTOLIC BLOOD PRESSURE: 151 MMHG | BODY MASS INDEX: 24.69 KG/M2

## 2025-07-01 DIAGNOSIS — C34.91 SQUAMOUS CELL LUNG CANCER, RIGHT: ICD-10-CM

## 2025-07-01 DIAGNOSIS — Z90.2 STATUS POST PARTIAL LOBECTOMY OF LUNG: ICD-10-CM

## 2025-07-01 DIAGNOSIS — C7A.1 LARGE CELL NEUROENDOCRINE CARCINOMA OF RIGHT MAIN BRONCHUS: Primary | ICD-10-CM

## 2025-07-01 DIAGNOSIS — C7A.1 LARGE CELL NEUROENDOCRINE CARCINOMA OF RIGHT MAIN BRONCHUS: ICD-10-CM

## 2025-07-01 LAB
ALBUMIN SERPL-MCNC: 3.4 G/DL (ref 3.5–5)
ALBUMIN/GLOB SERPL: 1.2 RATIO (ref 1.1–2)
ALP SERPL-CCNC: 77 UNIT/L (ref 40–150)
ALT SERPL-CCNC: 8 UNIT/L (ref 0–55)
ANION GAP SERPL CALC-SCNC: 7 MEQ/L
AST SERPL-CCNC: 17 UNIT/L (ref 11–45)
BASOPHILS # BLD AUTO: 0.03 X10(3)/MCL
BASOPHILS NFR BLD AUTO: 0.6 %
BILIRUB SERPL-MCNC: 0.3 MG/DL
BUN SERPL-MCNC: 13 MG/DL (ref 9.8–20.1)
CALCIUM SERPL-MCNC: 8.6 MG/DL (ref 8.4–10.2)
CEA SERPL-MCNC: <1.73 NG/ML (ref 0–3)
CHLORIDE SERPL-SCNC: 110 MMOL/L (ref 98–107)
CO2 SERPL-SCNC: 23 MMOL/L (ref 22–29)
CREAT SERPL-MCNC: 0.75 MG/DL (ref 0.55–1.02)
CREAT/UREA NIT SERPL: 17
EOSINOPHIL # BLD AUTO: 0.05 X10(3)/MCL (ref 0–0.9)
EOSINOPHIL NFR BLD AUTO: 1 %
ERYTHROCYTE [DISTWIDTH] IN BLOOD BY AUTOMATED COUNT: 14.5 % (ref 11.5–17)
GFR SERPLBLD CREATININE-BSD FMLA CKD-EPI: >60 ML/MIN/1.73/M2
GLOBULIN SER-MCNC: 2.9 GM/DL (ref 2.4–3.5)
GLUCOSE SERPL-MCNC: 235 MG/DL (ref 74–100)
HCT VFR BLD AUTO: 34.4 % (ref 37–47)
HGB BLD-MCNC: 11 G/DL (ref 12–16)
IMM GRANULOCYTES # BLD AUTO: 0.01 X10(3)/MCL (ref 0–0.04)
IMM GRANULOCYTES NFR BLD AUTO: 0.2 %
LYMPHOCYTES # BLD AUTO: 1.81 X10(3)/MCL (ref 0.6–4.6)
LYMPHOCYTES NFR BLD AUTO: 35.4 %
MAGNESIUM SERPL-MCNC: 2 MG/DL (ref 1.6–2.6)
MCH RBC QN AUTO: 28.5 PG (ref 27–31)
MCHC RBC AUTO-ENTMCNC: 32 G/DL (ref 33–36)
MCV RBC AUTO: 89.1 FL (ref 80–94)
MONOCYTES # BLD AUTO: 0.64 X10(3)/MCL (ref 0.1–1.3)
MONOCYTES NFR BLD AUTO: 12.5 %
NEUTROPHILS # BLD AUTO: 2.58 X10(3)/MCL (ref 2.1–9.2)
NEUTROPHILS NFR BLD AUTO: 50.3 %
NRBC BLD AUTO-RTO: 0 %
PLATELET # BLD AUTO: 256 X10(3)/MCL (ref 130–400)
PMV BLD AUTO: 9.9 FL (ref 7.4–10.4)
POTASSIUM SERPL-SCNC: 3.8 MMOL/L (ref 3.5–5.1)
PROT SERPL-MCNC: 6.3 GM/DL (ref 6.4–8.3)
RBC # BLD AUTO: 3.86 X10(6)/MCL (ref 4.2–5.4)
SODIUM SERPL-SCNC: 140 MMOL/L (ref 136–145)
WBC # BLD AUTO: 5.12 X10(3)/MCL (ref 4.5–11.5)

## 2025-07-01 PROCEDURE — 96367 TX/PROPH/DG ADDL SEQ IV INF: CPT

## 2025-07-01 PROCEDURE — 36415 COLL VENOUS BLD VENIPUNCTURE: CPT

## 2025-07-01 PROCEDURE — 96417 CHEMO IV INFUS EACH ADDL SEQ: CPT

## 2025-07-01 PROCEDURE — 80053 COMPREHEN METABOLIC PANEL: CPT

## 2025-07-01 PROCEDURE — 3077F SYST BP >= 140 MM HG: CPT | Mod: CPTII,S$GLB,, | Performed by: INTERNAL MEDICINE

## 2025-07-01 PROCEDURE — 3078F DIAST BP <80 MM HG: CPT | Mod: CPTII,S$GLB,, | Performed by: INTERNAL MEDICINE

## 2025-07-01 PROCEDURE — 63600175 PHARM REV CODE 636 W HCPCS: Performed by: INTERNAL MEDICINE

## 2025-07-01 PROCEDURE — 99215 OFFICE O/P EST HI 40 MIN: CPT | Mod: S$GLB,,, | Performed by: INTERNAL MEDICINE

## 2025-07-01 PROCEDURE — 96368 THER/DIAG CONCURRENT INF: CPT

## 2025-07-01 PROCEDURE — A4216 STERILE WATER/SALINE, 10 ML: HCPCS | Performed by: INTERNAL MEDICINE

## 2025-07-01 PROCEDURE — 82378 CARCINOEMBRYONIC ANTIGEN: CPT

## 2025-07-01 PROCEDURE — 85025 COMPLETE CBC W/AUTO DIFF WBC: CPT

## 2025-07-01 PROCEDURE — 3008F BODY MASS INDEX DOCD: CPT | Mod: CPTII,S$GLB,, | Performed by: INTERNAL MEDICINE

## 2025-07-01 PROCEDURE — 83735 ASSAY OF MAGNESIUM: CPT

## 2025-07-01 PROCEDURE — 99999 PR PBB SHADOW E&M-EST. PATIENT-LVL V: CPT | Mod: PBBFAC,,, | Performed by: INTERNAL MEDICINE

## 2025-07-01 PROCEDURE — 3052F HG A1C>EQUAL 8.0%<EQUAL 9.0%: CPT | Mod: CPTII,S$GLB,, | Performed by: INTERNAL MEDICINE

## 2025-07-01 PROCEDURE — 96375 TX/PRO/DX INJ NEW DRUG ADDON: CPT

## 2025-07-01 PROCEDURE — 4010F ACE/ARB THERAPY RXD/TAKEN: CPT | Mod: CPTII,S$GLB,, | Performed by: INTERNAL MEDICINE

## 2025-07-01 PROCEDURE — 25000003 PHARM REV CODE 250: Performed by: INTERNAL MEDICINE

## 2025-07-01 PROCEDURE — 96413 CHEMO IV INFUSION 1 HR: CPT

## 2025-07-01 PROCEDURE — G2211 COMPLEX E/M VISIT ADD ON: HCPCS | Mod: S$GLB,,, | Performed by: INTERNAL MEDICINE

## 2025-07-01 RX ORDER — SODIUM CHLORIDE 0.9 % (FLUSH) 0.9 %
10 SYRINGE (ML) INJECTION
Status: DISCONTINUED | OUTPATIENT
Start: 2025-07-01 | End: 2025-07-01 | Stop reason: HOSPADM

## 2025-07-01 RX ORDER — HEPARIN 100 UNIT/ML
500 SYRINGE INTRAVENOUS
Status: CANCELLED | OUTPATIENT
Start: 2025-07-01

## 2025-07-01 RX ORDER — DIPHENHYDRAMINE HYDROCHLORIDE 50 MG/ML
50 INJECTION, SOLUTION INTRAMUSCULAR; INTRAVENOUS ONCE AS NEEDED
Status: CANCELLED | OUTPATIENT
Start: 2025-07-02

## 2025-07-01 RX ORDER — HEPARIN 100 UNIT/ML
500 SYRINGE INTRAVENOUS
Status: DISCONTINUED | OUTPATIENT
Start: 2025-07-01 | End: 2025-07-01 | Stop reason: HOSPADM

## 2025-07-01 RX ORDER — SODIUM CHLORIDE 0.9 % (FLUSH) 0.9 %
10 SYRINGE (ML) INJECTION
Status: CANCELLED | OUTPATIENT
Start: 2025-07-02

## 2025-07-01 RX ORDER — SODIUM CHLORIDE 0.9 % (FLUSH) 0.9 %
10 SYRINGE (ML) INJECTION
Status: CANCELLED | OUTPATIENT
Start: 2025-07-03

## 2025-07-01 RX ORDER — DIPHENHYDRAMINE HYDROCHLORIDE 50 MG/ML
50 INJECTION, SOLUTION INTRAMUSCULAR; INTRAVENOUS ONCE AS NEEDED
Status: CANCELLED | OUTPATIENT
Start: 2025-07-01

## 2025-07-01 RX ORDER — SODIUM CHLORIDE 0.9 % (FLUSH) 0.9 %
10 SYRINGE (ML) INJECTION
Status: CANCELLED | OUTPATIENT
Start: 2025-07-01

## 2025-07-01 RX ORDER — EPINEPHRINE 0.3 MG/.3ML
0.3 INJECTION SUBCUTANEOUS ONCE AS NEEDED
Status: CANCELLED | OUTPATIENT
Start: 2025-07-01

## 2025-07-01 RX ORDER — EPINEPHRINE 0.3 MG/.3ML
0.3 INJECTION SUBCUTANEOUS ONCE AS NEEDED
Status: CANCELLED | OUTPATIENT
Start: 2025-07-03

## 2025-07-01 RX ORDER — DIPHENHYDRAMINE HYDROCHLORIDE 50 MG/ML
50 INJECTION, SOLUTION INTRAMUSCULAR; INTRAVENOUS ONCE AS NEEDED
Status: CANCELLED | OUTPATIENT
Start: 2025-07-03

## 2025-07-01 RX ORDER — PROCHLORPERAZINE EDISYLATE 5 MG/ML
10 INJECTION INTRAMUSCULAR; INTRAVENOUS ONCE AS NEEDED
Status: CANCELLED | OUTPATIENT
Start: 2025-07-01

## 2025-07-01 RX ORDER — PROCHLORPERAZINE EDISYLATE 5 MG/ML
10 INJECTION INTRAMUSCULAR; INTRAVENOUS ONCE AS NEEDED
Status: CANCELLED | OUTPATIENT
Start: 2025-07-03

## 2025-07-01 RX ORDER — EPINEPHRINE 0.3 MG/.3ML
0.3 INJECTION SUBCUTANEOUS ONCE AS NEEDED
Status: CANCELLED | OUTPATIENT
Start: 2025-07-02

## 2025-07-01 RX ORDER — PROCHLORPERAZINE EDISYLATE 5 MG/ML
10 INJECTION INTRAMUSCULAR; INTRAVENOUS ONCE AS NEEDED
Status: CANCELLED | OUTPATIENT
Start: 2025-07-02

## 2025-07-01 RX ORDER — HEPARIN 100 UNIT/ML
500 SYRINGE INTRAVENOUS
Status: CANCELLED | OUTPATIENT
Start: 2025-07-02

## 2025-07-01 RX ORDER — HEPARIN 100 UNIT/ML
500 SYRINGE INTRAVENOUS
Status: CANCELLED | OUTPATIENT
Start: 2025-07-03

## 2025-07-01 RX ADMIN — Medication 10 ML: at 02:07

## 2025-07-01 RX ADMIN — ETOPOSIDE 174 MG: 20 INJECTION INTRAVENOUS at 12:07

## 2025-07-01 RX ADMIN — POTASSIUM CHLORIDE 500 ML/HR: 2 INJECTION, SOLUTION, CONCENTRATE INTRAVENOUS at 12:07

## 2025-07-01 RX ADMIN — PALONOSETRON HYDROCHLORIDE 0.25 MG: 0.25 INJECTION, SOLUTION INTRAVENOUS at 09:07

## 2025-07-01 RX ADMIN — CISPLATIN 131 MG: 1 INJECTION, SOLUTION INTRAVENOUS at 11:07

## 2025-07-01 RX ADMIN — POTASSIUM CHLORIDE 500 ML/HR: 2 INJECTION, SOLUTION, CONCENTRATE INTRAVENOUS at 10:07

## 2025-07-01 RX ADMIN — APREPITANT 130 MG: 130 INJECTION, EMULSION INTRAVENOUS at 09:07

## 2025-07-02 ENCOUNTER — INFUSION (OUTPATIENT)
Facility: HOSPITAL | Age: 60
End: 2025-07-02
Payer: COMMERCIAL

## 2025-07-02 VITALS
WEIGHT: 143.31 LBS | TEMPERATURE: 98 F | HEIGHT: 64 IN | RESPIRATION RATE: 16 BRPM | HEART RATE: 65 BPM | BODY MASS INDEX: 24.46 KG/M2 | SYSTOLIC BLOOD PRESSURE: 116 MMHG | DIASTOLIC BLOOD PRESSURE: 67 MMHG | OXYGEN SATURATION: 98 %

## 2025-07-02 DIAGNOSIS — C34.91 SQUAMOUS CELL LUNG CANCER, RIGHT: ICD-10-CM

## 2025-07-02 DIAGNOSIS — C7A.1 LARGE CELL NEUROENDOCRINE CARCINOMA OF RIGHT MAIN BRONCHUS: Primary | ICD-10-CM

## 2025-07-02 PROCEDURE — 63600175 PHARM REV CODE 636 W HCPCS: Performed by: INTERNAL MEDICINE

## 2025-07-02 PROCEDURE — 96413 CHEMO IV INFUSION 1 HR: CPT

## 2025-07-02 PROCEDURE — 25000003 PHARM REV CODE 250: Performed by: INTERNAL MEDICINE

## 2025-07-02 PROCEDURE — A4216 STERILE WATER/SALINE, 10 ML: HCPCS | Performed by: INTERNAL MEDICINE

## 2025-07-02 RX ORDER — DIPHENHYDRAMINE HYDROCHLORIDE 50 MG/ML
50 INJECTION, SOLUTION INTRAMUSCULAR; INTRAVENOUS ONCE AS NEEDED
Status: DISCONTINUED | OUTPATIENT
Start: 2025-07-02 | End: 2025-07-02 | Stop reason: HOSPADM

## 2025-07-02 RX ORDER — SODIUM CHLORIDE 0.9 % (FLUSH) 0.9 %
10 SYRINGE (ML) INJECTION
Status: DISCONTINUED | OUTPATIENT
Start: 2025-07-02 | End: 2025-07-02 | Stop reason: HOSPADM

## 2025-07-02 RX ORDER — PROCHLORPERAZINE EDISYLATE 5 MG/ML
10 INJECTION INTRAMUSCULAR; INTRAVENOUS ONCE AS NEEDED
Status: DISCONTINUED | OUTPATIENT
Start: 2025-07-02 | End: 2025-07-02 | Stop reason: HOSPADM

## 2025-07-02 RX ORDER — HEPARIN 100 UNIT/ML
500 SYRINGE INTRAVENOUS
Status: DISCONTINUED | OUTPATIENT
Start: 2025-07-02 | End: 2025-07-02 | Stop reason: HOSPADM

## 2025-07-02 RX ORDER — EPINEPHRINE 0.3 MG/.3ML
0.3 INJECTION SUBCUTANEOUS ONCE AS NEEDED
Status: DISCONTINUED | OUTPATIENT
Start: 2025-07-02 | End: 2025-07-02 | Stop reason: HOSPADM

## 2025-07-02 RX ADMIN — Medication 10 ML: at 09:07

## 2025-07-02 RX ADMIN — HEPARIN SODIUM (PORCINE) LOCK FLUSH IV SOLN 100 UNIT/ML 500 UNITS: 100 SOLUTION at 09:07

## 2025-07-02 RX ADMIN — ETOPOSIDE 174 MG: 20 INJECTION INTRAVENOUS at 08:07

## 2025-07-02 RX ADMIN — SODIUM CHLORIDE: 9 INJECTION, SOLUTION INTRAVENOUS at 08:07

## 2025-07-03 ENCOUNTER — PATIENT MESSAGE (OUTPATIENT)
Dept: HEMATOLOGY/ONCOLOGY | Facility: CLINIC | Age: 60
End: 2025-07-03
Payer: COMMERCIAL

## 2025-07-03 ENCOUNTER — INFUSION (OUTPATIENT)
Facility: HOSPITAL | Age: 60
End: 2025-07-03
Payer: COMMERCIAL

## 2025-07-03 VITALS
HEIGHT: 64 IN | DIASTOLIC BLOOD PRESSURE: 75 MMHG | RESPIRATION RATE: 16 BRPM | WEIGHT: 143.31 LBS | BODY MASS INDEX: 24.46 KG/M2 | OXYGEN SATURATION: 99 % | SYSTOLIC BLOOD PRESSURE: 156 MMHG | HEART RATE: 70 BPM | TEMPERATURE: 98 F

## 2025-07-03 DIAGNOSIS — C34.91 SQUAMOUS CELL LUNG CANCER, RIGHT: ICD-10-CM

## 2025-07-03 DIAGNOSIS — C7A.1 LARGE CELL NEUROENDOCRINE CARCINOMA OF RIGHT MAIN BRONCHUS: Primary | ICD-10-CM

## 2025-07-03 PROCEDURE — 96413 CHEMO IV INFUSION 1 HR: CPT

## 2025-07-03 PROCEDURE — 96377 APPLICATON ON-BODY INJECTOR: CPT

## 2025-07-03 PROCEDURE — 25000003 PHARM REV CODE 250: Performed by: INTERNAL MEDICINE

## 2025-07-03 PROCEDURE — A4216 STERILE WATER/SALINE, 10 ML: HCPCS | Performed by: INTERNAL MEDICINE

## 2025-07-03 PROCEDURE — 96415 CHEMO IV INFUSION ADDL HR: CPT

## 2025-07-03 PROCEDURE — 63600175 PHARM REV CODE 636 W HCPCS: Mod: JZ,TB | Performed by: INTERNAL MEDICINE

## 2025-07-03 RX ORDER — SODIUM CHLORIDE 0.9 % (FLUSH) 0.9 %
10 SYRINGE (ML) INJECTION
Status: DISCONTINUED | OUTPATIENT
Start: 2025-07-03 | End: 2025-07-03 | Stop reason: HOSPADM

## 2025-07-03 RX ORDER — EPINEPHRINE 0.3 MG/.3ML
0.3 INJECTION SUBCUTANEOUS ONCE AS NEEDED
Status: DISCONTINUED | OUTPATIENT
Start: 2025-07-03 | End: 2025-07-03 | Stop reason: HOSPADM

## 2025-07-03 RX ORDER — PROCHLORPERAZINE EDISYLATE 5 MG/ML
10 INJECTION INTRAMUSCULAR; INTRAVENOUS ONCE AS NEEDED
Status: DISCONTINUED | OUTPATIENT
Start: 2025-07-03 | End: 2025-07-03 | Stop reason: HOSPADM

## 2025-07-03 RX ORDER — DIPHENHYDRAMINE HYDROCHLORIDE 50 MG/ML
50 INJECTION, SOLUTION INTRAMUSCULAR; INTRAVENOUS ONCE AS NEEDED
Status: DISCONTINUED | OUTPATIENT
Start: 2025-07-03 | End: 2025-07-03 | Stop reason: HOSPADM

## 2025-07-03 RX ORDER — HEPARIN 100 UNIT/ML
500 SYRINGE INTRAVENOUS
Status: DISCONTINUED | OUTPATIENT
Start: 2025-07-03 | End: 2025-07-03 | Stop reason: HOSPADM

## 2025-07-03 RX ADMIN — SODIUM CHLORIDE: 9 INJECTION, SOLUTION INTRAVENOUS at 09:07

## 2025-07-03 RX ADMIN — ETOPOSIDE 174 MG: 20 INJECTION, SOLUTION INTRAVENOUS at 09:07

## 2025-07-03 RX ADMIN — Medication 500 UNITS: at 10:07

## 2025-07-03 RX ADMIN — PEGFILGRASTIM 6 MG: KIT SUBCUTANEOUS at 10:07

## 2025-07-03 RX ADMIN — Medication 10 ML: at 10:07

## 2025-07-06 PROCEDURE — G0179 MD RECERTIFICATION HHA PT: HCPCS | Mod: ,,, | Performed by: THORACIC SURGERY (CARDIOTHORACIC VASCULAR SURGERY)

## 2025-07-07 ENCOUNTER — OFFICE VISIT (OUTPATIENT)
Facility: CLINIC | Age: 60
End: 2025-07-07
Payer: COMMERCIAL

## 2025-07-07 ENCOUNTER — LAB VISIT (OUTPATIENT)
Dept: LAB | Facility: HOSPITAL | Age: 60
End: 2025-07-07
Payer: COMMERCIAL

## 2025-07-07 ENCOUNTER — PATIENT MESSAGE (OUTPATIENT)
Facility: CLINIC | Age: 60
End: 2025-07-07

## 2025-07-07 VITALS
TEMPERATURE: 98 F | BODY MASS INDEX: 24.69 KG/M2 | SYSTOLIC BLOOD PRESSURE: 153 MMHG | OXYGEN SATURATION: 99 % | WEIGHT: 144.63 LBS | DIASTOLIC BLOOD PRESSURE: 73 MMHG | RESPIRATION RATE: 16 BRPM | HEART RATE: 65 BPM | HEIGHT: 64 IN

## 2025-07-07 DIAGNOSIS — C7A.1 LARGE CELL NEUROENDOCRINE CARCINOMA OF RIGHT MAIN BRONCHUS: ICD-10-CM

## 2025-07-07 DIAGNOSIS — C7A.1 LARGE CELL NEUROENDOCRINE CARCINOMA OF RIGHT MAIN BRONCHUS: Primary | ICD-10-CM

## 2025-07-07 DIAGNOSIS — C34.91 SQUAMOUS CELL LUNG CANCER, RIGHT: ICD-10-CM

## 2025-07-07 LAB
ABS NEUT CALC (OHS): 6.61 X10(3)/MCL (ref 2.1–9.2)
ALBUMIN SERPL-MCNC: 3.5 G/DL (ref 3.5–5)
ALBUMIN/GLOB SERPL: 1.2 RATIO (ref 1.1–2)
ALP SERPL-CCNC: 103 UNIT/L (ref 40–150)
ALT SERPL-CCNC: 10 UNIT/L (ref 0–55)
ANION GAP SERPL CALC-SCNC: 7 MEQ/L
AST SERPL-CCNC: 12 UNIT/L (ref 11–45)
BILIRUB SERPL-MCNC: 0.4 MG/DL
BUN SERPL-MCNC: 13 MG/DL (ref 9.8–20.1)
CALCIUM SERPL-MCNC: 8.5 MG/DL (ref 8.4–10.2)
CHLORIDE SERPL-SCNC: 108 MMOL/L (ref 98–107)
CO2 SERPL-SCNC: 26 MMOL/L (ref 22–29)
CREAT SERPL-MCNC: 0.68 MG/DL (ref 0.55–1.02)
CREAT/UREA NIT SERPL: 19
ERYTHROCYTE [DISTWIDTH] IN BLOOD BY AUTOMATED COUNT: 14.5 % (ref 11.5–17)
GFR SERPLBLD CREATININE-BSD FMLA CKD-EPI: >60 ML/MIN/1.73/M2
GLOBULIN SER-MCNC: 2.9 GM/DL (ref 2.4–3.5)
GLUCOSE SERPL-MCNC: 152 MG/DL (ref 74–100)
HCT VFR BLD AUTO: 36 % (ref 37–47)
HGB BLD-MCNC: 11.3 G/DL (ref 12–16)
LYMPHOCYTES NFR BLD MANUAL: 2.17 X10(3)/MCL (ref 0.6–4.6)
LYMPHOCYTES NFR BLD MANUAL: 24 % (ref 13–40)
MAGNESIUM SERPL-MCNC: 2.2 MG/DL (ref 1.6–2.6)
MCH RBC QN AUTO: 28.5 PG (ref 27–31)
MCHC RBC AUTO-ENTMCNC: 31.4 G/DL (ref 33–36)
MCV RBC AUTO: 90.9 FL (ref 80–94)
MONOCYTES NFR BLD MANUAL: 0.09 X10(3)/MCL (ref 0.1–1.3)
MONOCYTES NFR BLD MANUAL: 1 % (ref 2–11)
MYELOCYTES NFR BLD MANUAL: 2 %
NEUTROPHILS NFR BLD MANUAL: 71 % (ref 47–80)
NEUTS BAND NFR BLD MANUAL: 2 % (ref 0–11)
NRBC BLD AUTO-RTO: 0 %
PLATELET # BLD AUTO: 146 X10(3)/MCL (ref 130–400)
PLATELET # BLD EST: ADEQUATE 10*3/UL
PMV BLD AUTO: 10.9 FL (ref 7.4–10.4)
POTASSIUM SERPL-SCNC: 4.5 MMOL/L (ref 3.5–5.1)
PROT SERPL-MCNC: 6.4 GM/DL (ref 6.4–8.3)
RBC # BLD AUTO: 3.96 X10(6)/MCL (ref 4.2–5.4)
RBC MORPH BLD: NORMAL
SODIUM SERPL-SCNC: 141 MMOL/L (ref 136–145)
WBC # BLD AUTO: 9.05 X10(3)/MCL (ref 4.5–11.5)

## 2025-07-07 PROCEDURE — 1160F RVW MEDS BY RX/DR IN RCRD: CPT | Mod: CPTII,S$GLB,, | Performed by: NURSE PRACTITIONER

## 2025-07-07 PROCEDURE — 3077F SYST BP >= 140 MM HG: CPT | Mod: CPTII,S$GLB,, | Performed by: NURSE PRACTITIONER

## 2025-07-07 PROCEDURE — 99999 PR PBB SHADOW E&M-EST. PATIENT-LVL V: CPT | Mod: PBBFAC,,, | Performed by: NURSE PRACTITIONER

## 2025-07-07 PROCEDURE — 36415 COLL VENOUS BLD VENIPUNCTURE: CPT

## 2025-07-07 PROCEDURE — 3078F DIAST BP <80 MM HG: CPT | Mod: CPTII,S$GLB,, | Performed by: NURSE PRACTITIONER

## 2025-07-07 PROCEDURE — 3052F HG A1C>EQUAL 8.0%<EQUAL 9.0%: CPT | Mod: CPTII,S$GLB,, | Performed by: NURSE PRACTITIONER

## 2025-07-07 PROCEDURE — 85025 COMPLETE CBC W/AUTO DIFF WBC: CPT

## 2025-07-07 PROCEDURE — 3008F BODY MASS INDEX DOCD: CPT | Mod: CPTII,S$GLB,, | Performed by: NURSE PRACTITIONER

## 2025-07-07 PROCEDURE — 99215 OFFICE O/P EST HI 40 MIN: CPT | Mod: S$GLB,,, | Performed by: NURSE PRACTITIONER

## 2025-07-07 PROCEDURE — 80053 COMPREHEN METABOLIC PANEL: CPT

## 2025-07-07 PROCEDURE — 83735 ASSAY OF MAGNESIUM: CPT

## 2025-07-07 PROCEDURE — 4010F ACE/ARB THERAPY RXD/TAKEN: CPT | Mod: CPTII,S$GLB,, | Performed by: NURSE PRACTITIONER

## 2025-07-07 PROCEDURE — 1159F MED LIST DOCD IN RCRD: CPT | Mod: CPTII,S$GLB,, | Performed by: NURSE PRACTITIONER

## 2025-07-07 RX ORDER — EPINEPHRINE 0.3 MG/.3ML
0.3 INJECTION SUBCUTANEOUS ONCE AS NEEDED
OUTPATIENT
Start: 2025-07-22

## 2025-07-07 RX ORDER — PROCHLORPERAZINE EDISYLATE 5 MG/ML
10 INJECTION INTRAMUSCULAR; INTRAVENOUS ONCE AS NEEDED
OUTPATIENT
Start: 2025-07-23

## 2025-07-07 RX ORDER — DIPHENHYDRAMINE HYDROCHLORIDE 50 MG/ML
50 INJECTION, SOLUTION INTRAMUSCULAR; INTRAVENOUS ONCE AS NEEDED
OUTPATIENT
Start: 2025-07-23

## 2025-07-07 RX ORDER — DIPHENHYDRAMINE HYDROCHLORIDE 50 MG/ML
50 INJECTION, SOLUTION INTRAMUSCULAR; INTRAVENOUS ONCE AS NEEDED
OUTPATIENT
Start: 2025-07-22

## 2025-07-07 RX ORDER — PROCHLORPERAZINE EDISYLATE 5 MG/ML
10 INJECTION INTRAMUSCULAR; INTRAVENOUS ONCE AS NEEDED
OUTPATIENT
Start: 2025-07-24

## 2025-07-07 RX ORDER — DIPHENHYDRAMINE HYDROCHLORIDE 50 MG/ML
50 INJECTION, SOLUTION INTRAMUSCULAR; INTRAVENOUS ONCE AS NEEDED
OUTPATIENT
Start: 2025-07-24

## 2025-07-07 RX ORDER — EPINEPHRINE 0.3 MG/.3ML
0.3 INJECTION SUBCUTANEOUS ONCE AS NEEDED
OUTPATIENT
Start: 2025-07-24

## 2025-07-07 RX ORDER — HEPARIN 100 UNIT/ML
500 SYRINGE INTRAVENOUS
OUTPATIENT
Start: 2025-07-24

## 2025-07-07 RX ORDER — SODIUM CHLORIDE 0.9 % (FLUSH) 0.9 %
10 SYRINGE (ML) INJECTION
OUTPATIENT
Start: 2025-07-24

## 2025-07-07 RX ORDER — HEPARIN 100 UNIT/ML
500 SYRINGE INTRAVENOUS
OUTPATIENT
Start: 2025-07-22

## 2025-07-07 RX ORDER — PROCHLORPERAZINE EDISYLATE 5 MG/ML
10 INJECTION INTRAMUSCULAR; INTRAVENOUS ONCE AS NEEDED
OUTPATIENT
Start: 2025-07-22

## 2025-07-07 RX ORDER — EPINEPHRINE 0.3 MG/.3ML
0.3 INJECTION SUBCUTANEOUS ONCE AS NEEDED
OUTPATIENT
Start: 2025-07-23

## 2025-07-07 RX ORDER — SODIUM CHLORIDE 0.9 % (FLUSH) 0.9 %
10 SYRINGE (ML) INJECTION
OUTPATIENT
Start: 2025-07-22

## 2025-07-07 RX ORDER — HEPARIN 100 UNIT/ML
500 SYRINGE INTRAVENOUS
OUTPATIENT
Start: 2025-07-23

## 2025-07-07 RX ORDER — SODIUM CHLORIDE 0.9 % (FLUSH) 0.9 %
10 SYRINGE (ML) INJECTION
OUTPATIENT
Start: 2025-07-23

## 2025-07-07 NOTE — PROGRESS NOTES
HEMATOLOGY/ONCOLOGY OFFICE CLINIC VISIT    Visit Information: follow up    Initial Evaluation: 6/3/25  Referring Provider: Dr. Lewis Lees- cardiothoracic   Other providers: Dr. 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 Lewis Lees    Plan of care:     Imaging:  CT lung screening 06/28/2023 at [place]:  there is an 8.2 mm anterior pleural-based nodule in the right upper lobe.  5.7 mm semi-solid nodule in the posterior right upper lobe with surrounding interstitial prominence.  Focal scarring seen in the superior segment of the right lower lobe.  3.8 mm pleural-based nodule in the lateral aspect of the lingula.  Lung rads category 4A-suspicious abnormality.  Three-month follow-up low-dose CT recommended.  Alternatively PET-CT.  7/13/23 PET/CT @ [facility]: No PET evidence of metabolically active disease. Tiny left lower lobe nodule without abnormal uptake, however, it is too small to characterize by PET and should be monitor.  1/11/2024 CT Low Dose Lung Screening @ [facility]:Previously noted anterior right upper lobe spiculated nodule is again appreciated and is unchanged in size and appearance.  Previously seen groundglass nodule posterior right upper lobe is no longer identified.  Pleural-based nodule noted in the lingula is unchanged in size and appearance.  Lung rads category 2-benign appearance or behavior  1/16/25 CT Low Dose Lung Screening @ [facility]:  Significant changes in the appearance of the spiculated nodule in the anterior right upper lobe when compared with the 2 previous examinations.  No measures 10.3 than 19.3 mm with spiculation extending to the pleural surface anteriorly and  medially.  Associated atelectasis in the anteromedial right upper lobe.  Previously seen juxtapleural nodule in the lingula is unchanged in size and appearance. Lung rads category 4A-suspicious abnormality.  PET-CT and/or biopsy considered  3/3/25 PET/CT Oncologic's: Mild uptake has developed in the right pulmonary hilum with SUV at 3.9, which compared to the uptake in the left pulmonary hilum with peak SUV of 2.6.  Uptake measured 1.3 cm.  Hypermetabolic spiculated nodule developing the anterior medially right upper lobe measuring 1.7 cm with SUV of 8.7, highly concerning for primary lung cancer.  Impression new hypermetabolic spiculated nodule in the anteromedial right upper lobe, concerning of malignancy.  New mild uptake in the right pulmonary hilum which is somewhat concerning and should be closely monitored.  No other suspicious uptake  6/24/25 PET/CT Oncologics:  Interval postsurgical changes of right upper lobectomy without abnormal uptake to suggest metabolically-active residual/recurrent or metastatic disease  Interval resolution of mild uptake in the right pulmonary hilum  No new suspicious uptake    Pathology:  3/21/25:  10R:  Negative for malignancy.  Cellular smear showing benign bronchial epithelial cells and lymphoid tissue admixed with hemosiderin laden macrophage.    4/23/25 RUL Lobectomy:  1. LYMPH NODES, 10R, EXCISION:    - THREE LYMPH NODES, NEGATIVE FOR METASTATIC CARCINOMA.   2. LUNG, RIGHT UPPER LOBE, LOBECTOMY:    - COMBINED LARGE CELL NEUROENDOCRINE AND SQUAMOUS CELL CARCINOMA,   - ONE HILAR LYMPH NODE, NEGATIVE FOR METASTATIC CARCINOMA.   3. LYMPH NODE 11R, EXCISION:    - ONE LYMPH NODE, NEGATIVE FOR METASTATIC CARCINOMA   G3, pT2a pN0  PDL-1: 1%      [place] 5/9/2025:  CDKN2A ( Pathogenic ) p.R99P - c.296G>C Missense variant - LOFVAF: 17.1%   FGFR1 ( Pathogenic ) FGFR1 Copy number gain   PTEN ( Pathogenic ) p.E307* - c.919G>T Stop gain - LOFVAF: 25.8%   TP53 ( Pathogenic ) p.R249M -  c.746G>T Missense variant - LOF    HER2 Result NEGATIVE   Score 0         CLINICAL HISTORY:       Patient: Jocelyne Dickinson is a 60 y.o. female with history of diabetes, COPD, hypertension, hyperlipidemia, smoker (now vaping) kindly referred for newly diagnosed non-small-cell lung carcinoma.    Patient was undergoing low-dose CT scan lung cancer screening and most recent showed significant changes in a spiculated nodule in the right upper lobe.  PET-CT was performed that showed activity in that area but no distant metastases.    Patient underwent VATS right thoracotomy on 4/23/2025 by Dr. Lewis Lees.  Pathology revealed combined large cell neuroendocrine and squamous cell carcinoma.  All lymph nodes were negative for metastatic disease with a pathologic staging of 1B (T2aN0). Patient was presented at the multidisciplinary tumor board on 05/14/2025 and recommendations to repeat PET-CT or MRI and for adjuvant chemotherapy.  No role for radiation therapy.    Patient is here today and has been healing well from the surgery.  She does report some wheezing for about a with secondary to allergies.  No coughing.  No shortness of breath.  No chest pain.  No fever, chills, sweats.  She is here with her sister that has history of lung cancer.  Father had history of throat cancer.  They will also smoker.  Brother with renal cell carcinoma (no smoker).    Chief Complaint: Large cell neuroendocrine carcinoma of right main bronchus (Pt reports tinnitus, body aches, dizziness, and intermittent C. )      Interval History:  Patient presents to the visit today for a toxicity check following cycle one of chemotherapy.    Patient is undergoing treatment for large cell neuroendocrine and squamous cell carcinoma of the right lung. Treatment is a combination of Cisplatin and Etoposide. Cycle one was administered over three consecutive days starting on 07/01/2025. Today's visit is a toxicity check prior to the planned second cycle on  07/22/2025. Lab work from today shows improvement in anemia, with normal white cell count, platelet count, electrolytes, and kidney and liver function.    Reports new-onset, intermittent, bilateral tinnitus which began immediately after the first chemotherapy cycle. Reports constant dizziness, described as feeling intoxicated. Also reports constipation.    Denies diarrhea.    Current medications include Cisplatin and Etoposide for large cell neuroendocrine and squamous cell carcinoma.      Past Medical History:   Diagnosis Date    Abdominal pain     resolved    Anxiety disorder, unspecified     Carcinoma     Carotid artery stenosis     Cellulitis of scalp     resolved    Constipation     COPD (chronic obstructive pulmonary disease)     Diabetes mellitus     Digestive disorder     acid reflux    Disorder of pancreas     Disorder of pancreatic duct     Emphysema, unspecified     Family history of adverse effect to anesthesia     Fatigue     Gastrointestinal tract imaging abnormality     GERD (gastroesophageal reflux disease)     HTN (hypertension)     Insomnia     Mixed hyperlipidemia     Neuropathy     Nodule of apex of right lung     Osteoarthritis     Pancreatitis     Personal history of nicotine dependence     Pneumonia, unspecified organism     Shortness of breath     SOB (shortness of breath) on exertion     Syncope     Weight loss       Past Surgical History:   Procedure Laterality Date    BRONCHOSCOPY  03/19/2025    [facility]-    CATARACT EXTRACTION      COLONOSCOPY  07/02/2021    Dr. Nila Sharma    EGD, WITH CLOSED BIOPSY  03/12/2024    Procedure: EGD, WITH CLOSED BIOPSY;  Surgeon: Diego Atkinson MD;  Location: [facility];  Service: Gastroenterology;;  CBD 5 mm, HOP Cyst 4.5mm x 5 mm, Chronic Pancreatitis, PD Head 2.2 mm, PD Body 4.2 mm,    EYE SURGERY  2022    Cataract surgery on both eyes    INSERTION OF TUNNELED CENTRAL VENOUS CATHETER (CVC) WITH SUBCUTANEOUS PORT N/A 6/18/2025     Procedure: [id number];  Surgeon: Lewis Lees MD;  Location: [facility] OR;  Service: Cardiovascular;  Laterality: N/A;  requests 700    REPAIR OF CAROTID ARTERY      THORACOTOMY Right 4/23/2025    Procedure: THORACOTOMY;  Surgeon: Lewis Lees MD;  Location: [facility] OR;  Service: Cardiovascular;  Laterality: Right;    TONSILLECTOMY  1969    I was 4 years old when tonsils were removed    ULTRASOUND, ENDOSCOPIC, WITH FINE NEEDLE ASPIRATION N/A 03/12/2024    Procedure: UPPER EUS W/ POSS FNA;  Surgeon: Diego Atkinson MD;  Location: [facility] OR;  Service: Gastroenterology;  Laterality: N/A;  MRI abd- Limited exam due to image quality. Overall pancreatic atrophy. MAin PD dilation to 9mm in the tail with dialtied side branches. Rerlative abrupt caliber change of main PD at body and tail junction.    VIDEO-ASSISTED THORACOSCOPIC SURGERY (VATS) Right 4/23/2025    Procedure: VATS (VIDEO-ASSISTED THORACOSCOPIC SURGERY);  Surgeon: Lewis Lees MD;  Location: [facility] OR;  Service: Cardiovascular;  Laterality: Right;  RIGHT VATS, RIGHT UPPER LOBECTOMY    WRIST SURGERY Bilateral      Family History   Problem Relation Name Age of Onset    Hypertension Mother      Hypertension Father Yordy Teena     Heart disease Father Yordy Teena     Cancer Father Yordy Teena     Stroke Father Yordy Dartedenice     Cancer Maternal Grandmother      Vision loss Maternal Grandmother      Cancer Sister Maribel Dickinson Arcement 50 - 59    Cancer Brother Moustapha Dickinson 60 - 69          Review of patient's allergies indicates:   Allergen Reactions    Aller ext-american cockroach Itching and Other (See Comments)    Allerg ext-tree poll-red maple Itching and Other (See Comments)    Cat hair standardized allergenic extract Itching and Other (See Comments)    Dog hair standardized allergenic extract Itching and Other (See Comments)    Grass pollen-ruslan, standard Itching and Other (See Comments)    Horse dander Itching and Other (See Comments)     "Tramadol Itching    Tree pollen-box elder Itching and Other (See Comments)    Tree pollen-pecan Itching and Other (See Comments)    Trulicity [dulaglutide] Other (See Comments)     Patient had pancreatitis        Medications Ordered Prior to Encounter[1]   Review of Systems   Constitutional:  Negative for activity change, appetite change, chills, fatigue, fever, night sweats and unexpected weight change.   HENT:  Negative for mouth dryness, mouth sores, nosebleeds, sore throat and trouble swallowing.    Eyes: Negative.  Negative for visual disturbance.   Respiratory:  Negative for cough and shortness of breath.    Cardiovascular:  Negative for chest pain, palpitations and leg swelling.   Gastrointestinal:  Negative for abdominal distention, abdominal pain, blood in stool, change in bowel habit, constipation, diarrhea, nausea and vomiting.   Endocrine: Negative.    Genitourinary:  Negative for dysuria, frequency, hematuria and urgency.   Musculoskeletal:  Negative for arthralgias, back pain, myalgias and neck pain.   Integumentary:  Negative for rash.   Neurological:  Negative for dizziness, tremors, syncope, speech difficulty, weakness, light-headedness, numbness, headaches and memory loss.   Hematological:  Negative for adenopathy. Does not bruise/bleed easily.   Psychiatric/Behavioral:  Negative for confusion and suicidal ideas. The patient is not nervous/anxious.               Vitals:    07/07/25 1045   BP: (!) 153/73   BP Location: Right arm   Patient Position: Sitting   Pulse: 65   Resp: 16   Temp: 97.7 °F (36.5 °C)   TempSrc: Oral   SpO2: 99%   Weight: 65.6 kg (144 lb 9.6 oz)   Height: 5' 4.02" (1.626 m)        Wt Readings from Last 6 Encounters:   07/07/25 65.6 kg (144 lb 9.6 oz)   07/03/25 65 kg (143 lb 4.8 oz)   07/02/25 65 kg (143 lb 4.8 oz)   07/01/25 65.6 kg (144 lb 9.6 oz)   07/01/25 65.6 kg (144 lb 9.6 oz)   06/18/25 64.8 kg (142 lb 13.7 oz)     Body mass index is 24.81 kg/m².  Body surface area is " 1.72 meters squared.  Physical Exam  Vitals and nursing note reviewed. Exam conducted with a chaperone present.   Constitutional:       General: She is not in acute distress.     Appearance: Normal appearance. She is well-developed.   HENT:      Head: Normocephalic and atraumatic.      Mouth/Throat:      Mouth: Mucous membranes are moist.   Eyes:      General: No scleral icterus.     Extraocular Movements: Extraocular movements intact.      Conjunctiva/sclera: Conjunctivae normal.      Pupils: Pupils are equal, round, and reactive to light.   Neck:      Vascular: No JVD.   Cardiovascular:      Rate and Rhythm: Normal rate and regular rhythm.      Heart sounds: No murmur heard.  Pulmonary:      Effort: Pulmonary effort is normal.      Breath sounds: Wheezing present. No rhonchi.   Abdominal:      General: Bowel sounds are normal. There is no distension.      Palpations: Abdomen is soft. There is no mass.      Tenderness: There is no abdominal tenderness.   Musculoskeletal:         General: No swelling or deformity.      Cervical back: Neck supple.   Lymphadenopathy:      Head:      Right side of head: No submental or submandibular adenopathy.      Left side of head: No submental or submandibular adenopathy.      Cervical: No cervical adenopathy.      Lower Body: No right inguinal adenopathy. No left inguinal adenopathy.   Skin:     General: Skin is warm.      Coloration: Skin is not jaundiced.      Findings: No lesion or rash.      Nails: There is no clubbing.   Neurological:      General: No focal deficit present.      Mental Status: She is alert and oriented to person, place, and time.      Cranial Nerves: Cranial nerves 2-12 are intact.   Psychiatric:         Attention and Perception: Attention normal.         Behavior: Behavior is cooperative.         Judgment: Judgment normal.     ECOG SCORE             Laboratory:  CBC with Differential:  Lab Results   Component Value Date    WBC 9.05 07/07/2025    RBC 3.96 (L)  07/07/2025    HGB 11.3 (L) 07/07/2025    HCT 36.0 (L) 07/07/2025    MCV 90.9 07/07/2025    MCH 28.5 07/07/2025    MCHC 31.4 (L) 07/07/2025    RDW 14.5 07/07/2025     07/07/2025    MPV 10.9 (H) 07/07/2025        CMP:  Sodium   Date Value Ref Range Status   07/07/2025 141 136 - 145 mmol/L Final   04/21/2021 143 136 - 145 mmol/L Final     Potassium   Date Value Ref Range Status   07/07/2025 4.5 3.5 - 5.1 mmol/L Final   04/21/2021 4.5 3.5 - 5.1 mmol/L Final     Chloride   Date Value Ref Range Status   07/07/2025 108 (H) 98 - 107 mmol/L Final   04/21/2021 106 100 - 109 mmol/L Final     CO2   Date Value Ref Range Status   07/07/2025 26 22 - 29 mmol/L Final     Carbon Dioxide   Date Value Ref Range Status   04/21/2021 31 22 - 33 mmol/L Final     Glucose   Date Value Ref Range Status   07/07/2025 152 (H) 74 - 100 mg/dL Final     Blood Urea Nitrogen   Date Value Ref Range Status   07/07/2025 13.0 9.8 - 20.1 mg/dL Final   04/21/2021 10 7 - 18 mg/dL Final     Creatinine   Date Value Ref Range Status   07/07/2025 0.68 0.55 - 1.02 mg/dL Final   04/21/2021 0.90 0.57 - 1.25 mg/dL Final     Calcium   Date Value Ref Range Status   07/07/2025 8.5 8.4 - 10.2 mg/dL Final   04/21/2021 8.8 8.8 - 10.6 mg/dL Final     Protein Total   Date Value Ref Range Status   07/07/2025 6.4 6.4 - 8.3 gm/dL Final     Albumin   Date Value Ref Range Status   07/07/2025 3.5 3.5 - 5.0 g/dL Final     Bilirubin Total   Date Value Ref Range Status   07/07/2025 0.4 <=1.5 mg/dL Final     ALP   Date Value Ref Range Status   07/07/2025 103 40 - 150 unit/L Final     AST   Date Value Ref Range Status   07/07/2025 12 11 - 45 unit/L Final     ALT   Date Value Ref Range Status   07/07/2025 10 0 - 55 unit/L Final     Anion Gap   Date Value Ref Range Status   04/21/2021 6 (L) 8 - 16 mmol/L Final     eGFR    Date Value Ref Range Status   04/21/2021 79 >=60 mL/min/1.73mSq Final     Estimated GFR-Non    Date Value Ref Range Status    04/30/2022 58               Assessment:       No diagnosis found.      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 Lewis Lees  --NCCN guidelines for stage IB, margins neg (R0): Observe vs adjuvant systemic chemotherapy    I explained to the patient that she has a combined carcinoma.  Pathology came back as: Large cell neuroendocrine carcinoma and squamous cell carcinoma and both are subtypes of non-small cell lung cancer (NSCLC), but they differ significantly in epidemiology, biology, and therapeutic approach.   Large cell neuroendocrine carcinoma is rare but highly aggressive, accounting for approximately 3% of lung cancers.  Squamous cell carcinoma is more common, representing 20-30% of NSCLC cases.     Comprehensive molecular testing for actionable  mutations (e.g., EGFR, ALK, ROS1, BRAF) and PD-L1 expression is recommended in all advanced NSCLC, This was done and pending.   For large cell neuroendocrine carcinoma, treatment is stage-dependent. In early-stage (I-III) disease, surgical resection is preferred for operable patients. However, due to the high risk of recurrence, adjuvant platinum-etoposide chemotherapy is recommended even for stage I disease.    Discussed briefly chemotherapy, risks versus benefits as well as toxicities associated with it.  Educated the patient on the risks versus benefits as well as toxicities associated with treatment.  Verbally consented the patient to the treatment plan and the patient was educated on the planned duration of the treatment and schedule of the treatment administration.  All questions were answered.        Plan:       - Defer second cycle of chemotherapy due to concern for ototoxicity until ENT evaluation is completed.  - Refer to ENT for audiometric evaluation prior to proceeding with further chemotherapy.  - Provider will contact patient's ENT,   Drew aGrdiner, to arrange an urgent appointment.  - Patient will also call Dr. Gardiner's office to schedule an urgent appointment before 07/20/2025.  - Patient to complete lab work on 07/21/2025.  - Follow-up appointment scheduled for 07/22/2025, prior to potential treatment, to review ENT evaluation and lab results.  - Labs reviewed and unremarkable today with a mind anemia  -  Cisplatin/Etoposide D1,2,3 q3w x 4-6 cycles  - Continue follow up with Dr. Lees  - Repeat PET/CT after C2    Patient instructions and visit orders.       -Follow-up:  F/U with NP 7/22/2025  -Labs:  CBC, CMP, Mg- prior to appt. OK to do at [facility].   -Imaging:    -Other orders:  Treatment Cisplatin/Etoposide D1,2,3 Starting 7/22,23 &24  -Refer tp ENT, she is established with Dr. Vogel as soon as possible    40 were spent on this encounter    Patient requires or will require continuous, longitudinal, and active collaborative plan of care related to this patient's health condition, Combined LCNEC - the management of which requires the direction of a practitioner with specialized clinical knowledge, skill, and expertise.      Horacio Cat MSN, FNP-BC, AOCNP  Department of Hematology/Oncology.                   [1]   Current Outpatient Medications on File Prior to Visit   Medication Sig Dispense Refill    acetaminophen (TYLENOL) 500 MG tablet Take 1,000 mg by mouth nightly as needed for Pain.      albuterol (PROVENTIL/VENTOLIN HFA) 90 mcg/actuation inhaler inhale TWO puffs EVERY 4 TO 6 HOURS AS NEEDED FOR wheezing 6.7 g 6    ALPRAZolam (XANAX) 0.5 MG tablet Take 1 tablet (0.5 mg total) by mouth 2 (two) times daily. 60 tablet 5    atorvastatin (LIPITOR) 40 MG tablet Take 40 mg by mouth once daily.      azelastine (ASTELIN) 137 mcg (0.1 %) nasal spray 1 spray by Nasal route daily as needed for Rhinitis.      b complex vitamins tablet Take 1 tablet by mouth once daily.      blood-glucose meter,continuous (DEXCOM G7 ) Misc 1 each by  Misc.(Non-Drug; Combo Route) route continuous. 1 each 0    blood-glucose sensor (DEXCOM G7 SENSOR) Kristen 1 each by Misc.(Non-Drug; Combo Route) route every 10 days. 5 each 11    budesonide-formoterol 160-4.5 mcg (SYMBICORT) 160-4.5 mcg/actuation HFAA INHALE TWO PUFFS TWICE A DAY 10.2 g 6    citalopram (CELEXA) 10 MG tablet TAKE ONE TABLET BY MOUTH ONCE DAILY WITH 20 MG 30 tablet 5    citalopram (CELEXA) 20 MG tablet Take 1 tablet (20 mg total) by mouth once daily. 30 tablet 5    clopidogreL (PLAVIX) 75 mg tablet TAKE ONE TABLET BY MOUTH DAILY 30 tablet 4    dexAMETHasone (DECADRON) 4 MG Tab Take 2 tablets (8 mg total) by mouth once daily. on days 2-4 of each chemotherapy cycle. 6 tablet 5    docusate sodium (COLACE) 100 MG capsule Take 1 capsule (100 mg total) by mouth 2 (two) times daily. 60 capsule 5    esomeprazole (NEXIUM) 40 MG capsule TAKE ONE CAPSULE BY MOUTH BEFORE breakfast 30 capsule 9    gabapentin (NEURONTIN) 300 MG capsule TAKE ONE CAPSULE BY MOUTH EVERY EVENING 30 capsule 11    glimepiride (AMARYL) 2 MG tablet TAKE ONE TABLET BY MOUTH TWICE DAILY FOR DIABETES 180 tablet 5    hydrocortisone 2.5 % cream SMARTSIG:sparingly Topical 3 Times Daily      hydrOXYzine pamoate (VISTARIL) 25 MG Cap TAKE ONE CAPSULE BY MOUTH DAILY 30 capsule 5    ibuprofen (ADVIL,MOTRIN) 800 MG tablet Take 800 mg by mouth daily as needed for Pain.      insulin aspart U-100 (NOVOLOG FLEXPEN U-100 INSULIN) 100 unit/mL (3 mL) InPn pen Inject 6 Units into the skin 3 (three) times daily with meals. 5.4 mL 11    insulin glargine U-100, Lantus, (LANTUS SOLOSTAR U-100 INSULIN) 100 unit/mL (3 mL) InPn pen Inject 30 Units into the skin once daily.      linaCLOtide (LINZESS) 145 mcg Cap capsule Take 145 mcg by mouth before breakfast.      losartan (COZAAR) 100 MG tablet TAKE 1 TABLET BY MOUTH DAILY FOR BLOOD PRESSURE 30 tablet 5    mirtazapine (REMERON) 15 MG tablet TAKE 1/2 TABLET BY MOUTH DAILY EVERY EVENING 15 tablet 11    NIFEdipine  "(PROCARDIA-XL) 60 MG (OSM) 24 hr tablet TAKE ONE TABLET BY MOUTH DAILY FOR BLOOD PRESSURE 30 tablet 5    OLANZapine (ZYPREXA) 5 MG tablet Take 1 tablet (5 mg total) by mouth every evening. on days 1-4 of each chemotherapy cycle. 4 tablet 5    pen needle, diabetic 31 gauge x 3/16" Ndle 1 Pen by Misc.(Non-Drug; Combo Route) route Daily. 100 each 1    pioglitazone (ACTOS) 30 MG tablet TAKE ONE TABLET BY MOUTH DAILY 30 tablet 2    prochlorperazine (COMPAZINE) 5 MG tablet Take 2 tablets (10 mg total) by mouth every 6 (six) hours as needed for Nausea. 20 tablet 5    SPIRIVA WITH HANDIHALER 18 mcg inhalation capsule Inhale 1 capsule into the lungs Daily.      sucralfate (CARAFATE) 100 mg/mL suspension Take 10 mLs (1 g total) by mouth as needed. 414 mL 6    TRADJENTA 5 mg Tab tablet TAKE ONE TABLET BY MOUTH DAILY 30 tablet 5     No current facility-administered medications on file prior to visit.     "

## 2025-07-08 DIAGNOSIS — E11.65 TYPE 2 DIABETES MELLITUS WITH HYPERGLYCEMIA, WITHOUT LONG-TERM CURRENT USE OF INSULIN: ICD-10-CM

## 2025-07-08 RX ORDER — PIOGLITAZONE 30 MG/1
30 TABLET ORAL
Qty: 30 TABLET | Refills: 2 | Status: SHIPPED | OUTPATIENT
Start: 2025-07-08

## 2025-07-10 ENCOUNTER — PATIENT MESSAGE (OUTPATIENT)
Facility: CLINIC | Age: 60
End: 2025-07-10
Payer: COMMERCIAL

## 2025-07-10 DIAGNOSIS — M54.2 NECK PAIN: ICD-10-CM

## 2025-07-10 DIAGNOSIS — M62.838 MUSCLE SPASMS OF NECK: Primary | ICD-10-CM

## 2025-07-10 DIAGNOSIS — C7A.1 LARGE CELL NEUROENDOCRINE CARCINOMA OF RIGHT MAIN BRONCHUS: ICD-10-CM

## 2025-07-10 DIAGNOSIS — C34.91 SQUAMOUS CELL LUNG CANCER, RIGHT: ICD-10-CM

## 2025-07-10 RX ORDER — CYCLOBENZAPRINE HYDROCHLORIDE 7.5 MG/1
7.5 TABLET, FILM COATED ORAL 3 TIMES DAILY PRN
Qty: 30 TABLET | Refills: 0 | Status: SHIPPED | OUTPATIENT
Start: 2025-07-10 | End: 2025-07-20

## 2025-07-14 ENCOUNTER — HOSPITAL ENCOUNTER (OUTPATIENT)
Dept: RADIOLOGY | Facility: HOSPITAL | Age: 60
Discharge: HOME | End: 2025-07-14
Payer: COMMERCIAL

## 2025-07-14 DIAGNOSIS — C7A.1 LARGE CELL NEUROENDOCRINE CARCINOMA OF RIGHT MAIN BRONCHUS: ICD-10-CM

## 2025-07-14 DIAGNOSIS — C34.91 SQUAMOUS CELL LUNG CANCER, RIGHT: ICD-10-CM

## 2025-07-14 DIAGNOSIS — M54.2 NECK PAIN: ICD-10-CM

## 2025-07-14 DIAGNOSIS — M62.838 MUSCLE SPASMS OF NECK: ICD-10-CM

## 2025-07-14 PROCEDURE — 72156 MRI NECK SPINE W/O & W/DYE: CPT | Mod: TC

## 2025-07-14 PROCEDURE — 25500020 PHARM REV CODE 255

## 2025-07-14 PROCEDURE — A9577 INJ MULTIHANCE: HCPCS

## 2025-07-14 RX ADMIN — GADOBENATE DIMEGLUMINE 13 ML: 529 INJECTION, SOLUTION INTRAVENOUS at 10:07

## 2025-07-15 ENCOUNTER — TELEPHONE (OUTPATIENT)
Facility: CLINIC | Age: 60
End: 2025-07-15
Payer: COMMERCIAL

## 2025-07-15 NOTE — TELEPHONE ENCOUNTER
MRI ordered due to increased muscle spasms in neck. Results reviewed with her in detail, no evidence of metastatic disease. She declines referral to ortho or neurosurgery at this times as her symptoms are improving, but is open to it in the future if symptoms return

## 2025-07-16 ENCOUNTER — EXTERNAL HOME HEALTH (OUTPATIENT)
Dept: HOME HEALTH SERVICES | Facility: HOSPITAL | Age: 60
End: 2025-07-16
Payer: COMMERCIAL

## 2025-07-20 ENCOUNTER — TELEPHONE (OUTPATIENT)
Dept: PHARMACY | Facility: CLINIC | Age: 60
End: 2025-07-20
Payer: COMMERCIAL

## 2025-07-20 NOTE — TELEPHONE ENCOUNTER
[facility]/Population Health Chart Review & Patient Outreach Details For Medication Adherence Project    Reason for Outreach Encounter: 3rd Party payor non-compliance report ([employer], [employer], [employer], etc)  2.  Patient Outreach Method: Reviewed patient chart   3.   Medication in question:    Hypertension Medications              losartan (COZAAR) 100 MG tablet TAKE 1 TABLET BY MOUTH DAILY FOR BLOOD PRESSURE    NIFEdipine (PROCARDIA-XL) 60 MG (OSM) 24 hr tablet TAKE ONE TABLET BY MOUTH DAILY FOR BLOOD PRESSURE                 Losartan  last filled  7/8/25 for 30 day supply      4.  Reviewed and or Updates Made To: Patient Chart  5. Outreach Outcomes and/or actions taken: Patient filled medication and is on track to be adherent  Additional Notes:

## 2025-07-21 ENCOUNTER — TELEPHONE (OUTPATIENT)
Dept: PHARMACY | Facility: CLINIC | Age: 60
End: 2025-07-21
Payer: COMMERCIAL

## 2025-07-21 NOTE — PROGRESS NOTES
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 )       Initial Evaluation: 6/3/25  Referring Provider: Dr. Lewis Lees- cardiothoracic   Other providers: Dr. 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 Lewis Lees    Plan of care:     Imaging:  CT lung screening 06/28/2023 at [place]:  there is an 8.2 mm anterior pleural-based nodule in the right upper lobe.  5.7 mm semi-solid nodule in the posterior right upper lobe with surrounding interstitial prominence.  Focal scarring seen in the superior segment of the right lower lobe.  3.8 mm pleural-based nodule in the lateral aspect of the lingula.  Lung rads category 4A-suspicious abnormality.  Three-month follow-up low-dose CT recommended.  Alternatively PET-CT.  7/13/23 PET/CT @ [facility] [place]: No PET evidence of metabolically active disease. Tiny left lower lobe nodule without abnormal uptake, however, it is too small to characterize by PET and should be monitor.  1/11/2024 CT Low Dose Lung Screening @ [facility]:Previously noted anterior right upper lobe spiculated nodule is again appreciated and is unchanged in size and appearance.  Previously seen groundglass nodule posterior right upper lobe is no longer identified.  Pleural-based nodule noted in the lingula is unchanged in size and appearance.  Lung rads category 2-benign appearance or behavior  1/16/25 CT Low Dose Lung Screening @ [facility]:  Significant changes in the appearance of the spiculated nodule in the anterior right upper lobe when compared with the 2 previous examinations.  No measures  10.3 than 19.3 mm with spiculation extending to the pleural surface anteriorly and medially.  Associated atelectasis in the anteromedial right upper lobe.  Previously seen juxtapleural nodule in the lingula is unchanged in size and appearance. Lung rads category 4A-suspicious abnormality.  PET-CT and/or biopsy considered  3/3/25 PET/CT [facility]: Mild uptake has developed in the right pulmonary hilum with SUV at 3.9, which compared to the uptake in the left pulmonary hilum with peak SUV of 2.6.  Uptake measured 1.3 cm.  Hypermetabolic spiculated nodule developing the anterior medially right upper lobe measuring 1.7 cm with SUV of 8.7, highly concerning for primary lung cancer.  Impression new hypermetabolic spiculated nodule in the anteromedial right upper lobe, concerning of malignancy.  New mild uptake in the right pulmonary hilum which is somewhat concerning and should be closely monitored.  No other suspicious uptake  6/24/25 PET/CT [facility]:  Interval postsurgical changes of right upper lobectomy without abnormal uptake to suggest metabolically-active residual/recurrent or metastatic disease  Interval resolution of mild uptake in the right pulmonary hilum  No new suspicious uptake    Pathology:  3/21/25:  10R:  Negative for malignancy.  Cellular smear showing benign bronchial epithelial cells and lymphoid tissue admixed with hemosiderin laden macrophage.    4/23/25 RUL Lobectomy:  1. LYMPH NODES, 10R, EXCISION:    - THREE LYMPH NODES, NEGATIVE FOR METASTATIC CARCINOMA.   2. LUNG, RIGHT UPPER LOBE, LOBECTOMY:    - COMBINED LARGE CELL NEUROENDOCRINE AND SQUAMOUS CELL CARCINOMA,   - ONE HILAR LYMPH NODE, NEGATIVE FOR METASTATIC CARCINOMA.   3. LYMPH NODE 11R, EXCISION:    - ONE LYMPH NODE, NEGATIVE FOR METASTATIC CARCINOMA   G3, pT2a pN0  PDL-1: 1%      [place] 5/9/2025:  CDKN2A ( Pathogenic ) p.R99P - c.296G>C Missense variant - LOFVAF: 17.1%   FGFR1 ( Pathogenic ) FGFR1 Copy number gain   PTEN ( Pathogenic )  p.E307* - c.919G>T Stop gain - LOFVAF: 25.8%   TP53 ( Pathogenic ) p.R249M - c.746G>T Missense variant - LOF    HER2 Result NEGATIVE   Score 0         CLINICAL HISTORY:       Patient: Jocelyne Dickinson is a 60 y.o. female with history of diabetes, COPD, hypertension, hyperlipidemia, smoker (now vaping) kindly referred for newly diagnosed non-small-cell lung carcinoma.    Patient was undergoing low-dose CT scan lung cancer screening and most recent showed significant changes in a spiculated nodule in the right upper lobe.  PET-CT was performed that showed activity in that area but no distant metastases.    Patient underwent VATS right thoracotomy on 4/23/2025 by Dr. Lewis Lees.  Pathology revealed combined large cell neuroendocrine and squamous cell carcinoma.  All lymph nodes were negative for metastatic disease with a pathologic staging of 1B (T2aN0). Patient was presented at the multidisciplinary tumor board on 05/14/2025 and recommendations to repeat PET-CT or MRI and for adjuvant chemotherapy.  No role for radiation therapy.    Patient is here today and has been healing well from the surgery.  She does report some wheezing for about a with secondary to allergies.  No coughing.  No shortness of breath.  No chest pain.  No fever, chills, sweats.  She is here with her sister that has history of lung cancer.  Father had history of throat cancer.  They will also smoker.  Brother with renal cell carcinoma (no smoker).    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 )      Interval History:  She return to the clinic today for a follow-up and treatment clearance for C2 of Cisplatin/etoposide.   She feels well today.  Muscle spasms to neck controlled with flexeril as needed.   She was seen by ENT with no abnormalities noted. She has an appt scheduled for her audiogram on 7/30/2025.   Discussed pt with Dr. Powers in detail. Will continue to hold at this time until after  audiogram is completed, but will switch from cisplatin to carbo due to ototoxicity.   She denies any SOB, CP, headache, rash, neuropathy, fever, or chills.     Past Medical History:   Diagnosis Date    Abdominal pain     resolved    Anxiety disorder, unspecified     Carcinoma     Carotid artery stenosis     Cellulitis of scalp     resolved    Constipation     COPD (chronic obstructive pulmonary disease)     Diabetes mellitus     Digestive disorder     acid reflux    Disorder of pancreas     Disorder of pancreatic duct     Emphysema, unspecified     Family history of adverse effect to anesthesia     Fatigue     Gastrointestinal tract imaging abnormality     GERD (gastroesophageal reflux disease)     HTN (hypertension)     Insomnia     Mixed hyperlipidemia     Neuropathy     Nodule of apex of right lung     Osteoarthritis     Pancreatitis     Personal history of nicotine dependence     Pneumonia, unspecified organism     Shortness of breath     SOB (shortness of breath) on exertion     Syncope     Weight loss       Past Surgical History:   Procedure Laterality Date    BRONCHOSCOPY  03/19/2025    [facility]-    CATARACT EXTRACTION      COLONOSCOPY  07/02/2021    Dr. Nila Sharma    EGD, WITH CLOSED BIOPSY  03/12/2024    Procedure: EGD, WITH CLOSED BIOPSY;  Surgeon: Diego Atkinson MD;  Location: [facility];  Service: Gastroenterology;;  CBD 5 mm, HOP Cyst 4.5mm x 5 mm, Chronic Pancreatitis, PD Head 2.2 mm, PD Body 4.2 mm,    EYE SURGERY  2022    Cataract surgery on both eyes    INSERTION OF TUNNELED CENTRAL VENOUS CATHETER (CVC) WITH SUBCUTANEOUS PORT N/A 6/18/2025    Procedure: [id number];  Surgeon: Lewis Lees MD;  Location: [facility] OR;  Service: Cardiovascular;  Laterality: N/A;  requests 700    REPAIR OF CAROTID ARTERY      THORACOTOMY Right 4/23/2025    Procedure: THORACOTOMY;  Surgeon: Lewis Lees MD;  Location: [facility] OR;  Service: Cardiovascular;  Laterality: Right;     TONSILLECTOMY  1969    I was 4 years old when tonsils were removed    ULTRASOUND, ENDOSCOPIC, WITH FINE NEEDLE ASPIRATION N/A 03/12/2024    Procedure: UPPER EUS W/ POSS FNA;  Surgeon: Diego Atkinson MD;  Location: [facility] OR;  Service: Gastroenterology;  Laterality: N/A;  MRI abd- Limited exam due to image quality. Overall pancreatic atrophy. MAin PD dilation to 9mm in the tail with dialtied side branches. Rerlative abrupt caliber change of main PD at body and tail junction.    VIDEO-ASSISTED THORACOSCOPIC SURGERY (VATS) Right 4/23/2025    Procedure: VATS (VIDEO-ASSISTED THORACOSCOPIC SURGERY);  Surgeon: Lewis Lees MD;  Location: [facility] OR;  Service: Cardiovascular;  Laterality: Right;  RIGHT VATS, RIGHT UPPER LOBECTOMY    WRIST SURGERY Bilateral      Family History   Problem Relation Name Age of Onset    Hypertension Mother      Hypertension Father Yordy Teena     Heart disease Father Yordy Teena     Cancer Father Yordy Teena     Stroke Father Yordy Markelltedenice     Cancer Maternal Grandmother      Vision loss Maternal Grandmother      Cancer Sister Maribel Dickinson Arcement 50 - 59    Cancer Brother Moustapha Dickinson 60 - 69          Review of patient's allergies indicates:   Allergen Reactions    Aller ext-american cockroach Itching and Other (See Comments)    Allerg ext-tree poll-red maple Itching and Other (See Comments)    Cat hair standardized allergenic extract Itching and Other (See Comments)    Dog hair standardized allergenic extract Itching and Other (See Comments)    Grass pollen-ruslan, standard Itching and Other (See Comments)    Horse dander Itching and Other (See Comments)    Tramadol Itching    Tree pollen-box elder Itching and Other (See Comments)    Tree pollen-pecan Itching and Other (See Comments)    Trulicity [dulaglutide] Other (See Comments)     Patient had pancreatitis        Medications Ordered Prior to Encounter[1]   Review of Systems   Constitutional:  Negative for activity change,  "appetite change, chills, fatigue, fever, night sweats and unexpected weight change.   HENT:  Positive for hearing loss and tinnitus. Negative for mouth dryness, mouth sores, nosebleeds, sore throat and trouble swallowing.    Eyes: Negative.  Negative for visual disturbance.   Respiratory:  Negative for cough and shortness of breath.    Cardiovascular:  Negative for chest pain, palpitations and leg swelling.   Gastrointestinal:  Negative for abdominal distention, abdominal pain, blood in stool, change in bowel habit, constipation, diarrhea, nausea and vomiting.   Endocrine: Negative.    Genitourinary:  Negative for dysuria, frequency, hematuria and urgency.   Musculoskeletal:  Negative for arthralgias, back pain, myalgias and neck pain.   Integumentary:  Negative for rash.   Neurological:  Negative for dizziness, tremors, syncope, speech difficulty, weakness, light-headedness, numbness, headaches and memory loss.   Hematological:  Negative for adenopathy. Does not bruise/bleed easily.   Psychiatric/Behavioral:  Negative for confusion and suicidal ideas. The patient is not nervous/anxious.               Vitals:    07/22/25 0810   BP: 127/68   BP Location: Right arm   Patient Position: Sitting   Pulse: 77   Resp: 16   Temp: 98.3 °F (36.8 °C)   TempSrc: Oral   SpO2: 98%   Weight: 66.5 kg (146 lb 11.2 oz)   Height: 5' 4.02" (1.626 m)          Wt Readings from Last 6 Encounters:   07/22/25 66.5 kg (146 lb 11.2 oz)   07/07/25 65.6 kg (144 lb 9.6 oz)   07/03/25 65 kg (143 lb 4.8 oz)   07/02/25 65 kg (143 lb 4.8 oz)   07/01/25 65.6 kg (144 lb 9.6 oz)   07/01/25 65.6 kg (144 lb 9.6 oz)     Body mass index is 25.17 kg/m².  Body surface area is 1.73 meters squared.  Physical Exam  Vitals and nursing note reviewed. Exam conducted with a chaperone present.   Constitutional:       General: She is not in acute distress.     Appearance: Normal appearance. She is well-developed.   HENT:      Head: Normocephalic and atraumatic.      " Mouth/Throat:      Mouth: Mucous membranes are moist.   Eyes:      General: No scleral icterus.     Extraocular Movements: Extraocular movements intact.      Conjunctiva/sclera: Conjunctivae normal.      Pupils: Pupils are equal, round, and reactive to light.   Neck:      Vascular: No JVD.   Cardiovascular:      Rate and Rhythm: Normal rate and regular rhythm.      Heart sounds: No murmur heard.  Pulmonary:      Effort: Pulmonary effort is normal.      Breath sounds: No wheezing or rhonchi.   Abdominal:      General: Bowel sounds are normal. There is no distension.      Palpations: Abdomen is soft. There is no mass.      Tenderness: There is no abdominal tenderness.   Musculoskeletal:         General: No swelling or deformity.      Cervical back: Neck supple.   Lymphadenopathy:      Head:      Right side of head: No submental or submandibular adenopathy.      Left side of head: No submental or submandibular adenopathy.      Cervical: No cervical adenopathy.      Lower Body: No right inguinal adenopathy. No left inguinal adenopathy.   Skin:     General: Skin is warm.      Coloration: Skin is not jaundiced.      Findings: No lesion or rash.      Nails: There is no clubbing.   Neurological:      General: No focal deficit present.      Mental Status: She is alert and oriented to person, place, and time.      Cranial Nerves: Cranial nerves 2-12 are intact.   Psychiatric:         Attention and Perception: Attention normal.         Behavior: Behavior is cooperative.         Judgment: Judgment normal.       ECOG SCORE    1 - Restricted in strenuous activity-ambulatory and able to carry out work of a light nature         Laboratory:  CBC with Differential:  Lab Results   Component Value Date    WBC 6.76 07/22/2025    RBC 3.29 (L) 07/22/2025    HGB 9.5 (L) 07/22/2025    HCT 29.8 (L) 07/22/2025    MCV 90.6 07/22/2025    MCH 28.9 07/22/2025    MCHC 31.9 (L) 07/22/2025    RDW 15.4 07/22/2025     07/22/2025    MPV 9.8  07/22/2025        CMP:  Sodium   Date Value Ref Range Status   07/22/2025 140 136 - 145 mmol/L Final   04/21/2021 143 136 - 145 mmol/L Final     Potassium   Date Value Ref Range Status   07/22/2025 4.6 3.5 - 5.1 mmol/L Final   04/21/2021 4.5 3.5 - 5.1 mmol/L Final     Chloride   Date Value Ref Range Status   07/22/2025 110 (H) 98 - 107 mmol/L Final   04/21/2021 106 100 - 109 mmol/L Final     CO2   Date Value Ref Range Status   07/22/2025 24 22 - 29 mmol/L Final     Carbon Dioxide   Date Value Ref Range Status   04/21/2021 31 22 - 33 mmol/L Final     Glucose   Date Value Ref Range Status   07/22/2025 359 (H) 74 - 100 mg/dL Final     Blood Urea Nitrogen   Date Value Ref Range Status   07/22/2025 12.0 9.8 - 20.1 mg/dL Final   04/21/2021 10 7 - 18 mg/dL Final     Creatinine   Date Value Ref Range Status   07/22/2025 0.80 0.55 - 1.02 mg/dL Final   04/21/2021 0.90 0.57 - 1.25 mg/dL Final     Calcium   Date Value Ref Range Status   07/22/2025 8.1 (L) 8.4 - 10.2 mg/dL Final   04/21/2021 8.8 8.8 - 10.6 mg/dL Final     Protein Total   Date Value Ref Range Status   07/22/2025 5.6 (L) 6.4 - 8.3 gm/dL Final     Albumin   Date Value Ref Range Status   07/22/2025 3.0 (L) 3.5 - 5.0 g/dL Final     Bilirubin Total   Date Value Ref Range Status   07/22/2025 0.2 <=1.5 mg/dL Final     ALP   Date Value Ref Range Status   07/22/2025 115 40 - 150 unit/L Final     AST   Date Value Ref Range Status   07/22/2025 9 (L) 11 - 45 unit/L Final     ALT   Date Value Ref Range Status   07/22/2025 8 0 - 55 unit/L Final     Anion Gap   Date Value Ref Range Status   04/21/2021 6 (L) 8 - 16 mmol/L Final     eGFR    Date Value Ref Range Status   04/21/2021 79 >=60 mL/min/1.73mSq Final     Estimated GFR-Non    Date Value Ref Range Status   04/30/2022 58               Assessment:       1. 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 Squamous cell lung cancer, right    3. Large cell neuroendocrine carcinoma     4. Malignant neoplasm of unspecified part of unspecified bronchus or lung    5. Squamous cell carcinoma of right lung    6. 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 Lewis Lees  --NCCN guidelines for stage IB, margins neg (R0): Observe vs adjuvant systemic chemotherapy  I explained to the patient that she has a combined carcinoma.  Pathology came back as: Large cell neuroendocrine carcinoma and squamous cell carcinoma and both are subtypes of non-small cell lung cancer (NSCLC), but they differ significantly in epidemiology, biology, and therapeutic approach.   Large cell neuroendocrine carcinoma is rare but highly aggressive, accounting for approximately 3% of lung cancers.  Squamous cell carcinoma is more common, representing 20-30% of NSCLC cases.   Comprehensive molecular testing for actionable  mutations (e.g., EGFR, ALK, ROS1, BRAF) and PD-L1 expression is recommended in all advanced NSCLC, This was done and pending.   For large cell neuroendocrine carcinoma, treatment is stage-dependent. In early-stage (I-III) disease, surgical resection is preferred for operable patients. However, due to the high risk of recurrence, adjuvant platinum-etoposide chemotherapy is recommended even for stage I disease.  She was started on cisplatin/etoposide. Discussed with Dr. Powers. Due to ocular toxicities, will hold any further treatment and proceed with carboplatin with AUC of 6 following audiogram for baseline.         Plan:   Labs reviewed, stable.  Hold treatment today until after audiogram.  Cisplatin switched to carboplatin with AUC 6.  Continued ocular toxicity reviewed in detail. She verbalized understanding.   Audiogram scheduled for 7/30/2025  Continue to follow ENT, Dr. Gardiner  Continue with Dr. Lees.  Repeat PET following C2  RTC 7/31/2025 audio only with NP for FU  on audiogram   No labs needed    SD Quevedo  Oncology/Hematology   Cancer Center [facility] personally reviewed all pertinent imaging, lab results, explained to the patient the pertinent information in detail including radiographic imaging, abnormal lab results. All questions were answered thoroughly. Total time spent on this visit was >40 minutes.     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                        [1]   Current Outpatient Medications on File Prior to Visit   Medication Sig Dispense Refill    acetaminophen (TYLENOL) 500 MG tablet Take 1,000 mg by mouth nightly as needed for Pain.      albuterol (PROVENTIL/VENTOLIN HFA) 90 mcg/actuation inhaler inhale TWO puffs EVERY 4 TO 6 HOURS AS NEEDED FOR wheezing 6.7 g 6    ALPRAZolam (XANAX) 0.5 MG tablet Take 1 tablet (0.5 mg total) by mouth 2 (two) times daily. 60 tablet 5    atorvastatin (LIPITOR) 40 MG tablet Take 40 mg by mouth once daily.      azelastine (ASTELIN) 137 mcg (0.1 %) nasal spray 1 spray by Nasal route daily as needed for Rhinitis.      b complex vitamins tablet Take 1 tablet by mouth once daily.      blood-glucose meter,continuous (DEXCOM G7 ) Misc 1 each by Misc.(Non-Drug; Combo Route) route continuous. 1 each 0    blood-glucose sensor (DEXCOM G7 SENSOR) Kristen 1 each by Misc.(Non-Drug; Combo Route) route every 10 days. 5 each 11    budesonide-formoterol 160-4.5 mcg (SYMBICORT) 160-4.5 mcg/actuation HFAA INHALE TWO PUFFS TWICE A DAY 10.2 g 6    citalopram (CELEXA) 10 MG tablet TAKE ONE TABLET BY MOUTH ONCE DAILY WITH 20 MG 30 tablet 5    citalopram (CELEXA) 20 MG tablet Take 1 tablet  "(20 mg total) by mouth once daily. 30 tablet 5    clopidogreL (PLAVIX) 75 mg tablet TAKE ONE TABLET BY MOUTH DAILY 30 tablet 4    dexAMETHasone (DECADRON) 4 MG Tab Take 2 tablets (8 mg total) by mouth once daily. on days 2-4 of each chemotherapy cycle. 6 tablet 5    docusate sodium (COLACE) 100 MG capsule Take 1 capsule (100 mg total) by mouth 2 (two) times daily. 60 capsule 5    esomeprazole (NEXIUM) 40 MG capsule TAKE ONE CAPSULE BY MOUTH BEFORE breakfast 30 capsule 9    gabapentin (NEURONTIN) 300 MG capsule TAKE ONE CAPSULE BY MOUTH EVERY EVENING 30 capsule 11    glimepiride (AMARYL) 2 MG tablet TAKE ONE TABLET BY MOUTH TWICE DAILY FOR DIABETES 180 tablet 5    hydrocortisone 2.5 % cream SMARTSIG:sparingly Topical 3 Times Daily      hydrOXYzine pamoate (VISTARIL) 25 MG Cap TAKE ONE CAPSULE BY MOUTH DAILY 30 capsule 5    ibuprofen (ADVIL,MOTRIN) 800 MG tablet Take 800 mg by mouth daily as needed for Pain.      insulin aspart U-100 (NOVOLOG FLEXPEN U-100 INSULIN) 100 unit/mL (3 mL) InPn pen Inject 6 Units into the skin 3 (three) times daily with meals. 5.4 mL 11    insulin glargine U-100, Lantus, (LANTUS SOLOSTAR U-100 INSULIN) 100 unit/mL (3 mL) InPn pen Inject 30 Units into the skin once daily.      linaCLOtide (LINZESS) 145 mcg Cap capsule Take 145 mcg by mouth before breakfast.      losartan (COZAAR) 100 MG tablet TAKE 1 TABLET BY MOUTH DAILY FOR BLOOD PRESSURE 30 tablet 5    mirtazapine (REMERON) 15 MG tablet TAKE 1/2 TABLET BY MOUTH DAILY EVERY EVENING 15 tablet 11    NIFEdipine (PROCARDIA-XL) 60 MG (OSM) 24 hr tablet TAKE ONE TABLET BY MOUTH DAILY FOR BLOOD PRESSURE 30 tablet 5    OLANZapine (ZYPREXA) 5 MG tablet Take 1 tablet (5 mg total) by mouth every evening. on days 1-4 of each chemotherapy cycle. 4 tablet 5    pen needle, diabetic 31 gauge x 3/16" Ndle 1 Pen by Misc.(Non-Drug; Combo Route) route Daily. 100 each 1    pioglitazone (ACTOS) 30 MG tablet TAKE ONE TABLET BY MOUTH DAILY 30 tablet 2    " prochlorperazine (COMPAZINE) 5 MG tablet Take 2 tablets (10 mg total) by mouth every 6 (six) hours as needed for Nausea. 20 tablet 5    SPIRIVA WITH HANDIHALER 18 mcg inhalation capsule Inhale 1 capsule into the lungs Daily.      sucralfate (CARAFATE) 100 mg/mL suspension Take 10 mLs (1 g total) by mouth as needed. 414 mL 6    TRADJENTA 5 mg Tab tablet TAKE ONE TABLET BY MOUTH DAILY 30 tablet 5     No current facility-administered medications on file prior to visit.

## 2025-07-21 NOTE — TELEPHONE ENCOUNTER
[facility]/Population Health Chart Review & Patient Outreach Details For Medication Adherence Project    Reason for Outreach Encounter: 3rd Party payor non-compliance report ([employer], [employer], [employer], etc)  2.  Patient Outreach Method: Reviewed patient chart   3.   Medication in question:    Hyperlipidemia Medications              atorvastatin (LIPITOR) 40 MG tablet Take 40 mg by mouth once daily.                  atorvastatin  last filled  7/8 for 30 day supply      4.  Reviewed and or Updates Made To: Patient Chart  5. Outreach Outcomes and/or actions taken: Patient filled medication and is on track to be adherent  Additional Notes:

## 2025-07-22 ENCOUNTER — LAB VISIT (OUTPATIENT)
Dept: LAB | Facility: HOSPITAL | Age: 60
End: 2025-07-22
Payer: COMMERCIAL

## 2025-07-22 ENCOUNTER — OFFICE VISIT (OUTPATIENT)
Facility: CLINIC | Age: 60
End: 2025-07-22
Payer: COMMERCIAL

## 2025-07-22 VITALS
HEART RATE: 77 BPM | BODY MASS INDEX: 25.04 KG/M2 | DIASTOLIC BLOOD PRESSURE: 68 MMHG | RESPIRATION RATE: 16 BRPM | OXYGEN SATURATION: 98 % | TEMPERATURE: 98 F | HEIGHT: 64 IN | WEIGHT: 146.69 LBS | SYSTOLIC BLOOD PRESSURE: 127 MMHG

## 2025-07-22 DIAGNOSIS — C34.91 SQUAMOUS CELL LUNG CANCER, RIGHT: ICD-10-CM

## 2025-07-22 DIAGNOSIS — C7A.1 LARGE CELL NEUROENDOCRINE CARCINOMA: ICD-10-CM

## 2025-07-22 DIAGNOSIS — C34.91 SQUAMOUS CELL CARCINOMA OF RIGHT LUNG: ICD-10-CM

## 2025-07-22 DIAGNOSIS — C34.90 MALIGNANT NEOPLASM OF UNSPECIFIED PART OF UNSPECIFIED BRONCHUS OR LUNG: ICD-10-CM

## 2025-07-22 DIAGNOSIS — C7A.1 LARGE CELL NEUROENDOCRINE CARCINOMA OF RIGHT MAIN BRONCHUS: ICD-10-CM

## 2025-07-22 DIAGNOSIS — C7A.1 LARGE CELL NEUROENDOCRINE CARCINOMA OF RIGHT MAIN BRONCHUS: Primary | ICD-10-CM

## 2025-07-22 DIAGNOSIS — M62.838 MUSCLE SPASMS OF NECK: ICD-10-CM

## 2025-07-22 LAB
ALBUMIN SERPL-MCNC: 3 G/DL (ref 3.5–5)
ALBUMIN/GLOB SERPL: 1.2 RATIO (ref 1.1–2)
ALP SERPL-CCNC: 115 UNIT/L (ref 40–150)
ALT SERPL-CCNC: 8 UNIT/L (ref 0–55)
ANION GAP SERPL CALC-SCNC: 6 MEQ/L
AST SERPL-CCNC: 9 UNIT/L (ref 11–45)
BASOPHILS # BLD AUTO: 0.04 X10(3)/MCL
BASOPHILS NFR BLD AUTO: 0.6 %
BILIRUB SERPL-MCNC: 0.2 MG/DL
BUN SERPL-MCNC: 12 MG/DL (ref 9.8–20.1)
CALCIUM SERPL-MCNC: 8.1 MG/DL (ref 8.4–10.2)
CEA SERPL-MCNC: 2.51 NG/ML (ref 0–3)
CHLORIDE SERPL-SCNC: 110 MMOL/L (ref 98–107)
CO2 SERPL-SCNC: 24 MMOL/L (ref 22–29)
CREAT SERPL-MCNC: 0.8 MG/DL (ref 0.55–1.02)
CREAT/UREA NIT SERPL: 15
EOSINOPHIL # BLD AUTO: 0.02 X10(3)/MCL (ref 0–0.9)
EOSINOPHIL NFR BLD AUTO: 0.3 %
ERYTHROCYTE [DISTWIDTH] IN BLOOD BY AUTOMATED COUNT: 15.4 % (ref 11.5–17)
GFR SERPLBLD CREATININE-BSD FMLA CKD-EPI: >60 ML/MIN/1.73/M2
GLOBULIN SER-MCNC: 2.6 GM/DL (ref 2.4–3.5)
GLUCOSE SERPL-MCNC: 359 MG/DL (ref 74–100)
HCT VFR BLD AUTO: 29.8 % (ref 37–47)
HGB BLD-MCNC: 9.5 G/DL (ref 12–16)
IMM GRANULOCYTES # BLD AUTO: 0.1 X10(3)/MCL (ref 0–0.04)
IMM GRANULOCYTES NFR BLD AUTO: 1.5 %
LYMPHOCYTES # BLD AUTO: 1.92 X10(3)/MCL (ref 0.6–4.6)
LYMPHOCYTES NFR BLD AUTO: 28.4 %
MAGNESIUM SERPL-MCNC: 2 MG/DL (ref 1.6–2.6)
MCH RBC QN AUTO: 28.9 PG (ref 27–31)
MCHC RBC AUTO-ENTMCNC: 31.9 G/DL (ref 33–36)
MCV RBC AUTO: 90.6 FL (ref 80–94)
MONOCYTES # BLD AUTO: 1.01 X10(3)/MCL (ref 0.1–1.3)
MONOCYTES NFR BLD AUTO: 14.9 %
NEUTROPHILS # BLD AUTO: 3.67 X10(3)/MCL (ref 2.1–9.2)
NEUTROPHILS NFR BLD AUTO: 54.3 %
NRBC BLD AUTO-RTO: 0 %
PLATELET # BLD AUTO: 351 X10(3)/MCL (ref 130–400)
PMV BLD AUTO: 9.8 FL (ref 7.4–10.4)
POTASSIUM SERPL-SCNC: 4.6 MMOL/L (ref 3.5–5.1)
PROT SERPL-MCNC: 5.6 GM/DL (ref 6.4–8.3)
RBC # BLD AUTO: 3.29 X10(6)/MCL (ref 4.2–5.4)
SODIUM SERPL-SCNC: 140 MMOL/L (ref 136–145)
WBC # BLD AUTO: 6.76 X10(3)/MCL (ref 4.5–11.5)

## 2025-07-22 PROCEDURE — 1159F MED LIST DOCD IN RCRD: CPT | Mod: CPTII,S$GLB,,

## 2025-07-22 PROCEDURE — 82378 CARCINOEMBRYONIC ANTIGEN: CPT

## 2025-07-22 PROCEDURE — 3078F DIAST BP <80 MM HG: CPT | Mod: CPTII,S$GLB,,

## 2025-07-22 PROCEDURE — 4010F ACE/ARB THERAPY RXD/TAKEN: CPT | Mod: CPTII,S$GLB,,

## 2025-07-22 PROCEDURE — 99215 OFFICE O/P EST HI 40 MIN: CPT | Mod: S$GLB,,,

## 2025-07-22 PROCEDURE — 3052F HG A1C>EQUAL 8.0%<EQUAL 9.0%: CPT | Mod: CPTII,S$GLB,,

## 2025-07-22 PROCEDURE — 3008F BODY MASS INDEX DOCD: CPT | Mod: CPTII,S$GLB,,

## 2025-07-22 PROCEDURE — 85025 COMPLETE CBC W/AUTO DIFF WBC: CPT

## 2025-07-22 PROCEDURE — 36415 COLL VENOUS BLD VENIPUNCTURE: CPT

## 2025-07-22 PROCEDURE — 3074F SYST BP LT 130 MM HG: CPT | Mod: CPTII,S$GLB,,

## 2025-07-22 PROCEDURE — 99999 PR PBB SHADOW E&M-EST. PATIENT-LVL V: CPT | Mod: PBBFAC,,,

## 2025-07-22 PROCEDURE — G2211 COMPLEX E/M VISIT ADD ON: HCPCS | Mod: S$GLB,,,

## 2025-07-22 PROCEDURE — 83735 ASSAY OF MAGNESIUM: CPT

## 2025-07-22 PROCEDURE — 80053 COMPREHEN METABOLIC PANEL: CPT

## 2025-07-22 PROCEDURE — 1160F RVW MEDS BY RX/DR IN RCRD: CPT | Mod: CPTII,S$GLB,,

## 2025-07-31 NOTE — PROGRESS NOTES
Audio Only Telehealth Visit     The patient location is: [place]   The chief complaint leading to consultation is: audiology review/treatment planning  Visit type: Virtual visit with audio only (telephone)  Total time spent in medical discussion with patient: 11 minutes  Total time spent on date of the encounter:35 minutes       The reason for the audio only service rather than synchronous audio and video virtual visit was related to technical difficulties or patient preference/necessity.       Each patient to whom I provide medical services by telemedicine is:  (1) informed of the relationship between the physician and patient and the respective role of any other health care provider with respect to management of the patient; and (2) notified that they may decline to receive medical services by telemedicine and may withdraw from such care at any time. Patient verbally consented to receive this service via voice-only telephone call.       HEMATOLOGY/ONCOLOGY OFFICE CLINIC VISIT    Visit Information: follow up Neuroendocrine (F/u with audiogram results- Pt reports no new concerns )       Initial Evaluation: 6/3/25  Referring Provider: Dr. Lewis Lees- cardiothoracic   Other providers: Dr. 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 Lewis Lees    Plan of care:     Imaging:  CT lung screening 06/28/2023 at [facility]:  there is an 8.2 mm anterior pleural-based nodule in the right upper lobe.  5.7 mm semi-solid nodule in the posterior right upper lobe with surrounding interstitial prominence.  Focal scarring seen in the superior segment of the right lower lobe.  3.8 mm pleural-based nodule in the lateral aspect of the lingula.   Lung rads category 4A-suspicious abnormality.  Three-month follow-up low-dose CT recommended.  Alternatively PET-CT.  7/13/23 PET/CT @ [facility]: No PET evidence of metabolically active disease. Tiny left lower lobe nodule without abnormal uptake, however, it is too small to characterize by PET and should be monitor.  1/11/2024 CT Low Dose Lung Screening @ [facility]:Previously noted anterior right upper lobe spiculated nodule is again appreciated and is unchanged in size and appearance.  Previously seen groundglass nodule posterior right upper lobe is no longer identified.  Pleural-based nodule noted in the lingula is unchanged in size and appearance.  Lung rads category 2-benign appearance or behavior  1/16/25 CT Low Dose Lung Screening @ [facility]:  Significant changes in the appearance of the spiculated nodule in the anterior right upper lobe when compared with the 2 previous examinations.  No measures 10.3 than 19.3 mm with spiculation extending to the pleural surface anteriorly and medially.  Associated atelectasis in the anteromedial right upper lobe.  Previously seen juxtapleural nodule in the lingula is unchanged in size and appearance. Lung rads category 4A-suspicious abnormality.  PET-CT and/or biopsy considered  3/3/25 PET/CT [facility]: Mild uptake has developed in the right pulmonary hilum with SUV at 3.9, which compared to the uptake in the left pulmonary hilum with peak SUV of 2.6.  Uptake measured 1.3 cm.  Hypermetabolic spiculated nodule developing the anterior medially right upper lobe measuring 1.7 cm with SUV of 8.7, highly concerning for primary lung cancer.  Impression new hypermetabolic spiculated nodule in the anteromedial right upper lobe, concerning of malignancy.  New mild uptake in the right pulmonary hilum which is somewhat concerning and should be closely monitored.  No other suspicious uptake  6/24/25 PET/CT [facility]:  Interval postsurgical changes of  right upper lobectomy without abnormal uptake to suggest metabolically-active residual/recurrent or metastatic disease  Interval resolution of mild uptake in the right pulmonary hilum  No new suspicious uptake    Pathology:  3/21/25:  10R:  Negative for malignancy.  Cellular smear showing benign bronchial epithelial cells and lymphoid tissue admixed with hemosiderin laden macrophage.    4/23/25 RUL Lobectomy:  1. LYMPH NODES, 10R, EXCISION:    - THREE LYMPH NODES, NEGATIVE FOR METASTATIC CARCINOMA.   2. LUNG, RIGHT UPPER LOBE, LOBECTOMY:    - COMBINED LARGE CELL NEUROENDOCRINE AND SQUAMOUS CELL CARCINOMA,   - ONE HILAR LYMPH NODE, NEGATIVE FOR METASTATIC CARCINOMA.   3. LYMPH NODE 11R, EXCISION:    - ONE LYMPH NODE, NEGATIVE FOR METASTATIC CARCINOMA   G3, pT2a pN0  PDL-1: 1%      [facility] 5/9/2025:  CDKN2A ( Pathogenic ) p.R99P - c.296G>C Missense variant - LOFVAF: 17.1%   FGFR1 ( Pathogenic ) FGFR1 Copy number gain   PTEN ( Pathogenic ) p.E307* - c.919G>T Stop gain - LOFVAF: 25.8%   TP53 ( Pathogenic ) p.R249M - c.746G>T Missense variant - LOF    HER2 Result NEGATIVE   Score 0         CLINICAL HISTORY:       Patient: Jocelyne Dickinson is a 60 y.o. female with history of diabetes, COPD, hypertension, hyperlipidemia, smoker (now vaping) kindly referred for newly diagnosed non-small-cell lung carcinoma.    Patient was undergoing low-dose CT scan lung cancer screening and most recent showed significant changes in a spiculated nodule in the right upper lobe.  PET-CT was performed that showed activity in that area but no distant metastases.    Patient underwent VATS right thoracotomy on 4/23/2025 by Dr. Lewis Lees.  Pathology revealed combined large cell neuroendocrine and squamous cell carcinoma.  All lymph nodes were negative for metastatic disease with a pathologic staging of 1B (T2aN0). Patient was presented at the multidisciplinary tumor board on 05/14/2025 and recommendations to repeat PET-CT or MRI and for adjuvant  chemotherapy.  No role for radiation therapy.    Patient is here today and has been healing well from the surgery.  She does report some wheezing for about a with secondary to allergies.  No coughing.  No shortness of breath.  No chest pain.  No fever, chills, sweats.  She is here with her sister that has history of lung cancer.  Father had history of throat cancer.  They will also smoker.  Brother with renal cell carcinoma (no smoker).    Chief Complaint: Neuroendocrine (F/u with audiogram results- Pt reports no new concerns )      Interval History:  She return to the clinic today for a follow-up and audiology appt review and to discuss treatment planning.   She feels well today.  ENT note reviewed in detail. Tinnitus stable. She reports that Dr. Gardiner states her tinnitus was not associated with chemotherapy. I did again discuss the potential risk of ototoxicity if cisplatin was continued and discussed switching to carboplatin. She was agreeable to switch to carboplatin at this time.   Muscle spasms to neck controlled with flexeril as needed.   She will follow-up with ENT as needed.   She denies any SOB, CP, headache, rash, neuropathy, fever, or chills.     Past Medical History:   Diagnosis Date    Abdominal pain     resolved    Anxiety disorder, unspecified     Carcinoma     Carotid artery stenosis     Cellulitis of scalp     resolved    Constipation     COPD (chronic obstructive pulmonary disease)     Diabetes mellitus     Digestive disorder     acid reflux    Disorder of pancreas     Disorder of pancreatic duct     Emphysema, unspecified     Family history of adverse effect to anesthesia     Fatigue     Gastrointestinal tract imaging abnormality     GERD (gastroesophageal reflux disease)     HTN (hypertension)     Insomnia     Mixed hyperlipidemia     Neuropathy     Nodule of apex of right lung     Osteoarthritis     Pancreatitis     Personal history of nicotine dependence     Pneumonia, unspecified organism      Shortness of breath     SOB (shortness of breath) on exertion     Syncope     Weight loss       Past Surgical History:   Procedure Laterality Date    BRONCHOSCOPY  03/19/2025    [facility]-    CATARACT EXTRACTION      COLONOSCOPY  07/02/2021    Dr. Nila Sharma    EGD, WITH CLOSED BIOPSY  03/12/2024    Procedure: EGD, WITH CLOSED BIOPSY;  Surgeon: Diego Atkinson MD;  Location: [facility] OR;  Service: Gastroenterology;;  CBD 5 mm, HOP Cyst 4.5mm x 5 mm, Chronic Pancreatitis, PD Head 2.2 mm, PD Body 4.2 mm,    EYE SURGERY  2022    Cataract surgery on both eyes    INSERTION OF TUNNELED CENTRAL VENOUS CATHETER (CVC) WITH SUBCUTANEOUS PORT N/A 6/18/2025    Procedure: [id number]-[id number];  Surgeon: Lewis Lees MD;  Location: [facility] OR;  Service: Cardiovascular;  Laterality: N/A;  requests 700    REPAIR OF CAROTID ARTERY      THORACOTOMY Right 4/23/2025    Procedure: THORACOTOMY;  Surgeon: Lewis Lees MD;  Location: [facility] OR;  Service: Cardiovascular;  Laterality: Right;    TONSILLECTOMY  1969    I was 4 years old when tonsils were removed    ULTRASOUND, ENDOSCOPIC, WITH FINE NEEDLE ASPIRATION N/A 03/12/2024    Procedure: UPPER EUS W/ POSS FNA;  Surgeon: Diego Atkinson MD;  Location: [facility] OR;  Service: Gastroenterology;  Laterality: N/A;  MRI abd- Limited exam due to image quality. Overall pancreatic atrophy. MAin PD dilation to 9mm in the tail with dialtied side branches. Rerlative abrupt caliber change of main PD at body and tail junction.    VIDEO-ASSISTED THORACOSCOPIC SURGERY (VATS) Right 4/23/2025    Procedure: VATS (VIDEO-ASSISTED THORACOSCOPIC SURGERY);  Surgeon: Lewis Lees MD;  Location: [facility] OR;  Service: Cardiovascular;  Laterality: Right;  RIGHT VATS, RIGHT UPPER LOBECTOMY    WRIST SURGERY Bilateral      Family History   Problem Relation Name Age of Onset    Hypertension Mother      Hypertension Father Yordy Dartez     Heart disease Father Yordy Dartez     Cancer Father Yordy  Teena     Stroke Father Yordy Teena     Cancer Maternal Grandmother      Vision loss Maternal Grandmother      Cancer Sister Maribel Dickinson Arcement 50 - 59    Cancer Brother Moustapha Dickinson 60 - 69          Review of patient's allergies indicates:   Allergen Reactions    Aller ext-american cockroach Itching and Other (See Comments)    Allerg ext-tree poll-red maple Itching and Other (See Comments)    Cat hair standardized allergenic extract Itching and Other (See Comments)    Dog hair standardized allergenic extract Itching and Other (See Comments)    Grass pollen-ruslan, standard Itching and Other (See Comments)    Horse dander Itching and Other (See Comments)    Tramadol Itching    Tree pollen-box elder Itching and Other (See Comments)    Tree pollen-pecan Itching and Other (See Comments)    Trulicity [dulaglutide] Other (See Comments)     Patient had pancreatitis        Medications Ordered Prior to Encounter[1]   Review of Systems   Constitutional:  Negative for activity change, appetite change, chills, fatigue, fever, night sweats and unexpected weight change.   HENT:  Positive for hearing loss and tinnitus. Negative for mouth dryness, mouth sores, nosebleeds, sore throat and trouble swallowing.    Eyes: Negative.  Negative for visual disturbance.   Respiratory:  Negative for cough and shortness of breath.    Cardiovascular:  Negative for chest pain, palpitations and leg swelling.   Gastrointestinal:  Negative for abdominal distention, abdominal pain, blood in stool, change in bowel habit, constipation, diarrhea, nausea and vomiting.   Endocrine: Negative.    Genitourinary:  Negative for dysuria, frequency, hematuria and urgency.   Musculoskeletal:  Negative for arthralgias, back pain, myalgias and neck pain.   Integumentary:  Negative for rash.   Neurological:  Negative for dizziness, tremors, syncope, speech difficulty, weakness, light-headedness, numbness, headaches and memory loss.   Hematological:   Negative for adenopathy. Does not bruise/bleed easily.   Psychiatric/Behavioral:  Negative for confusion and suicidal ideas. The patient is not nervous/anxious.               There were no vitals filed for this visit.         Wt Readings from Last 6 Encounters:   07/22/25 66.5 kg (146 lb 11.2 oz)   07/07/25 65.6 kg (144 lb 9.6 oz)   07/03/25 65 kg (143 lb 4.8 oz)   07/02/25 65 kg (143 lb 4.8 oz)   07/01/25 65.6 kg (144 lb 9.6 oz)   07/01/25 65.6 kg (144 lb 9.6 oz)     There is no height or weight on file to calculate BMI.  There is no height or weight on file to calculate BSA.  Physical Exam  Vitals and nursing note reviewed. Exam conducted with a chaperone present.   Constitutional:       General: She is not in acute distress.     Appearance: Normal appearance. She is well-developed.   HENT:      Head: Normocephalic and atraumatic.      Mouth/Throat:      Mouth: Mucous membranes are moist.   Eyes:      General: No scleral icterus.     Extraocular Movements: Extraocular movements intact.      Conjunctiva/sclera: Conjunctivae normal.      Pupils: Pupils are equal, round, and reactive to light.   Neck:      Vascular: No JVD.   Cardiovascular:      Rate and Rhythm: Normal rate and regular rhythm.      Heart sounds: No murmur heard.  Pulmonary:      Effort: Pulmonary effort is normal.      Breath sounds: No wheezing or rhonchi.   Abdominal:      General: Bowel sounds are normal. There is no distension.      Palpations: Abdomen is soft. There is no mass.      Tenderness: There is no abdominal tenderness.   Musculoskeletal:         General: No swelling or deformity.      Cervical back: Neck supple.   Lymphadenopathy:      Head:      Right side of head: No submental or submandibular adenopathy.      Left side of head: No submental or submandibular adenopathy.      Cervical: No cervical adenopathy.      Lower Body: No right inguinal adenopathy. No left inguinal adenopathy.   Skin:     General: Skin is warm.      Coloration:  Skin is not jaundiced.      Findings: No lesion or rash.      Nails: There is no clubbing.   Neurological:      General: No focal deficit present.      Mental Status: She is alert and oriented to person, place, and time.      Cranial Nerves: Cranial nerves 2-12 are intact.   Psychiatric:         Attention and Perception: Attention normal.         Behavior: Behavior is cooperative.         Judgment: Judgment normal.       ECOG SCORE    0 - Fully active-able to carry on all pre-disease performance without restriction         Laboratory:  CBC with Differential:  Lab Results   Component Value Date    WBC 6.76 07/22/2025    RBC 3.29 (L) 07/22/2025    HGB 9.5 (L) 07/22/2025    HCT 29.8 (L) 07/22/2025    MCV 90.6 07/22/2025    MCH 28.9 07/22/2025    MCHC 31.9 (L) 07/22/2025    RDW 15.4 07/22/2025     07/22/2025    MPV 9.8 07/22/2025        CMP:  Sodium   Date Value Ref Range Status   07/22/2025 140 136 - 145 mmol/L Final   04/21/2021 143 136 - 145 mmol/L Final     Potassium   Date Value Ref Range Status   07/22/2025 4.6 3.5 - 5.1 mmol/L Final   04/21/2021 4.5 3.5 - 5.1 mmol/L Final     Chloride   Date Value Ref Range Status   07/22/2025 110 (H) 98 - 107 mmol/L Final   04/21/2021 106 100 - 109 mmol/L Final     CO2   Date Value Ref Range Status   07/22/2025 24 22 - 29 mmol/L Final     Carbon Dioxide   Date Value Ref Range Status   04/21/2021 31 22 - 33 mmol/L Final     Glucose   Date Value Ref Range Status   07/22/2025 359 (H) 74 - 100 mg/dL Final     Blood Urea Nitrogen   Date Value Ref Range Status   07/22/2025 12.0 9.8 - 20.1 mg/dL Final   04/21/2021 10 7 - 18 mg/dL Final     Creatinine   Date Value Ref Range Status   07/22/2025 0.80 0.55 - 1.02 mg/dL Final   04/21/2021 0.90 0.57 - 1.25 mg/dL Final     Calcium   Date Value Ref Range Status   07/22/2025 8.1 (L) 8.4 - 10.2 mg/dL Final   04/21/2021 8.8 8.8 - 10.6 mg/dL Final     Protein Total   Date Value Ref Range Status   07/22/2025 5.6 (L) 6.4 - 8.3 gm/dL Final      Albumin   Date Value Ref Range Status   07/22/2025 3.0 (L) 3.5 - 5.0 g/dL Final     Bilirubin Total   Date Value Ref Range Status   07/22/2025 0.2 <=1.5 mg/dL Final     ALP   Date Value Ref Range Status   07/22/2025 115 40 - 150 unit/L Final     AST   Date Value Ref Range Status   07/22/2025 9 (L) 11 - 45 unit/L Final     ALT   Date Value Ref Range Status   07/22/2025 8 0 - 55 unit/L Final     Anion Gap   Date Value Ref Range Status   04/21/2021 6 (L) 8 - 16 mmol/L Final     eGFR    Date Value Ref Range Status   04/21/2021 79 >=60 mL/min/1.73mSq Final     Estimated GFR-Non    Date Value Ref Range Status   04/30/2022 58               Assessment:       1. 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 Squamous cell lung cancer, right    3. Large cell neuroendocrine carcinoma    4. 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 Lewis Lees  --NCCN guidelines for stage IB, margins neg (R0): Observe vs adjuvant systemic chemotherapy  I explained to the patient that she has a combined carcinoma.  Pathology came back as: Large cell neuroendocrine carcinoma and squamous cell carcinoma and both are subtypes of non-small cell lung cancer (NSCLC), but they differ significantly in epidemiology, biology, and therapeutic approach.   Large cell neuroendocrine carcinoma is rare but highly aggressive, accounting for approximately 3% of lung cancers.  Squamous cell carcinoma is more common, representing 20-30% of NSCLC cases.   Comprehensive molecular testing for actionable  mutations (e.g., EGFR, ALK, ROS1, BRAF) and PD-L1 expression is recommended in all advanced NSCLC, This was done and pending.   For large cell neuroendocrine carcinoma, treatment is stage-dependent. In early-stage (I-III) disease, surgical resection is preferred for  operable patients. However, due to the high risk of recurrence, adjuvant platinum-etoposide chemotherapy is recommended even for stage I disease.  She was started on cisplatin/etoposide. Discussed with Dr. Powers. Due to ototoxicity , will hold any further treatment and proceed with carboplatin with AUC of 6 following audiogram for baseline.         Plan:   Discussed regimen in detail.   Cisplatin switched to carboplatin with AUC 6.  Continued potential ototoxicity toxicity reviewed in detail. She verbalized understanding and is agreeable with plan.  Will plan for OV with chemo ed and signing of consents on 8/4/2025 and treat following.   Continue to follow ENT as needed, Dr. Gardiner  Continue with Dr. Lees.  Repeat PET following C2  RTC 8/4/2025 with NP for FU/lab/treat       LEANN Quevedo-C  Oncology/Hematology   [facility] personally reviewed all pertinent imaging, lab results, explained to the patient the pertinent information in detail including radiographic imaging, abnormal lab results. All questions were answered thoroughly. Total time spent on this visit was >35 minutes.     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              [1]   Current Outpatient Medications on File Prior to Visit   Medication Sig Dispense Refill    acetaminophen (TYLENOL) 500 MG tablet Take 1,000 mg by mouth nightly as needed for Pain.      albuterol (PROVENTIL/VENTOLIN HFA) 90 mcg/actuation inhaler inhale TWO puffs EVERY 4 TO 6 HOURS AS NEEDED FOR wheezing 6.7 g 6    ALPRAZolam (XANAX) 0.5 MG tablet Take 1 tablet (0.5 mg total) by mouth 2 (two) times daily. 60 tablet 5     atorvastatin (LIPITOR) 40 MG tablet Take 40 mg by mouth once daily.      azelastine (ASTELIN) 137 mcg (0.1 %) nasal spray 1 spray by Nasal route daily as needed for Rhinitis.      b complex vitamins tablet Take 1 tablet by mouth once daily.      blood-glucose meter,continuous (DEXCOM G7 ) Misc 1 each by Misc.(Non-Drug; Combo Route) route continuous. 1 each 0    blood-glucose sensor (DEXCOM G7 SENSOR) Kristen 1 each by Misc.(Non-Drug; Combo Route) route every 10 days. 5 each 11    budesonide-formoterol 160-4.5 mcg (SYMBICORT) 160-4.5 mcg/actuation HFAA INHALE TWO PUFFS TWICE A DAY 10.2 g 6    citalopram (CELEXA) 10 MG tablet TAKE ONE TABLET BY MOUTH ONCE DAILY WITH 20 MG 30 tablet 5    citalopram (CELEXA) 20 MG tablet Take 1 tablet (20 mg total) by mouth once daily. 30 tablet 5    clopidogreL (PLAVIX) 75 mg tablet TAKE ONE TABLET BY MOUTH DAILY 30 tablet 4    dexAMETHasone (DECADRON) 4 MG Tab Take 2 tablets (8 mg total) by mouth once daily. on days 2-4 of each chemotherapy cycle. 6 tablet 5    docusate sodium (COLACE) 100 MG capsule Take 1 capsule (100 mg total) by mouth 2 (two) times daily. 60 capsule 5    esomeprazole (NEXIUM) 40 MG capsule TAKE ONE CAPSULE BY MOUTH BEFORE breakfast 30 capsule 9    gabapentin (NEURONTIN) 300 MG capsule TAKE ONE CAPSULE BY MOUTH EVERY EVENING 30 capsule 11    glimepiride (AMARYL) 2 MG tablet TAKE ONE TABLET BY MOUTH TWICE DAILY FOR DIABETES 180 tablet 5    hydrocortisone 2.5 % cream SMARTSIG:sparingly Topical 3 Times Daily      hydrOXYzine pamoate (VISTARIL) 25 MG Cap TAKE ONE CAPSULE BY MOUTH DAILY 30 capsule 5    ibuprofen (ADVIL,MOTRIN) 800 MG tablet Take 800 mg by mouth daily as needed for Pain.      insulin aspart U-100 (NOVOLOG FLEXPEN U-100 INSULIN) 100 unit/mL (3 mL) InPn pen Inject 6 Units into the skin 3 (three) times daily with meals. 5.4 mL 11    insulin glargine U-100, Lantus, (LANTUS SOLOSTAR U-100 INSULIN) 100 unit/mL (3 mL) InPn pen Inject 30 Units into the  "skin once daily.      linaCLOtide (LINZESS) 145 mcg Cap capsule Take 145 mcg by mouth before breakfast.      losartan (COZAAR) 100 MG tablet TAKE 1 TABLET BY MOUTH DAILY FOR BLOOD PRESSURE 30 tablet 5    mirtazapine (REMERON) 15 MG tablet TAKE 1/2 TABLET BY MOUTH DAILY EVERY EVENING 15 tablet 11    NIFEdipine (PROCARDIA-XL) 60 MG (OSM) 24 hr tablet TAKE ONE TABLET BY MOUTH DAILY FOR BLOOD PRESSURE 30 tablet 5    OLANZapine (ZYPREXA) 5 MG tablet Take 1 tablet (5 mg total) by mouth every evening. on days 1-4 of each chemotherapy cycle. 4 tablet 5    pen needle, diabetic 31 gauge x 3/16" Ndle 1 Pen by Misc.(Non-Drug; Combo Route) route Daily. 100 each 1    pioglitazone (ACTOS) 30 MG tablet TAKE ONE TABLET BY MOUTH DAILY 30 tablet 2    prochlorperazine (COMPAZINE) 5 MG tablet Take 2 tablets (10 mg total) by mouth every 6 (six) hours as needed for Nausea. 20 tablet 5    SPIRIVA WITH HANDIHALER 18 mcg inhalation capsule Inhale 1 capsule into the lungs Daily.      sucralfate (CARAFATE) 100 mg/mL suspension Take 10 mLs (1 g total) by mouth as needed. 414 mL 6    TRADJENTA 5 mg Tab tablet TAKE ONE TABLET BY MOUTH DAILY 30 tablet 5     No current facility-administered medications on file prior to visit.     "

## 2025-08-01 ENCOUNTER — OFFICE VISIT (OUTPATIENT)
Facility: CLINIC | Age: 60
End: 2025-08-01
Payer: COMMERCIAL

## 2025-08-01 DIAGNOSIS — C34.91 SQUAMOUS CELL LUNG CANCER, RIGHT: ICD-10-CM

## 2025-08-01 DIAGNOSIS — C7A.1 LARGE CELL NEUROENDOCRINE CARCINOMA OF RIGHT MAIN BRONCHUS: Primary | ICD-10-CM

## 2025-08-01 DIAGNOSIS — M62.838 MUSCLE SPASMS OF NECK: ICD-10-CM

## 2025-08-01 DIAGNOSIS — C7A.1 LARGE CELL NEUROENDOCRINE CARCINOMA: ICD-10-CM

## 2025-08-04 ENCOUNTER — LAB VISIT (OUTPATIENT)
Dept: LAB | Facility: HOSPITAL | Age: 60
End: 2025-08-04
Payer: COMMERCIAL

## 2025-08-04 ENCOUNTER — OFFICE VISIT (OUTPATIENT)
Facility: CLINIC | Age: 60
End: 2025-08-04
Payer: COMMERCIAL

## 2025-08-04 ENCOUNTER — INFUSION (OUTPATIENT)
Facility: HOSPITAL | Age: 60
End: 2025-08-04
Attending: FAMILY MEDICINE
Payer: COMMERCIAL

## 2025-08-04 VITALS
HEIGHT: 64 IN | RESPIRATION RATE: 20 BRPM | OXYGEN SATURATION: 98 % | DIASTOLIC BLOOD PRESSURE: 71 MMHG | BODY MASS INDEX: 24.82 KG/M2 | WEIGHT: 145.38 LBS | HEART RATE: 61 BPM | TEMPERATURE: 98 F | SYSTOLIC BLOOD PRESSURE: 130 MMHG

## 2025-08-04 VITALS
DIASTOLIC BLOOD PRESSURE: 71 MMHG | OXYGEN SATURATION: 98 % | SYSTOLIC BLOOD PRESSURE: 130 MMHG | WEIGHT: 143.31 LBS | TEMPERATURE: 98 F | RESPIRATION RATE: 20 BRPM | HEART RATE: 61 BPM | BODY MASS INDEX: 24.46 KG/M2 | HEIGHT: 64 IN

## 2025-08-04 DIAGNOSIS — C7A.1 LARGE CELL NEUROENDOCRINE CARCINOMA OF RIGHT MAIN BRONCHUS: ICD-10-CM

## 2025-08-04 DIAGNOSIS — C7A.1 LARGE CELL NEUROENDOCRINE CARCINOMA OF RIGHT MAIN BRONCHUS: Primary | ICD-10-CM

## 2025-08-04 DIAGNOSIS — C7A.1 LARGE CELL NEUROENDOCRINE CARCINOMA: Primary | ICD-10-CM

## 2025-08-04 DIAGNOSIS — C34.90 MALIGNANT NEOPLASM OF UNSPECIFIED PART OF UNSPECIFIED BRONCHUS OR LUNG: ICD-10-CM

## 2025-08-04 DIAGNOSIS — C34.91 SQUAMOUS CELL LUNG CANCER, RIGHT: ICD-10-CM

## 2025-08-04 DIAGNOSIS — M62.838 MUSCLE SPASMS OF NECK: ICD-10-CM

## 2025-08-04 DIAGNOSIS — C34.91 SQUAMOUS CELL CARCINOMA OF RIGHT LUNG: ICD-10-CM

## 2025-08-04 LAB
ALBUMIN SERPL-MCNC: 3.3 G/DL (ref 3.5–5)
ALBUMIN/GLOB SERPL: 1.2 RATIO (ref 1.1–2)
ALP SERPL-CCNC: 105 UNIT/L (ref 40–150)
ALT SERPL-CCNC: 8 UNIT/L (ref 0–55)
ANION GAP SERPL CALC-SCNC: 6 MEQ/L
AST SERPL-CCNC: 13 UNIT/L (ref 11–45)
BASOPHILS # BLD AUTO: 0.05 X10(3)/MCL
BASOPHILS NFR BLD AUTO: 1.3 %
BILIRUB SERPL-MCNC: 0.3 MG/DL
BUN SERPL-MCNC: 16 MG/DL (ref 9.8–20.1)
CALCIUM SERPL-MCNC: 8.6 MG/DL (ref 8.4–10.2)
CEA SERPL-MCNC: 2.44 NG/ML (ref 0–3)
CHLORIDE SERPL-SCNC: 112 MMOL/L (ref 98–107)
CO2 SERPL-SCNC: 24 MMOL/L (ref 22–29)
CREAT SERPL-MCNC: 0.75 MG/DL (ref 0.55–1.02)
CREAT/UREA NIT SERPL: 21
EOSINOPHIL # BLD AUTO: 0.07 X10(3)/MCL (ref 0–0.9)
EOSINOPHIL NFR BLD AUTO: 1.8 %
ERYTHROCYTE [DISTWIDTH] IN BLOOD BY AUTOMATED COUNT: 16 % (ref 11.5–17)
GFR SERPLBLD CREATININE-BSD FMLA CKD-EPI: >60 ML/MIN/1.73/M2
GLOBULIN SER-MCNC: 2.7 GM/DL (ref 2.4–3.5)
GLUCOSE SERPL-MCNC: 191 MG/DL (ref 74–100)
HCT VFR BLD AUTO: 34.2 % (ref 37–47)
HGB BLD-MCNC: 10.6 G/DL (ref 12–16)
IMM GRANULOCYTES # BLD AUTO: 0.01 X10(3)/MCL (ref 0–0.04)
IMM GRANULOCYTES NFR BLD AUTO: 0.3 %
LYMPHOCYTES # BLD AUTO: 1.26 X10(3)/MCL (ref 0.6–4.6)
LYMPHOCYTES NFR BLD AUTO: 32.4 %
MAGNESIUM SERPL-MCNC: 2.1 MG/DL (ref 1.6–2.6)
MCH RBC QN AUTO: 29 PG (ref 27–31)
MCHC RBC AUTO-ENTMCNC: 31 G/DL (ref 33–36)
MCV RBC AUTO: 93.4 FL (ref 80–94)
MONOCYTES # BLD AUTO: 0.49 X10(3)/MCL (ref 0.1–1.3)
MONOCYTES NFR BLD AUTO: 12.6 %
NEUTROPHILS # BLD AUTO: 2.01 X10(3)/MCL (ref 2.1–9.2)
NEUTROPHILS NFR BLD AUTO: 51.6 %
NRBC BLD AUTO-RTO: 0 %
PLATELET # BLD AUTO: 264 X10(3)/MCL (ref 130–400)
PMV BLD AUTO: 10.8 FL (ref 7.4–10.4)
POTASSIUM SERPL-SCNC: 4.5 MMOL/L (ref 3.5–5.1)
PROT SERPL-MCNC: 6 GM/DL (ref 6.4–8.3)
RBC # BLD AUTO: 3.66 X10(6)/MCL (ref 4.2–5.4)
SODIUM SERPL-SCNC: 142 MMOL/L (ref 136–145)
WBC # BLD AUTO: 3.89 X10(3)/MCL (ref 4.5–11.5)

## 2025-08-04 PROCEDURE — 3052F HG A1C>EQUAL 8.0%<EQUAL 9.0%: CPT | Mod: CPTII,S$GLB,,

## 2025-08-04 PROCEDURE — 83735 ASSAY OF MAGNESIUM: CPT

## 2025-08-04 PROCEDURE — 3078F DIAST BP <80 MM HG: CPT | Mod: CPTII,S$GLB,,

## 2025-08-04 PROCEDURE — 1159F MED LIST DOCD IN RCRD: CPT | Mod: CPTII,S$GLB,,

## 2025-08-04 PROCEDURE — 3008F BODY MASS INDEX DOCD: CPT | Mod: CPTII,S$GLB,,

## 2025-08-04 PROCEDURE — G2211 COMPLEX E/M VISIT ADD ON: HCPCS | Mod: S$GLB,,,

## 2025-08-04 PROCEDURE — 96375 TX/PRO/DX INJ NEW DRUG ADDON: CPT

## 2025-08-04 PROCEDURE — 96417 CHEMO IV INFUS EACH ADDL SEQ: CPT

## 2025-08-04 PROCEDURE — 99215 OFFICE O/P EST HI 40 MIN: CPT | Mod: S$GLB,,,

## 2025-08-04 PROCEDURE — 96413 CHEMO IV INFUSION 1 HR: CPT

## 2025-08-04 PROCEDURE — 3075F SYST BP GE 130 - 139MM HG: CPT | Mod: CPTII,S$GLB,,

## 2025-08-04 PROCEDURE — 36415 COLL VENOUS BLD VENIPUNCTURE: CPT

## 2025-08-04 PROCEDURE — 1160F RVW MEDS BY RX/DR IN RCRD: CPT | Mod: CPTII,S$GLB,,

## 2025-08-04 PROCEDURE — 80053 COMPREHEN METABOLIC PANEL: CPT

## 2025-08-04 PROCEDURE — A4216 STERILE WATER/SALINE, 10 ML: HCPCS | Performed by: NURSE PRACTITIONER

## 2025-08-04 PROCEDURE — 96367 TX/PROPH/DG ADDL SEQ IV INF: CPT

## 2025-08-04 PROCEDURE — 63600175 PHARM REV CODE 636 W HCPCS: Performed by: NURSE PRACTITIONER

## 2025-08-04 PROCEDURE — 82378 CARCINOEMBRYONIC ANTIGEN: CPT

## 2025-08-04 PROCEDURE — 99999 PR PBB SHADOW E&M-EST. PATIENT-LVL V: CPT | Mod: PBBFAC,,,

## 2025-08-04 PROCEDURE — 25000003 PHARM REV CODE 250

## 2025-08-04 PROCEDURE — 63600175 PHARM REV CODE 636 W HCPCS: Mod: JZ,TB

## 2025-08-04 PROCEDURE — 4010F ACE/ARB THERAPY RXD/TAKEN: CPT | Mod: CPTII,S$GLB,,

## 2025-08-04 PROCEDURE — 25000003 PHARM REV CODE 250: Performed by: NURSE PRACTITIONER

## 2025-08-04 PROCEDURE — 85025 COMPLETE CBC W/AUTO DIFF WBC: CPT

## 2025-08-04 RX ORDER — SODIUM CHLORIDE 0.9 % (FLUSH) 0.9 %
10 SYRINGE (ML) INJECTION
Status: DISCONTINUED | OUTPATIENT
Start: 2025-08-04 | End: 2025-08-04 | Stop reason: HOSPADM

## 2025-08-04 RX ORDER — HEPARIN 100 UNIT/ML
500 SYRINGE INTRAVENOUS
Status: DISCONTINUED | OUTPATIENT
Start: 2025-08-04 | End: 2025-08-04 | Stop reason: HOSPADM

## 2025-08-04 RX ADMIN — PALONOSETRON HYDROCHLORIDE 0.25 MG: 0.25 INJECTION INTRAVENOUS at 09:08

## 2025-08-04 RX ADMIN — ETOPOSIDE 174 MG: 20 INJECTION INTRAVENOUS at 10:08

## 2025-08-04 RX ADMIN — APREPITANT 130 MG: 130 INJECTION, EMULSION INTRAVENOUS at 09:08

## 2025-08-04 RX ADMIN — CARBOPLATIN 660 MG: 450 INJECTION, SOLUTION INTRAVENOUS at 10:08

## 2025-08-04 RX ADMIN — Medication 10 ML: at 11:08

## 2025-08-04 RX ADMIN — SODIUM CHLORIDE: 9 INJECTION, SOLUTION INTRAVENOUS at 09:08

## 2025-08-04 RX ADMIN — HEPARIN SODIUM (PORCINE) LOCK FLUSH IV SOLN 100 UNIT/ML 500 UNITS: 100 SOLUTION at 11:08

## 2025-08-04 NOTE — PROGRESS NOTES
HEMATOLOGY/ONCOLOGY OFFICE CLINIC VISIT    Visit Information: follow up Neuroendocine (F/u with labs-- Pt reports no new concerns )       Initial Evaluation: 6/3/25  Referring Provider: Dr. Lewis Lees- cardiothoracic   Other providers: Dr. 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 Lewis Lees    Plan of care:     Imaging:  CT lung screening 06/28/2023 at [facility]:  there is an 8.2 mm anterior pleural-based nodule in the right upper lobe.  5.7 mm semi-solid nodule in the posterior right upper lobe with surrounding interstitial prominence.  Focal scarring seen in the superior segment of the right lower lobe.  3.8 mm pleural-based nodule in the lateral aspect of the lingula.  Lung rads category 4A-suspicious abnormality.  Three-month follow-up low-dose CT recommended.  Alternatively PET-CT.  7/13/23 PET/CT @ [facility]: No PET evidence of metabolically active disease. Tiny left lower lobe nodule without abnormal uptake, however, it is too small to characterize by PET and should be monitor.  1/11/2024 CT Low Dose Lung Screening @ [facility]:Previously noted anterior right upper lobe spiculated nodule is again appreciated and is unchanged in size and appearance.  Previously seen groundglass nodule posterior right upper lobe is no longer identified.  Pleural-based nodule noted in the lingula is unchanged in size and appearance.  Lung rads category 2-benign appearance or behavior  1/16/25 CT Low Dose Lung Screening @ [facility]:  Significant changes in the appearance of the spiculated nodule in the anterior right upper lobe when compared with the 2 previous examinations.  No measures 10.3 than 19.3  mm with spiculation extending to the pleural surface anteriorly and medially.  Associated atelectasis in the anteromedial right upper lobe.  Previously seen juxtapleural nodule in the lingula is unchanged in size and appearance. Lung rads category 4A-suspicious abnormality.  PET-CT and/or biopsy considered  3/3/25 PET/CT [facility]: Mild uptake has developed in the right pulmonary hilum with SUV at 3.9, which compared to the uptake in the left pulmonary hilum with peak SUV of 2.6.  Uptake measured 1.3 cm.  Hypermetabolic spiculated nodule developing the anterior medially right upper lobe measuring 1.7 cm with SUV of 8.7, highly concerning for primary lung cancer.  Impression new hypermetabolic spiculated nodule in the anteromedial right upper lobe, concerning of malignancy.  New mild uptake in the right pulmonary hilum which is somewhat concerning and should be closely monitored.  No other suspicious uptake  6/24/25 PET/CT [facility]:  Interval postsurgical changes of right upper lobectomy without abnormal uptake to suggest metabolically-active residual/recurrent or metastatic disease  Interval resolution of mild uptake in the right pulmonary hilum  No new suspicious uptake    Pathology:  3/21/25:  10R:  Negative for malignancy.  Cellular smear showing benign bronchial epithelial cells and lymphoid tissue admixed with hemosiderin laden macrophage.    4/23/25 RUL Lobectomy:  1. LYMPH NODES, 10R, EXCISION:    - THREE LYMPH NODES, NEGATIVE FOR METASTATIC CARCINOMA.   2. LUNG, RIGHT UPPER LOBE, LOBECTOMY:    - COMBINED LARGE CELL NEUROENDOCRINE AND SQUAMOUS CELL CARCINOMA,   - ONE HILAR LYMPH NODE, NEGATIVE FOR METASTATIC CARCINOMA.   3. LYMPH NODE 11R, EXCISION:    - ONE LYMPH NODE, NEGATIVE FOR METASTATIC CARCINOMA   G3, pT2a pN0  PDL-1: 1%      [place] 5/9/2025:  CDKN2A ( Pathogenic ) p.R99P - c.296G>C Missense variant - LOFVAF: 17.1%   FGFR1 ( Pathogenic ) FGFR1 Copy number gain   PTEN ( Pathogenic ) p.E307* -  c.919G>T Stop gain - LOFVAF: 25.8%   TP53 ( Pathogenic ) p.R249M - c.746G>T Missense variant - LOF    HER2 Result NEGATIVE   Score 0         CLINICAL HISTORY:       Patient: Jocelyne Dickinson is a 60 y.o. female with history of diabetes, COPD, hypertension, hyperlipidemia, smoker (now vaping) kindly referred for newly diagnosed non-small-cell lung carcinoma.    Patient was undergoing low-dose CT scan lung cancer screening and most recent showed significant changes in a spiculated nodule in the right upper lobe.  PET-CT was performed that showed activity in that area but no distant metastases.    Patient underwent VATS right thoracotomy on 4/23/2025 by Dr. Lewis Lees.  Pathology revealed combined large cell neuroendocrine and squamous cell carcinoma.  All lymph nodes were negative for metastatic disease with a pathologic staging of 1B (T2aN0). Patient was presented at the multidisciplinary tumor board on 05/14/2025 and recommendations to repeat PET-CT or MRI and for adjuvant chemotherapy.  No role for radiation therapy.    Patient is here today and has been healing well from the surgery.  She does report some wheezing for about a with secondary to allergies.  No coughing.  No shortness of breath.  No chest pain.  No fever, chills, sweats.  She is here with her sister that has history of lung cancer.  Father had history of throat cancer.  They will also smoker.  Brother with renal cell carcinoma (no smoker).    Chief Complaint: Neuroendocine (F/u with labs-- Pt reports no new concerns )      Interval History:  She return to the clinic today for a follow-up and treatment clearance for C2 of carboplatin/etoposide, switch from cisplatin.  She feels well today.  Carboplatin was discussed in detail with patient and family.  All risk discussed in detail.  She verbalized understanding.  Chemo education handout was given to patixavi and consent was signed.  She continues to work daily.  Muscle spasms to neck controlled with  flexeril as needed.   She will follow-up with ENT as needed.   She denies any SOB, CP, headache, rash, neuropathy, fever, or chills.     Past Medical History:   Diagnosis Date    Abdominal pain     resolved    Anxiety disorder, unspecified     Carcinoma     Carotid artery stenosis     Cellulitis of scalp     resolved    Constipation     COPD (chronic obstructive pulmonary disease)     Diabetes mellitus     Digestive disorder     acid reflux    Disorder of pancreas     Disorder of pancreatic duct     Emphysema, unspecified     Family history of adverse effect to anesthesia     Fatigue     Gastrointestinal tract imaging abnormality     GERD (gastroesophageal reflux disease)     HTN (hypertension)     Insomnia     Mixed hyperlipidemia     Neuropathy     Nodule of apex of right lung     Osteoarthritis     Pancreatitis     Personal history of nicotine dependence     Pneumonia, unspecified organism     Shortness of breath     SOB (shortness of breath) on exertion     Syncope     Weight loss       Past Surgical History:   Procedure Laterality Date    BRONCHOSCOPY  03/19/2025    [facility]-    CATARACT EXTRACTION      COLONOSCOPY  07/02/2021    Dr. Nila Sharma    EGD, WITH CLOSED BIOPSY  03/12/2024    Procedure: EGD, WITH CLOSED BIOPSY;  Surgeon: Diego Atkinson MD;  Location: [facility];  Service: Gastroenterology;;  CBD 5 mm, HOP Cyst 4.5mm x 5 mm, Chronic Pancreatitis, PD Head 2.2 mm, PD Body 4.2 mm,    EYE SURGERY  2022    Cataract surgery on both eyes    INSERTION OF TUNNELED CENTRAL VENOUS CATHETER (CVC) WITH SUBCUTANEOUS PORT N/A 6/18/2025    Procedure: LUIRYIUOC-LNNX-G-CATH;  Surgeon: Lewis Lees MD;  Location: [facility] OR;  Service: Cardiovascular;  Laterality: N/A;  requests 700    REPAIR OF CAROTID ARTERY      THORACOTOMY Right 4/23/2025    Procedure: THORACOTOMY;  Surgeon: Lewis Lees MD;  Location: [facility] OR;  Service: Cardiovascular;  Laterality: Right;    TONSILLECTOMY  1969    I was 4  years old when tonsils were removed    ULTRASOUND, ENDOSCOPIC, WITH FINE NEEDLE ASPIRATION N/A 03/12/2024    Procedure: UPPER EUS W/ POSS FNA;  Surgeon: Diego Atkinosn MD;  Location: [facility] OR;  Service: Gastroenterology;  Laterality: N/A;  MRI abd- Limited exam due to image quality. Overall pancreatic atrophy. MAin PD dilation to 9mm in the tail with dialtied side branches. Rerlative abrupt caliber change of main PD at body and tail junction.    VIDEO-ASSISTED THORACOSCOPIC SURGERY (VATS) Right 4/23/2025    Procedure: VATS (VIDEO-ASSISTED THORACOSCOPIC SURGERY);  Surgeon: Lewis Lees MD;  Location: [facility] OR;  Service: Cardiovascular;  Laterality: Right;  RIGHT VATS, RIGHT UPPER LOBECTOMY    WRIST SURGERY Bilateral      Family History   Problem Relation Name Age of Onset    Hypertension Mother      Hypertension Father Yordy Teena     Heart disease Father Yordy Teena     Cancer Father Yordy Teena     Stroke Father Yordy Teena     Cancer Maternal Grandmother      Vision loss Maternal Grandmother      Cancer Sister Maribel Dickinson Arcement 50 - 59    Cancer Brother Moustapha Dickinson 60 - 69          Review of patient's allergies indicates:   Allergen Reactions    Aller ext-american cockroach Itching and Other (See Comments)    Allerg ext-tree poll-red maple Itching and Other (See Comments)    Cat hair standardized allergenic extract Itching and Other (See Comments)    Dog hair standardized allergenic extract Itching and Other (See Comments)    Grass pollen-ruslan, standard Itching and Other (See Comments)    Horse dander Itching and Other (See Comments)    Tramadol Itching    Tree pollen-box elder Itching and Other (See Comments)    Tree pollen-pecan Itching and Other (See Comments)    Trulicity [dulaglutide] Other (See Comments)     Patient had pancreatitis        Medications Ordered Prior to Encounter[1]   Review of Systems   Constitutional:  Negative for activity change, appetite change, chills, fatigue,  "fever, night sweats and unexpected weight change.   HENT:  Positive for hearing loss and tinnitus. Negative for mouth dryness, mouth sores, nosebleeds, sore throat and trouble swallowing.    Eyes: Negative.  Negative for visual disturbance.   Respiratory:  Negative for cough and shortness of breath.    Cardiovascular:  Negative for chest pain, palpitations and leg swelling.   Gastrointestinal:  Negative for abdominal distention, abdominal pain, blood in stool, change in bowel habit, constipation, diarrhea, nausea and vomiting.   Endocrine: Negative.    Genitourinary:  Negative for dysuria, frequency, hematuria and urgency.   Musculoskeletal:  Negative for arthralgias, back pain, myalgias and neck pain.   Integumentary:  Negative for rash.   Neurological:  Negative for dizziness, tremors, syncope, speech difficulty, weakness, light-headedness, numbness, headaches and memory loss.   Hematological:  Negative for adenopathy. Does not bruise/bleed easily.   Psychiatric/Behavioral:  Negative for confusion and suicidal ideas. The patient is not nervous/anxious.               Vitals:    08/04/25 0813   BP: 130/71   BP Location: Left arm   Patient Position: Sitting   Pulse: 61   Resp: 20   Temp: 98.4 °F (36.9 °C)   SpO2: 98%   Weight: 66 kg (145 lb 6.4 oz)   Height: 5' 4.02" (1.626 m)            Wt Readings from Last 6 Encounters:   08/04/25 65 kg (143 lb 4.8 oz)   08/04/25 66 kg (145 lb 6.4 oz)   07/22/25 66.5 kg (146 lb 11.2 oz)   07/07/25 65.6 kg (144 lb 9.6 oz)   07/03/25 65 kg (143 lb 4.8 oz)   07/02/25 65 kg (143 lb 4.8 oz)     Body mass index is 24.95 kg/m².  Body surface area is 1.73 meters squared.  Physical Exam  Vitals and nursing note reviewed. Exam conducted with a chaperone present.   Constitutional:       General: She is not in acute distress.     Appearance: Normal appearance. She is well-developed.   HENT:      Head: Normocephalic and atraumatic.      Mouth/Throat:      Mouth: Mucous membranes are moist. "   Eyes:      General: No scleral icterus.     Extraocular Movements: Extraocular movements intact.      Conjunctiva/sclera: Conjunctivae normal.      Pupils: Pupils are equal, round, and reactive to light.   Neck:      Vascular: No JVD.   Cardiovascular:      Rate and Rhythm: Normal rate and regular rhythm.      Heart sounds: No murmur heard.  Pulmonary:      Effort: Pulmonary effort is normal.      Breath sounds: No wheezing or rhonchi.   Abdominal:      General: Bowel sounds are normal. There is no distension.      Palpations: Abdomen is soft. There is no mass.      Tenderness: There is no abdominal tenderness.   Musculoskeletal:         General: No swelling or deformity.      Cervical back: Neck supple.   Lymphadenopathy:      Head:      Right side of head: No submental or submandibular adenopathy.      Left side of head: No submental or submandibular adenopathy.      Cervical: No cervical adenopathy.      Lower Body: No right inguinal adenopathy. No left inguinal adenopathy.   Skin:     General: Skin is warm.      Coloration: Skin is not jaundiced.      Findings: No lesion or rash.      Nails: There is no clubbing.   Neurological:      General: No focal deficit present.      Mental Status: She is alert and oriented to person, place, and time.      Cranial Nerves: Cranial nerves 2-12 are intact.   Psychiatric:         Attention and Perception: Attention normal.         Behavior: Behavior is cooperative.         Judgment: Judgment normal.       ECOG SCORE    1 - Restricted in strenuous activity-ambulatory and able to carry out work of a light nature         Laboratory:  CBC with Differential:  Lab Results   Component Value Date    WBC 3.89 (L) 08/04/2025    RBC 3.66 (L) 08/04/2025    HGB 10.6 (L) 08/04/2025    HCT 34.2 (L) 08/04/2025    MCV 93.4 08/04/2025    MCH 29.0 08/04/2025    MCHC 31.0 (L) 08/04/2025    RDW 16.0 08/04/2025     08/04/2025    MPV 10.8 (H) 08/04/2025        CMP:  Sodium   Date Value Ref  Range Status   08/04/2025 142 136 - 145 mmol/L Final   04/21/2021 143 136 - 145 mmol/L Final     Potassium   Date Value Ref Range Status   08/04/2025 4.5 3.5 - 5.1 mmol/L Final   04/21/2021 4.5 3.5 - 5.1 mmol/L Final     Chloride   Date Value Ref Range Status   08/04/2025 112 (H) 98 - 107 mmol/L Final   04/21/2021 106 100 - 109 mmol/L Final     CO2   Date Value Ref Range Status   08/04/2025 24 22 - 29 mmol/L Final     Carbon Dioxide   Date Value Ref Range Status   04/21/2021 31 22 - 33 mmol/L Final     Glucose   Date Value Ref Range Status   08/04/2025 191 (H) 74 - 100 mg/dL Final     Blood Urea Nitrogen   Date Value Ref Range Status   08/04/2025 16.0 9.8 - 20.1 mg/dL Final   04/21/2021 10 7 - 18 mg/dL Final     Creatinine   Date Value Ref Range Status   08/04/2025 0.75 0.55 - 1.02 mg/dL Final   04/21/2021 0.90 0.57 - 1.25 mg/dL Final     Calcium   Date Value Ref Range Status   08/04/2025 8.6 8.4 - 10.2 mg/dL Final   04/21/2021 8.8 8.8 - 10.6 mg/dL Final     Protein Total   Date Value Ref Range Status   08/04/2025 6.0 (L) 6.4 - 8.3 gm/dL Final     Albumin   Date Value Ref Range Status   08/04/2025 3.3 (L) 3.5 - 5.0 g/dL Final     Bilirubin Total   Date Value Ref Range Status   08/04/2025 0.3 <=1.5 mg/dL Final     ALP   Date Value Ref Range Status   08/04/2025 105 40 - 150 unit/L Final     AST   Date Value Ref Range Status   08/04/2025 13 11 - 45 unit/L Final     ALT   Date Value Ref Range Status   08/04/2025 8 0 - 55 unit/L Final     Anion Gap   Date Value Ref Range Status   04/21/2021 6 (L) 8 - 16 mmol/L Final     eGFR    Date Value Ref Range Status   04/21/2021 79 >=60 mL/min/1.73mSq Final     Estimated GFR-Non    Date Value Ref Range Status   04/30/2022 58               Assessment:       1. Large cell neuroendocrine carcinoma    2. 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 Squamous cell lung cancer, right    4. Muscle spasms of neck    5. Malignant neoplasm  of unspecified part of unspecified bronchus or lung    6. 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 Lewis Lees  --NCCN guidelines for stage IB, margins neg (R0): Observe vs adjuvant systemic chemotherapy  I explained to the patient that she has a combined carcinoma.  Pathology came back as: Large cell neuroendocrine carcinoma and squamous cell carcinoma and both are subtypes of non-small cell lung cancer (NSCLC), but they differ significantly in epidemiology, biology, and therapeutic approach.   Large cell neuroendocrine carcinoma is rare but highly aggressive, accounting for approximately 3% of lung cancers.  Squamous cell carcinoma is more common, representing 20-30% of NSCLC cases.   Comprehensive molecular testing for actionable  mutations (e.g., EGFR, ALK, ROS1, BRAF) and PD-L1 expression is recommended in all advanced NSCLC, This was done and pending.   For large cell neuroendocrine carcinoma, treatment is stage-dependent. In early-stage (I-III) disease, surgical resection is preferred for operable patients. However, due to the high risk of recurrence, adjuvant platinum-etoposide chemotherapy is recommended even for stage I disease.  She was started on cisplatin/etoposide. Discussed with Dr. Powers. Due to ototoxicity , will hold any further treatment and proceed with carboplatin with AUC of 6 following audiogram for baseline.   Audiogram reviewed, continue with carboplatin with AUC of 6/etoposide, day for growth factor to be given in [place].        Plan:   Labs and exam stable.    Toxicity review, continue with cycle 2 carboplatin/etoposide.  Lab only in one-week in [place].  Continue to follow ENT as needed, Dr. Gardiner  Continue with Dr. Lees.  Repeat PET following C2. Ordered today for 8/22/2025.  Scans are completed at oncologics in [facility]  [place]  RTC in 3 weeks with MD for follow up/labs/treat/scan review      SD Quevedo  Oncology/Hematology   [facility] personally reviewed all pertinent imaging, lab results, explained to the patient the pertinent information in detail including radiographic imaging, abnormal lab results. All questions were answered thoroughly. Total time spent on this visit was >35 minutes.     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                  [1]   Current Outpatient Medications on File Prior to Visit   Medication Sig Dispense Refill    acetaminophen (TYLENOL) 500 MG tablet Take 1,000 mg by mouth nightly as needed for Pain.      albuterol (PROVENTIL/VENTOLIN HFA) 90 mcg/actuation inhaler inhale TWO puffs EVERY 4 TO 6 HOURS AS NEEDED FOR wheezing 6.7 g 6    ALPRAZolam (XANAX) 0.5 MG tablet Take 1 tablet (0.5 mg total) by mouth 2 (two) times daily. 60 tablet 5    atorvastatin (LIPITOR) 40 MG tablet Take 40 mg by mouth once daily.      azelastine (ASTELIN) 137 mcg (0.1 %) nasal spray 1 spray by Nasal route daily as needed for Rhinitis.      b complex vitamins tablet Take 1 tablet by mouth once daily.      blood-glucose meter,continuous (DEXCOM G7 ) Misc 1 each by Misc.(Non-Drug; Combo Route) route continuous. 1 each 0    blood-glucose sensor (DEXCOM G7 SENSOR) Kristen 1 each by Misc.(Non-Drug; Combo Route) route every 10 days. 5 each 11    budesonide-formoterol 160-4.5 mcg (SYMBICORT) 160-4.5 mcg/actuation HFAA INHALE TWO PUFFS TWICE A DAY 10.2 g 6    citalopram (CELEXA) 10 MG tablet TAKE ONE TABLET BY MOUTH ONCE DAILY WITH 20 MG 30 tablet 5    citalopram  "(CELEXA) 20 MG tablet Take 1 tablet (20 mg total) by mouth once daily. 30 tablet 5    clopidogreL (PLAVIX) 75 mg tablet TAKE ONE TABLET BY MOUTH DAILY 30 tablet 4    dexAMETHasone (DECADRON) 4 MG Tab Take 2 tablets (8 mg total) by mouth once daily. on days 2-4 of each chemotherapy cycle. 6 tablet 5    docusate sodium (COLACE) 100 MG capsule Take 1 capsule (100 mg total) by mouth 2 (two) times daily. 60 capsule 5    esomeprazole (NEXIUM) 40 MG capsule TAKE ONE CAPSULE BY MOUTH BEFORE breakfast 30 capsule 9    gabapentin (NEURONTIN) 300 MG capsule TAKE ONE CAPSULE BY MOUTH EVERY EVENING 30 capsule 11    glimepiride (AMARYL) 2 MG tablet TAKE ONE TABLET BY MOUTH TWICE DAILY FOR DIABETES 180 tablet 5    hydrocortisone 2.5 % cream SMARTSIG:sparingly Topical 3 Times Daily      hydrOXYzine pamoate (VISTARIL) 25 MG Cap TAKE ONE CAPSULE BY MOUTH DAILY 30 capsule 5    ibuprofen (ADVIL,MOTRIN) 800 MG tablet Take 800 mg by mouth daily as needed for Pain.      insulin aspart U-100 (NOVOLOG FLEXPEN U-100 INSULIN) 100 unit/mL (3 mL) InPn pen Inject 6 Units into the skin 3 (three) times daily with meals. 5.4 mL 11    insulin glargine U-100, Lantus, (LANTUS SOLOSTAR U-100 INSULIN) 100 unit/mL (3 mL) InPn pen Inject 30 Units into the skin once daily.      linaCLOtide (LINZESS) 145 mcg Cap capsule Take 145 mcg by mouth before breakfast.      losartan (COZAAR) 100 MG tablet TAKE 1 TABLET BY MOUTH DAILY FOR BLOOD PRESSURE 30 tablet 5    mirtazapine (REMERON) 15 MG tablet TAKE 1/2 TABLET BY MOUTH DAILY EVERY EVENING 15 tablet 11    NIFEdipine (PROCARDIA-XL) 60 MG (OSM) 24 hr tablet TAKE ONE TABLET BY MOUTH DAILY FOR BLOOD PRESSURE 30 tablet 5    OLANZapine (ZYPREXA) 5 MG tablet Take 1 tablet (5 mg total) by mouth every evening. on days 1-4 of each chemotherapy cycle. 4 tablet 5    pen needle, diabetic 31 gauge x 3/16" Ndle 1 Pen by Misc.(Non-Drug; Combo Route) route Daily. 100 each 1    pioglitazone (ACTOS) 30 MG tablet TAKE ONE TABLET BY " MOUTH DAILY 30 tablet 2    prochlorperazine (COMPAZINE) 5 MG tablet Take 2 tablets (10 mg total) by mouth every 6 (six) hours as needed for Nausea. 20 tablet 5    SPIRIVA WITH HANDIHALER 18 mcg inhalation capsule Inhale 1 capsule into the lungs Daily.      sucralfate (CARAFATE) 100 mg/mL suspension Take 10 mLs (1 g total) by mouth as needed. 414 mL 6    TRADJENTA 5 mg Tab tablet TAKE ONE TABLET BY MOUTH DAILY 30 tablet 5     No current facility-administered medications on file prior to visit.

## 2025-08-05 ENCOUNTER — PATIENT MESSAGE (OUTPATIENT)
Facility: CLINIC | Age: 60
End: 2025-08-05
Payer: COMMERCIAL

## 2025-08-05 ENCOUNTER — INFUSION (OUTPATIENT)
Facility: HOSPITAL | Age: 60
End: 2025-08-05
Attending: FAMILY MEDICINE
Payer: COMMERCIAL

## 2025-08-05 VITALS
SYSTOLIC BLOOD PRESSURE: 155 MMHG | HEIGHT: 64 IN | DIASTOLIC BLOOD PRESSURE: 68 MMHG | WEIGHT: 144.81 LBS | TEMPERATURE: 98 F | BODY MASS INDEX: 24.72 KG/M2 | HEART RATE: 76 BPM | OXYGEN SATURATION: 100 %

## 2025-08-05 DIAGNOSIS — J44.0 CHRONIC OBSTRUCTIVE PULMONARY DISEASE WITH (ACUTE) LOWER RESPIRATORY INFECTION: ICD-10-CM

## 2025-08-05 DIAGNOSIS — Z79.4 TYPE 2 DIABETES MELLITUS WITH HYPERGLYCEMIA, WITH LONG-TERM CURRENT USE OF INSULIN: ICD-10-CM

## 2025-08-05 DIAGNOSIS — C34.91 SQUAMOUS CELL LUNG CANCER, RIGHT: ICD-10-CM

## 2025-08-05 DIAGNOSIS — M62.838 MUSCLE SPASMS OF NECK: ICD-10-CM

## 2025-08-05 DIAGNOSIS — E11.65 TYPE 2 DIABETES MELLITUS WITH HYPERGLYCEMIA, WITH LONG-TERM CURRENT USE OF INSULIN: ICD-10-CM

## 2025-08-05 DIAGNOSIS — C7A.1 LARGE CELL NEUROENDOCRINE CARCINOMA OF RIGHT MAIN BRONCHUS: Primary | ICD-10-CM

## 2025-08-05 PROCEDURE — 25000003 PHARM REV CODE 250: Performed by: NURSE PRACTITIONER

## 2025-08-05 PROCEDURE — A4216 STERILE WATER/SALINE, 10 ML: HCPCS | Performed by: NURSE PRACTITIONER

## 2025-08-05 PROCEDURE — 96413 CHEMO IV INFUSION 1 HR: CPT

## 2025-08-05 PROCEDURE — 63600175 PHARM REV CODE 636 W HCPCS: Performed by: NURSE PRACTITIONER

## 2025-08-05 RX ORDER — CYCLOBENZAPRINE HYDROCHLORIDE 7.5 MG/1
TABLET, FILM COATED ORAL
Qty: 30 TABLET | Refills: 3 | Status: SHIPPED | OUTPATIENT
Start: 2025-08-05

## 2025-08-05 RX ORDER — ALBUTEROL SULFATE 90 UG/1
INHALANT RESPIRATORY (INHALATION)
Qty: 6.7 G | Refills: 6 | Status: SHIPPED | OUTPATIENT
Start: 2025-08-05

## 2025-08-05 RX ORDER — SODIUM CHLORIDE 0.9 % (FLUSH) 0.9 %
10 SYRINGE (ML) INJECTION
Status: DISCONTINUED | OUTPATIENT
Start: 2025-08-05 | End: 2025-08-05 | Stop reason: HOSPADM

## 2025-08-05 RX ORDER — INSULIN ASPART 100 [IU]/ML
INJECTION, SOLUTION INTRAVENOUS; SUBCUTANEOUS
Qty: 15 ML | Refills: 2 | Status: SHIPPED | OUTPATIENT
Start: 2025-08-05

## 2025-08-05 RX ADMIN — ETOPOSIDE 174 MG: 20 INJECTION INTRAVENOUS at 10:08

## 2025-08-05 RX ADMIN — Medication 10 ML: at 11:08

## 2025-08-05 RX ADMIN — SODIUM CHLORIDE: 9 INJECTION, SOLUTION INTRAVENOUS at 10:08

## 2025-08-06 ENCOUNTER — INFUSION (OUTPATIENT)
Facility: HOSPITAL | Age: 60
End: 2025-08-06
Attending: FAMILY MEDICINE
Payer: COMMERCIAL

## 2025-08-06 VITALS
HEART RATE: 70 BPM | SYSTOLIC BLOOD PRESSURE: 167 MMHG | TEMPERATURE: 98 F | RESPIRATION RATE: 20 BRPM | DIASTOLIC BLOOD PRESSURE: 73 MMHG | HEIGHT: 64 IN | OXYGEN SATURATION: 97 % | WEIGHT: 145.5 LBS | BODY MASS INDEX: 24.84 KG/M2

## 2025-08-06 DIAGNOSIS — C7A.1 LARGE CELL NEUROENDOCRINE CARCINOMA OF RIGHT MAIN BRONCHUS: Primary | ICD-10-CM

## 2025-08-06 DIAGNOSIS — C34.91 SQUAMOUS CELL LUNG CANCER, RIGHT: ICD-10-CM

## 2025-08-06 PROCEDURE — 96413 CHEMO IV INFUSION 1 HR: CPT

## 2025-08-06 PROCEDURE — 63600175 PHARM REV CODE 636 W HCPCS: Performed by: NURSE PRACTITIONER

## 2025-08-06 PROCEDURE — 25000003 PHARM REV CODE 250: Performed by: NURSE PRACTITIONER

## 2025-08-06 RX ORDER — SODIUM CHLORIDE 0.9 % (FLUSH) 0.9 %
10 SYRINGE (ML) INJECTION
Status: DISCONTINUED | OUTPATIENT
Start: 2025-08-06 | End: 2025-08-06 | Stop reason: HOSPADM

## 2025-08-06 RX ORDER — EPINEPHRINE 0.3 MG/.3ML
0.3 INJECTION SUBCUTANEOUS ONCE AS NEEDED
Status: DISCONTINUED | OUTPATIENT
Start: 2025-08-06 | End: 2025-08-06 | Stop reason: HOSPADM

## 2025-08-06 RX ORDER — HEPARIN 100 UNIT/ML
500 SYRINGE INTRAVENOUS
Status: DISCONTINUED | OUTPATIENT
Start: 2025-08-06 | End: 2025-08-06 | Stop reason: HOSPADM

## 2025-08-06 RX ORDER — DIPHENHYDRAMINE HYDROCHLORIDE 50 MG/ML
50 INJECTION, SOLUTION INTRAMUSCULAR; INTRAVENOUS ONCE AS NEEDED
Status: DISCONTINUED | OUTPATIENT
Start: 2025-08-06 | End: 2025-08-06 | Stop reason: HOSPADM

## 2025-08-06 RX ORDER — PROCHLORPERAZINE EDISYLATE 5 MG/ML
10 INJECTION INTRAMUSCULAR; INTRAVENOUS ONCE AS NEEDED
Status: DISCONTINUED | OUTPATIENT
Start: 2025-08-06 | End: 2025-08-06 | Stop reason: HOSPADM

## 2025-08-06 RX ADMIN — ETOPOSIDE 174 MG: 20 INJECTION INTRAVENOUS at 09:08

## 2025-08-06 RX ADMIN — SODIUM CHLORIDE: 9 INJECTION, SOLUTION INTRAVENOUS at 09:08

## 2025-08-06 RX ADMIN — HEPARIN SODIUM (PORCINE) LOCK FLUSH IV SOLN 100 UNIT/ML 500 UNITS: 100 SOLUTION at 10:08

## 2025-08-08 ENCOUNTER — INFUSION (OUTPATIENT)
Dept: INFUSION THERAPY | Facility: HOSPITAL | Age: 60
End: 2025-08-08
Payer: COMMERCIAL

## 2025-08-08 VITALS
TEMPERATURE: 98 F | OXYGEN SATURATION: 100 % | RESPIRATION RATE: 18 BRPM | HEART RATE: 55 BPM | HEIGHT: 64 IN | BODY MASS INDEX: 24.84 KG/M2 | WEIGHT: 145.5 LBS | DIASTOLIC BLOOD PRESSURE: 71 MMHG | SYSTOLIC BLOOD PRESSURE: 167 MMHG

## 2025-08-08 DIAGNOSIS — C7A.1 LARGE CELL NEUROENDOCRINE CARCINOMA OF RIGHT MAIN BRONCHUS: Primary | ICD-10-CM

## 2025-08-08 PROCEDURE — 96372 THER/PROPH/DIAG INJ SC/IM: CPT

## 2025-08-08 PROCEDURE — 63600175 PHARM REV CODE 636 W HCPCS: Mod: JZ,TB

## 2025-08-08 RX ADMIN — PEGFILGRASTIM-CBQV 6 MG: 6 INJECTION, SOLUTION SUBCUTANEOUS at 10:08

## 2025-08-08 NOTE — PROGRESS NOTES
Patient denied any adverse reactions to injection.  Discharge instruction explained, questions answered, patient verbalized understanding.  Patient declined a copy of AVS.  Patient discharged in stable condition with no complaints.

## 2025-08-08 NOTE — PROGRESS NOTES
1045 Educated patient on Udenyca side effects, and adverse reactions to injection.  Answered patients questions, patient verbalized understanding.  Gave patient a education handout on Udenyca.

## 2025-08-08 NOTE — PLAN OF CARE
Problem: Adult Inpatient Plan of Care  Goal: Plan of Care Review  Outcome: Met  Goal: Patient-Specific Goal (Individualized)  Outcome: Met     Problem: Infection  Goal: Absence of Infection Signs and Symptoms  Outcome: Met   Administered ordered Udenyca and monitored patient for any adverse reactions.

## 2025-08-11 ENCOUNTER — LAB VISIT (OUTPATIENT)
Dept: LAB | Facility: HOSPITAL | Age: 60
End: 2025-08-11
Payer: COMMERCIAL

## 2025-08-11 ENCOUNTER — TELEPHONE (OUTPATIENT)
Dept: PHARMACY | Facility: CLINIC | Age: 60
End: 2025-08-11
Payer: COMMERCIAL

## 2025-08-11 DIAGNOSIS — C34.91 SQUAMOUS CELL LUNG CANCER, RIGHT: ICD-10-CM

## 2025-08-11 DIAGNOSIS — C7A.1 LARGE CELL NEUROENDOCRINE CARCINOMA OF RIGHT MAIN BRONCHUS: ICD-10-CM

## 2025-08-11 DIAGNOSIS — C7A.1 LARGE CELL NEUROENDOCRINE CARCINOMA: ICD-10-CM

## 2025-08-11 LAB
ABS NEUT CALC (OHS): 8.93 X10(3)/MCL (ref 2.1–9.2)
ALBUMIN SERPL-MCNC: 3.6 G/DL (ref 3.5–5)
ALBUMIN/GLOB SERPL: 1.3 RATIO (ref 1.1–2)
ALP SERPL-CCNC: 138 UNIT/L (ref 40–150)
ALT SERPL-CCNC: 12 UNIT/L (ref 0–55)
ANION GAP SERPL CALC-SCNC: 9 MEQ/L
AST SERPL-CCNC: 16 UNIT/L (ref 11–45)
BILIRUB SERPL-MCNC: 0.5 MG/DL
BUN SERPL-MCNC: 12 MG/DL (ref 9.8–20.1)
CALCIUM SERPL-MCNC: 8.3 MG/DL (ref 8.4–10.2)
CEA SERPL-MCNC: <1.73 NG/ML (ref 0–3)
CHLORIDE SERPL-SCNC: 106 MMOL/L (ref 98–107)
CO2 SERPL-SCNC: 27 MMOL/L (ref 22–29)
CREAT SERPL-MCNC: 0.71 MG/DL (ref 0.55–1.02)
CREAT/UREA NIT SERPL: 17
EOSINOPHIL NFR BLD MANUAL: 0.23 X10(3)/MCL (ref 0–0.9)
EOSINOPHIL NFR BLD MANUAL: 2 % (ref 0–8)
ERYTHROCYTE [DISTWIDTH] IN BLOOD BY AUTOMATED COUNT: 15.7 % (ref 11.5–17)
GFR SERPLBLD CREATININE-BSD FMLA CKD-EPI: >60 ML/MIN/1.73/M2
GLOBULIN SER-MCNC: 2.7 GM/DL (ref 2.4–3.5)
GLUCOSE SERPL-MCNC: 78 MG/DL (ref 74–100)
HCT VFR BLD AUTO: 29.3 % (ref 37–47)
HGB BLD-MCNC: 9.4 G/DL (ref 12–16)
LYMPHOCYTES NFR BLD MANUAL: 2.44 X10(3)/MCL (ref 0.6–4.6)
LYMPHOCYTES NFR BLD MANUAL: 21 % (ref 13–40)
MAGNESIUM SERPL-MCNC: 2.1 MG/DL (ref 1.6–2.6)
MCH RBC QN AUTO: 29 PG (ref 27–31)
MCHC RBC AUTO-ENTMCNC: 32.1 G/DL (ref 33–36)
MCV RBC AUTO: 90.4 FL (ref 80–94)
MONOCYTES NFR BLD MANUAL: 0 % (ref 2–11)
MONOCYTES NFR BLD MANUAL: 0 X10(3)/MCL (ref 0.1–1.3)
NEUTROPHILS NFR BLD MANUAL: 76 % (ref 47–80)
NEUTS BAND NFR BLD MANUAL: 1 % (ref 0–11)
NRBC BLD AUTO-RTO: 0 %
PLATELET # BLD AUTO: 75 X10(3)/MCL (ref 130–400)
PLATELET # BLD EST: ABNORMAL 10*3/UL
PMV BLD AUTO: 12.2 FL (ref 7.4–10.4)
POTASSIUM SERPL-SCNC: 4.1 MMOL/L (ref 3.5–5.1)
PROT SERPL-MCNC: 6.3 GM/DL (ref 6.4–8.3)
RBC # BLD AUTO: 3.24 X10(6)/MCL (ref 4.2–5.4)
SODIUM SERPL-SCNC: 142 MMOL/L (ref 136–145)
WBC # BLD AUTO: 11.6 X10(3)/MCL (ref 4.5–11.5)

## 2025-08-11 PROCEDURE — 82378 CARCINOEMBRYONIC ANTIGEN: CPT

## 2025-08-11 PROCEDURE — 80053 COMPREHEN METABOLIC PANEL: CPT

## 2025-08-11 PROCEDURE — 85025 COMPLETE CBC W/AUTO DIFF WBC: CPT

## 2025-08-11 PROCEDURE — 83735 ASSAY OF MAGNESIUM: CPT

## 2025-08-11 PROCEDURE — 36415 COLL VENOUS BLD VENIPUNCTURE: CPT

## 2025-08-13 ENCOUNTER — TELEPHONE (OUTPATIENT)
Dept: PHARMACY | Facility: CLINIC | Age: 60
End: 2025-08-13
Payer: COMMERCIAL

## 2025-08-16 ENCOUNTER — DOCUMENT SCAN (OUTPATIENT)
Dept: HOME HEALTH SERVICES | Facility: HOSPITAL | Age: 60
End: 2025-08-16
Payer: COMMERCIAL

## 2025-08-25 ENCOUNTER — PATIENT MESSAGE (OUTPATIENT)
Facility: CLINIC | Age: 60
End: 2025-08-25
Payer: COMMERCIAL

## 2025-08-26 ENCOUNTER — INFUSION (OUTPATIENT)
Facility: HOSPITAL | Age: 60
End: 2025-08-26
Attending: FAMILY MEDICINE
Payer: COMMERCIAL

## 2025-08-26 ENCOUNTER — OFFICE VISIT (OUTPATIENT)
Facility: CLINIC | Age: 60
End: 2025-08-26
Payer: COMMERCIAL

## 2025-08-26 ENCOUNTER — LAB VISIT (OUTPATIENT)
Dept: LAB | Facility: HOSPITAL | Age: 60
End: 2025-08-26
Attending: INTERNAL MEDICINE
Payer: COMMERCIAL

## 2025-08-26 VITALS
HEART RATE: 75 BPM | OXYGEN SATURATION: 98 % | RESPIRATION RATE: 16 BRPM | DIASTOLIC BLOOD PRESSURE: 63 MMHG | HEIGHT: 64 IN | BODY MASS INDEX: 24.56 KG/M2 | TEMPERATURE: 97 F | WEIGHT: 143.88 LBS | SYSTOLIC BLOOD PRESSURE: 118 MMHG

## 2025-08-26 VITALS
DIASTOLIC BLOOD PRESSURE: 63 MMHG | HEART RATE: 75 BPM | OXYGEN SATURATION: 98 % | RESPIRATION RATE: 16 BRPM | WEIGHT: 143.88 LBS | HEIGHT: 64 IN | TEMPERATURE: 97 F | SYSTOLIC BLOOD PRESSURE: 118 MMHG | BODY MASS INDEX: 24.56 KG/M2

## 2025-08-26 DIAGNOSIS — C34.91 SQUAMOUS CELL LUNG CANCER, RIGHT: ICD-10-CM

## 2025-08-26 DIAGNOSIS — C7A.1 LARGE CELL NEUROENDOCRINE CARCINOMA OF RIGHT MAIN BRONCHUS: Primary | ICD-10-CM

## 2025-08-26 DIAGNOSIS — C34.91 SQUAMOUS CELL CARCINOMA OF RIGHT LUNG: Primary | ICD-10-CM

## 2025-08-26 DIAGNOSIS — Z79.69 IMMUNODEFICIENCY SECONDARY TO CHEMOTHERAPY: ICD-10-CM

## 2025-08-26 DIAGNOSIS — T45.1X5A IMMUNODEFICIENCY SECONDARY TO CHEMOTHERAPY: ICD-10-CM

## 2025-08-26 DIAGNOSIS — C7A.1 LARGE CELL NEUROENDOCRINE CARCINOMA OF RIGHT MAIN BRONCHUS: ICD-10-CM

## 2025-08-26 DIAGNOSIS — Z79.69 ON ANTINEOPLASTIC CHEMOTHERAPY: ICD-10-CM

## 2025-08-26 DIAGNOSIS — D84.821 IMMUNODEFICIENCY SECONDARY TO CHEMOTHERAPY: ICD-10-CM

## 2025-08-26 DIAGNOSIS — Z72.0 TOBACCO USER: ICD-10-CM

## 2025-08-26 PROCEDURE — 96367 TX/PROPH/DG ADDL SEQ IV INF: CPT

## 2025-08-26 PROCEDURE — 3052F HG A1C>EQUAL 8.0%<EQUAL 9.0%: CPT | Mod: CPTII,S$GLB,, | Performed by: INTERNAL MEDICINE

## 2025-08-26 PROCEDURE — 3074F SYST BP LT 130 MM HG: CPT | Mod: CPTII,S$GLB,, | Performed by: INTERNAL MEDICINE

## 2025-08-26 PROCEDURE — 96375 TX/PRO/DX INJ NEW DRUG ADDON: CPT

## 2025-08-26 PROCEDURE — 99215 OFFICE O/P EST HI 40 MIN: CPT | Mod: S$GLB,,, | Performed by: INTERNAL MEDICINE

## 2025-08-26 PROCEDURE — 99999 PR PBB SHADOW E&M-EST. PATIENT-LVL V: CPT | Mod: PBBFAC,,, | Performed by: INTERNAL MEDICINE

## 2025-08-26 PROCEDURE — 63600175 PHARM REV CODE 636 W HCPCS: Performed by: INTERNAL MEDICINE

## 2025-08-26 PROCEDURE — 3008F BODY MASS INDEX DOCD: CPT | Mod: CPTII,S$GLB,, | Performed by: INTERNAL MEDICINE

## 2025-08-26 PROCEDURE — A4216 STERILE WATER/SALINE, 10 ML: HCPCS | Performed by: INTERNAL MEDICINE

## 2025-08-26 PROCEDURE — 96413 CHEMO IV INFUSION 1 HR: CPT

## 2025-08-26 PROCEDURE — 3078F DIAST BP <80 MM HG: CPT | Mod: CPTII,S$GLB,, | Performed by: INTERNAL MEDICINE

## 2025-08-26 PROCEDURE — 25000003 PHARM REV CODE 250: Performed by: INTERNAL MEDICINE

## 2025-08-26 PROCEDURE — 96417 CHEMO IV INFUS EACH ADDL SEQ: CPT

## 2025-08-26 PROCEDURE — 4010F ACE/ARB THERAPY RXD/TAKEN: CPT | Mod: CPTII,S$GLB,, | Performed by: INTERNAL MEDICINE

## 2025-08-26 PROCEDURE — G2211 COMPLEX E/M VISIT ADD ON: HCPCS | Mod: S$GLB,,, | Performed by: INTERNAL MEDICINE

## 2025-08-26 RX ORDER — HEPARIN 100 UNIT/ML
500 SYRINGE INTRAVENOUS
Status: CANCELLED | OUTPATIENT
Start: 2025-08-26

## 2025-08-26 RX ORDER — EPINEPHRINE 0.3 MG/.3ML
0.3 INJECTION SUBCUTANEOUS ONCE AS NEEDED
Status: CANCELLED | OUTPATIENT
Start: 2025-08-26 | End: 2037-01-22

## 2025-08-26 RX ORDER — SODIUM CHLORIDE 0.9 % (FLUSH) 0.9 %
10 SYRINGE (ML) INJECTION
Status: CANCELLED | OUTPATIENT
Start: 2025-08-26

## 2025-08-26 RX ORDER — DIPHENHYDRAMINE HYDROCHLORIDE 50 MG/ML
50 INJECTION, SOLUTION INTRAMUSCULAR; INTRAVENOUS ONCE AS NEEDED
Status: CANCELLED | OUTPATIENT
Start: 2025-08-26 | End: 2037-01-22

## 2025-08-26 RX ORDER — EPINEPHRINE 0.3 MG/.3ML
0.3 INJECTION SUBCUTANEOUS ONCE AS NEEDED
OUTPATIENT
Start: 2025-08-26 | End: 2037-01-22

## 2025-08-26 RX ORDER — PROCHLORPERAZINE EDISYLATE 5 MG/ML
10 INJECTION INTRAMUSCULAR; INTRAVENOUS ONCE AS NEEDED
OUTPATIENT
Start: 2025-08-26

## 2025-08-26 RX ORDER — PROCHLORPERAZINE EDISYLATE 5 MG/ML
10 INJECTION INTRAMUSCULAR; INTRAVENOUS ONCE AS NEEDED
Status: CANCELLED | OUTPATIENT
Start: 2025-08-26

## 2025-08-26 RX ORDER — SODIUM CHLORIDE 0.9 % (FLUSH) 0.9 %
10 SYRINGE (ML) INJECTION
Status: DISCONTINUED | OUTPATIENT
Start: 2025-08-26 | End: 2025-08-26 | Stop reason: HOSPADM

## 2025-08-26 RX ORDER — DIPHENHYDRAMINE HYDROCHLORIDE 50 MG/ML
50 INJECTION, SOLUTION INTRAMUSCULAR; INTRAVENOUS ONCE AS NEEDED
OUTPATIENT
Start: 2025-08-26 | End: 2037-01-22

## 2025-08-26 RX ADMIN — ETOPOSIDE 174 MG: 20 INJECTION INTRAVENOUS at 11:08

## 2025-08-26 RX ADMIN — SODIUM CHLORIDE: 9 INJECTION, SOLUTION INTRAVENOUS at 10:08

## 2025-08-26 RX ADMIN — Medication 10 ML: at 12:08

## 2025-08-26 RX ADMIN — APREPITANT 130 MG: 130 INJECTION, EMULSION INTRAVENOUS at 09:08

## 2025-08-26 RX ADMIN — CARBOPLATIN 645 MG: 10 INJECTION, SOLUTION INTRAVENOUS at 10:08

## 2025-08-26 RX ADMIN — PALONOSETRON HYDROCHLORIDE 0.25 MG: 0.25 INJECTION, SOLUTION INTRAVENOUS at 10:08

## 2025-08-27 ENCOUNTER — INFUSION (OUTPATIENT)
Facility: HOSPITAL | Age: 60
End: 2025-08-27
Attending: FAMILY MEDICINE
Payer: COMMERCIAL

## 2025-08-27 VITALS
WEIGHT: 145 LBS | BODY MASS INDEX: 24.75 KG/M2 | TEMPERATURE: 98 F | RESPIRATION RATE: 16 BRPM | HEART RATE: 74 BPM | DIASTOLIC BLOOD PRESSURE: 78 MMHG | OXYGEN SATURATION: 98 % | HEIGHT: 64 IN | SYSTOLIC BLOOD PRESSURE: 128 MMHG

## 2025-08-27 DIAGNOSIS — C7A.1 LARGE CELL NEUROENDOCRINE CARCINOMA OF RIGHT MAIN BRONCHUS: Primary | ICD-10-CM

## 2025-08-27 DIAGNOSIS — C34.91 SQUAMOUS CELL LUNG CANCER, RIGHT: ICD-10-CM

## 2025-08-27 PROCEDURE — 96413 CHEMO IV INFUSION 1 HR: CPT

## 2025-08-27 PROCEDURE — A4216 STERILE WATER/SALINE, 10 ML: HCPCS | Performed by: INTERNAL MEDICINE

## 2025-08-27 PROCEDURE — 63600175 PHARM REV CODE 636 W HCPCS: Performed by: INTERNAL MEDICINE

## 2025-08-27 PROCEDURE — 25000003 PHARM REV CODE 250: Performed by: INTERNAL MEDICINE

## 2025-08-27 RX ORDER — SODIUM CHLORIDE 0.9 % (FLUSH) 0.9 %
10 SYRINGE (ML) INJECTION
Status: DISCONTINUED | OUTPATIENT
Start: 2025-08-27 | End: 2025-08-27 | Stop reason: HOSPADM

## 2025-08-27 RX ORDER — HEPARIN 100 UNIT/ML
500 SYRINGE INTRAVENOUS
Status: DISCONTINUED | OUTPATIENT
Start: 2025-08-27 | End: 2025-08-27 | Stop reason: HOSPADM

## 2025-08-27 RX ADMIN — Medication 10 ML: at 10:08

## 2025-08-27 RX ADMIN — ETOPOSIDE 174 MG: 20 INJECTION INTRAVENOUS at 09:08

## 2025-08-27 RX ADMIN — SODIUM CHLORIDE: 9 INJECTION, SOLUTION INTRAVENOUS at 09:08

## 2025-08-27 RX ADMIN — HEPARIN SODIUM (PORCINE) LOCK FLUSH IV SOLN 100 UNIT/ML 500 UNITS: 100 SOLUTION at 10:08

## 2025-08-28 ENCOUNTER — INFUSION (OUTPATIENT)
Facility: HOSPITAL | Age: 60
End: 2025-08-28
Attending: FAMILY MEDICINE
Payer: COMMERCIAL

## 2025-08-28 VITALS
BODY MASS INDEX: 24.92 KG/M2 | WEIGHT: 146 LBS | HEART RATE: 73 BPM | HEIGHT: 64 IN | TEMPERATURE: 98 F | OXYGEN SATURATION: 100 % | DIASTOLIC BLOOD PRESSURE: 61 MMHG | SYSTOLIC BLOOD PRESSURE: 142 MMHG

## 2025-08-28 DIAGNOSIS — C7A.1 LARGE CELL NEUROENDOCRINE CARCINOMA OF RIGHT MAIN BRONCHUS: Primary | ICD-10-CM

## 2025-08-28 DIAGNOSIS — C34.91 SQUAMOUS CELL LUNG CANCER, RIGHT: ICD-10-CM

## 2025-08-28 PROCEDURE — 96413 CHEMO IV INFUSION 1 HR: CPT

## 2025-08-28 PROCEDURE — 63600175 PHARM REV CODE 636 W HCPCS: Performed by: INTERNAL MEDICINE

## 2025-08-28 PROCEDURE — A4216 STERILE WATER/SALINE, 10 ML: HCPCS | Performed by: INTERNAL MEDICINE

## 2025-08-28 PROCEDURE — 25000003 PHARM REV CODE 250: Performed by: INTERNAL MEDICINE

## 2025-08-28 RX ORDER — HEPARIN 100 UNIT/ML
500 SYRINGE INTRAVENOUS
Status: DISCONTINUED | OUTPATIENT
Start: 2025-08-28 | End: 2025-08-28 | Stop reason: HOSPADM

## 2025-08-28 RX ORDER — SODIUM CHLORIDE 0.9 % (FLUSH) 0.9 %
10 SYRINGE (ML) INJECTION
Status: DISCONTINUED | OUTPATIENT
Start: 2025-08-28 | End: 2025-08-28 | Stop reason: HOSPADM

## 2025-08-28 RX ADMIN — SODIUM CHLORIDE: 9 INJECTION, SOLUTION INTRAVENOUS at 08:08

## 2025-08-28 RX ADMIN — HEPARIN SODIUM (PORCINE) LOCK FLUSH IV SOLN 100 UNIT/ML 500 UNITS: 100 SOLUTION at 10:08

## 2025-08-28 RX ADMIN — Medication 10 ML: at 10:08

## 2025-08-28 RX ADMIN — ETOPOSIDE 174 MG: 20 INJECTION, SOLUTION INTRAVENOUS at 09:08

## 2025-08-29 ENCOUNTER — INFUSION (OUTPATIENT)
Dept: INFUSION THERAPY | Facility: HOSPITAL | Age: 60
End: 2025-08-29
Payer: COMMERCIAL

## 2025-08-29 ENCOUNTER — EXTERNAL HOME HEALTH (OUTPATIENT)
Dept: HOME HEALTH SERVICES | Facility: HOSPITAL | Age: 60
End: 2025-08-29
Payer: COMMERCIAL

## 2025-08-29 VITALS
WEIGHT: 145.94 LBS | SYSTOLIC BLOOD PRESSURE: 114 MMHG | HEIGHT: 64 IN | BODY MASS INDEX: 24.92 KG/M2 | OXYGEN SATURATION: 99 % | TEMPERATURE: 98 F | HEART RATE: 84 BPM | DIASTOLIC BLOOD PRESSURE: 62 MMHG | RESPIRATION RATE: 18 BRPM

## 2025-08-29 DIAGNOSIS — C7A.1 LARGE CELL NEUROENDOCRINE CARCINOMA OF RIGHT MAIN BRONCHUS: Primary | ICD-10-CM

## 2025-08-29 PROCEDURE — 63600175 PHARM REV CODE 636 W HCPCS: Mod: JZ,TB

## 2025-08-29 PROCEDURE — 96372 THER/PROPH/DIAG INJ SC/IM: CPT

## 2025-08-29 RX ADMIN — PEGFILGRASTIM-CBQV 6 MG: 6 INJECTION, SOLUTION SUBCUTANEOUS at 10:08

## (undated) DEVICE — Device

## (undated) DEVICE — TRAY CATH 1-LYR URIMTR 16FR

## (undated) DEVICE — ELECTRODE BLD EXT 6.50 ST DISP

## (undated) DEVICE — BAG MEDI-PLAST DECANTER C-FLOW

## (undated) DEVICE — SUT ETHBND XTRA 1 OS-8 30IN

## (undated) DEVICE — COLLECTION SPECIMEN NEPTUNE

## (undated) DEVICE — SOL NACL IRR 1000ML BTL

## (undated) DEVICE — CUP PROFEX GLASS GRADUATE 1OZ

## (undated) DEVICE — GLOVE PROTEXIS BLUE LATEX 7

## (undated) DEVICE — DRAIN CHEST DRY SUCTION

## (undated) DEVICE — SOL 1L ACD-A

## (undated) DEVICE — SPONGE GAUZE 16PLY 4X4

## (undated) DEVICE — CATH KARDIA PERI HI FLO 24FR

## (undated) DEVICE — ELECTRODE PROPAD MULTI W/10FT

## (undated) DEVICE — DRAPE STERI LONG

## (undated) DEVICE — ELECTRODE E-CLEAN MOD 4IN

## (undated) DEVICE — TROCAR ENDOPATH XCEL 5X100MM

## (undated) DEVICE — SUT MONOCRYL PLUS UD 3-0 27

## (undated) DEVICE — GLOVE PROTEXIS HYDROGEL SZ6.5

## (undated) DEVICE — KIT CANIST SUCTION 1200CC

## (undated) DEVICE — CORD LAP 10 DISP

## (undated) DEVICE — CATH CUFF BLLN US RADIAL FLEX

## (undated) DEVICE — CONTAINER SPECIMEN SCREW 4OZ

## (undated) DEVICE — CANNULA ENDOPATH XCEL 5X100MM

## (undated) DEVICE — SOL NORMAL USPCA 0.9%

## (undated) DEVICE — TUBING O2 FEMALE CONN 13FT

## (undated) DEVICE — UNDERPAD DISPOSABLE 30X30IN

## (undated) DEVICE — CUSHION  WC FOAM 20X20X.75IN

## (undated) DEVICE — SCISSOR CURVED ENDOPATH 5MM

## (undated) DEVICE — GLOVE SURG BIOGEL LATEX SZ 7.5

## (undated) DEVICE — KIT SURGICAL TURNOVER

## (undated) DEVICE — PAD N ADH STRL 2X3IN

## (undated) DEVICE — SUT VICRYL 3-0 27 SH

## (undated) DEVICE — SUT PROLENE 2-0 SH 36IN BLU

## (undated) DEVICE — TROCAR ENDOPATH XCEL 12X100MM

## (undated) DEVICE — KIT ANTIFOG W/SPONG & FLUID

## (undated) DEVICE — SUT VICRYL 1 OB 36 CTX

## (undated) DEVICE — BOWL STERILE LARGE 32OZ

## (undated) DEVICE — GLOVE BIOGEL 7.5

## (undated) DEVICE — PROBE CRYOSPHERE MAL 11IN

## (undated) DEVICE — TIP SUCTION YANKAUER

## (undated) DEVICE — BLLN ULTRASONIC LINEAR FLEX

## (undated) DEVICE — DRESSING TRANS 4X4 TEGADERM

## (undated) DEVICE — TUBE SUCTION MEDI-VAC STERILE

## (undated) DEVICE — SUT VICRYL 2-0 36 CT-1

## (undated) DEVICE — ELECTRODE PATIENT RETURN DISP

## (undated) DEVICE — DRAPE TOP 53X102IN

## (undated) DEVICE — DRAPE INCISE IOBAN 2 23X23IN

## (undated) DEVICE — STAPLE TRI-STAPLE 2.0 45MM BLK

## (undated) DEVICE — BLADE SURG STAINLESS STEEL #11

## (undated) DEVICE — ELECTRODE UTAHLOOP EXT 10CM

## (undated) DEVICE — KIT SURGICAL COLON .25 1.1OZ

## (undated) DEVICE — HEMOSTAT SURGICEL 4X8IN

## (undated) DEVICE — SPONGE LAP 18X18 PREWASHED

## (undated) DEVICE — COVER PROBE US 5.5X58L NON LTX

## (undated) DEVICE — CLIP LIGATING MEDIUM

## (undated) DEVICE — BITE BLOCK ADULT LATEX FREE

## (undated) DEVICE — DEVICE TRI 2.0 SUL 30 CT ART

## (undated) DEVICE — PROGEL

## (undated) DEVICE — SOL CLEARIFY VISUALIZATION LAP

## (undated) DEVICE — BAG LABGUARD BIOHAZARD 6X9IN

## (undated) DEVICE — RELOAD ENDO GIA TRISTAPLE 45MM

## (undated) DEVICE — CLIP LIGATING HEMOCLP SMALL

## (undated) DEVICE — DRESSING TELFA + BARR 4X6IN

## (undated) DEVICE — NDL HYPO REG 25G X 1 1/2

## (undated) DEVICE — ELECTRODE EXTENDED BLADE

## (undated) DEVICE — CLIP HORIZON LIG TI MED-LG

## (undated) DEVICE — SOL IRRI STRL WATER 1000ML

## (undated) DEVICE — DISSECTOR SECTO CHERRY 3/8IN

## (undated) DEVICE — FORCEP BX LG CAP 2.8MMX240CM

## (undated) DEVICE — ELECTRODE BLADE INSULATED 1 IN

## (undated) DEVICE — COVER C-ARM STRAP BAND 44X80IN

## (undated) DEVICE — IRRIGATOR SUCTION W/TIP